# Patient Record
Sex: FEMALE | Race: WHITE | NOT HISPANIC OR LATINO | Employment: OTHER | URBAN - METROPOLITAN AREA
[De-identification: names, ages, dates, MRNs, and addresses within clinical notes are randomized per-mention and may not be internally consistent; named-entity substitution may affect disease eponyms.]

---

## 2022-03-30 ENCOUNTER — APPOINTMENT (OUTPATIENT)
Dept: CT IMAGING | Facility: HOSPITAL | Age: 57
End: 2022-03-30

## 2022-03-30 ENCOUNTER — APPOINTMENT (OUTPATIENT)
Dept: GENERAL RADIOLOGY | Facility: HOSPITAL | Age: 57
End: 2022-03-30

## 2022-03-30 ENCOUNTER — HOSPITAL ENCOUNTER (OUTPATIENT)
Facility: HOSPITAL | Age: 57
Setting detail: OBSERVATION
Discharge: HOME | End: 2022-04-03
Attending: EMERGENCY MEDICINE | Admitting: INTERNAL MEDICINE

## 2022-03-30 ENCOUNTER — APPOINTMENT (OUTPATIENT)
Dept: MRI IMAGING | Facility: HOSPITAL | Age: 57
End: 2022-03-30

## 2022-03-30 DIAGNOSIS — R20.2 PARESTHESIA: ICD-10-CM

## 2022-03-30 DIAGNOSIS — R53.1 GENERALIZED WEAKNESS: ICD-10-CM

## 2022-03-30 DIAGNOSIS — I63.9 CEREBROVASCULAR ACCIDENT (CVA), UNSPECIFIED MECHANISM: Primary | ICD-10-CM

## 2022-03-30 DIAGNOSIS — G08 ACUTE CEREBRAL VENOUS SINUS THROMBOSIS: ICD-10-CM

## 2022-03-30 PROBLEM — I10 HTN (HYPERTENSION): Status: ACTIVE | Noted: 2022-03-30

## 2022-03-30 PROBLEM — R55 SYNCOPE AND COLLAPSE: Status: ACTIVE | Noted: 2022-03-30

## 2022-03-30 PROBLEM — D72.829 LEUKOCYTOSIS: Status: ACTIVE | Noted: 2022-03-30

## 2022-03-30 PROBLEM — Z86.16 PERSONAL HISTORY OF COVID-19: Status: ACTIVE | Noted: 2022-03-30

## 2022-03-30 PROBLEM — F41.8 ANXIETY ASSOCIATED WITH DEPRESSION: Status: ACTIVE | Noted: 2022-03-30

## 2022-03-30 PROBLEM — R09.89 SUSPECTED CEREBROVASCULAR ACCIDENT (CVA): Status: ACTIVE | Noted: 2022-03-30

## 2022-03-30 LAB
ALBUMIN SERPL-MCNC: 4.3 G/DL (ref 3.5–5.2)
ALBUMIN/GLOB SERPL: 1.4 G/DL
ALP SERPL-CCNC: 99 U/L (ref 39–117)
ALT SERPL W P-5'-P-CCNC: 16 U/L (ref 1–33)
AMPHET+METHAMPHET UR QL: NEGATIVE
AMPHETAMINES UR QL: NEGATIVE
ANION GAP SERPL CALCULATED.3IONS-SCNC: 17 MMOL/L (ref 5–15)
AST SERPL-CCNC: 18 U/L (ref 1–32)
BARBITURATES UR QL SCN: NEGATIVE
BASOPHILS # BLD AUTO: 0.04 10*3/MM3 (ref 0–0.2)
BASOPHILS NFR BLD AUTO: 0.3 % (ref 0–1.5)
BENZODIAZ UR QL SCN: NEGATIVE
BILIRUB SERPL-MCNC: 0.2 MG/DL (ref 0–1.2)
BILIRUB UR QL STRIP: NEGATIVE
BUN BLDA-MCNC: 15 MG/DL (ref 8–26)
BUN SERPL-MCNC: 14 MG/DL (ref 6–20)
BUN/CREAT SERPL: 14.9 (ref 7–25)
BUPRENORPHINE SERPL-MCNC: NEGATIVE NG/ML
CA-I BLDA-SCNC: 1.29 MMOL/L (ref 1.2–1.32)
CALCIUM SPEC-SCNC: 9.7 MG/DL (ref 8.6–10.5)
CANNABINOIDS SERPL QL: NEGATIVE
CHLORIDE BLDA-SCNC: 103 MMOL/L (ref 98–109)
CHLORIDE SERPL-SCNC: 103 MMOL/L (ref 98–107)
CLARITY UR: CLEAR
CO2 BLDA-SCNC: 26 MMOL/L (ref 24–29)
CO2 SERPL-SCNC: 22 MMOL/L (ref 22–29)
COCAINE UR QL: NEGATIVE
COLOR UR: YELLOW
CREAT BLDA-MCNC: 1 MG/DL (ref 0.6–1.3)
CREAT SERPL-MCNC: 0.94 MG/DL (ref 0.57–1)
D-LACTATE SERPL-SCNC: 1.1 MMOL/L (ref 0.5–2)
D-LACTATE SERPL-SCNC: 3.4 MMOL/L (ref 0.5–2)
DEPRECATED RDW RBC AUTO: 42.1 FL (ref 37–54)
EGFRCR SERPLBLD CKD-EPI 2021: 65.8 ML/MIN/1.73
EGFRCR SERPLBLD CKD-EPI 2021: 70.9 ML/MIN/1.73
EOSINOPHIL # BLD AUTO: 0.19 10*3/MM3 (ref 0–0.4)
EOSINOPHIL NFR BLD AUTO: 1.7 % (ref 0.3–6.2)
ERYTHROCYTE [DISTWIDTH] IN BLOOD BY AUTOMATED COUNT: 13.5 % (ref 12.3–15.4)
FLUAV RNA RESP QL NAA+PROBE: NOT DETECTED
FLUBV RNA RESP QL NAA+PROBE: NOT DETECTED
GLOBULIN UR ELPH-MCNC: 3 GM/DL
GLUCOSE BLDC GLUCOMTR-MCNC: 123 MG/DL (ref 70–130)
GLUCOSE BLDC GLUCOMTR-MCNC: 129 MG/DL (ref 70–130)
GLUCOSE BLDC GLUCOMTR-MCNC: 161 MG/DL (ref 70–130)
GLUCOSE SERPL-MCNC: 163 MG/DL (ref 65–99)
GLUCOSE UR STRIP-MCNC: NEGATIVE MG/DL
HCT VFR BLD AUTO: 40.5 % (ref 34–46.6)
HCT VFR BLDA CALC: 42 % (ref 38–51)
HGB BLD-MCNC: 13 G/DL (ref 12–15.9)
HGB BLDA-MCNC: 14.3 G/DL (ref 12–17)
HGB UR QL STRIP.AUTO: NEGATIVE
HOLD SPECIMEN: NORMAL
IMM GRANULOCYTES # BLD AUTO: 0.09 10*3/MM3 (ref 0–0.05)
IMM GRANULOCYTES NFR BLD AUTO: 0.8 % (ref 0–0.5)
KETONES UR QL STRIP: NEGATIVE
LEUKOCYTE ESTERASE UR QL STRIP.AUTO: NEGATIVE
LYMPHOCYTES # BLD AUTO: 1.67 10*3/MM3 (ref 0.7–3.1)
LYMPHOCYTES NFR BLD AUTO: 14.6 % (ref 19.6–45.3)
MCH RBC QN AUTO: 27.4 PG (ref 26.6–33)
MCHC RBC AUTO-ENTMCNC: 32.1 G/DL (ref 31.5–35.7)
MCV RBC AUTO: 85.4 FL (ref 79–97)
METHADONE UR QL SCN: NEGATIVE
MONOCYTES # BLD AUTO: 1.01 10*3/MM3 (ref 0.1–0.9)
MONOCYTES NFR BLD AUTO: 8.8 % (ref 5–12)
NEUTROPHILS NFR BLD AUTO: 73.8 % (ref 42.7–76)
NEUTROPHILS NFR BLD AUTO: 8.44 10*3/MM3 (ref 1.7–7)
NITRITE UR QL STRIP: NEGATIVE
NRBC BLD AUTO-RTO: 0 /100 WBC (ref 0–0.2)
OPIATES UR QL: NEGATIVE
OXYCODONE UR QL SCN: NEGATIVE
PCP UR QL SCN: NEGATIVE
PH UR STRIP.AUTO: 7.5 [PH] (ref 5–8)
PLATELET # BLD AUTO: 372 10*3/MM3 (ref 140–450)
PMV BLD AUTO: 9.3 FL (ref 6–12)
POTASSIUM BLDA-SCNC: 3.6 MMOL/L (ref 3.5–4.9)
POTASSIUM SERPL-SCNC: 3.9 MMOL/L (ref 3.5–5.2)
PROCALCITONIN SERPL-MCNC: <0.2 NG/ML (ref 0–0.25)
PROPOXYPH UR QL: NEGATIVE
PROT SERPL-MCNC: 7.3 G/DL (ref 6–8.5)
PROT UR QL STRIP: NEGATIVE
QT INTERVAL: 342 MS
QTC INTERVAL: 445 MS
RBC # BLD AUTO: 4.74 10*6/MM3 (ref 3.77–5.28)
SARS-COV-2 RNA RESP QL NAA+PROBE: NOT DETECTED
SODIUM BLD-SCNC: 141 MMOL/L (ref 138–146)
SODIUM SERPL-SCNC: 142 MMOL/L (ref 136–145)
SP GR UR STRIP: 1.06 (ref 1–1.03)
T4 FREE SERPL-MCNC: 1.22 NG/DL (ref 0.93–1.7)
TRICYCLICS UR QL SCN: NEGATIVE
TROPONIN T SERPL-MCNC: <0.01 NG/ML (ref 0–0.03)
UROBILINOGEN UR QL STRIP: ABNORMAL
WBC NRBC COR # BLD: 11.44 10*3/MM3 (ref 3.4–10.8)
WHOLE BLOOD HOLD SPECIMEN: NORMAL
WHOLE BLOOD HOLD SPECIMEN: NORMAL

## 2022-03-30 PROCEDURE — 80306 DRUG TEST PRSMV INSTRMNT: CPT

## 2022-03-30 PROCEDURE — 85025 COMPLETE CBC W/AUTO DIFF WBC: CPT | Performed by: EMERGENCY MEDICINE

## 2022-03-30 PROCEDURE — 82962 GLUCOSE BLOOD TEST: CPT

## 2022-03-30 PROCEDURE — 85014 HEMATOCRIT: CPT

## 2022-03-30 PROCEDURE — 84439 ASSAY OF FREE THYROXINE: CPT

## 2022-03-30 PROCEDURE — 99223 1ST HOSP IP/OBS HIGH 75: CPT | Performed by: INTERNAL MEDICINE

## 2022-03-30 PROCEDURE — 87636 SARSCOV2 & INF A&B AMP PRB: CPT | Performed by: INTERNAL MEDICINE

## 2022-03-30 PROCEDURE — 70498 CT ANGIOGRAPHY NECK: CPT

## 2022-03-30 PROCEDURE — 70496 CT ANGIOGRAPHY HEAD: CPT

## 2022-03-30 PROCEDURE — 93005 ELECTROCARDIOGRAM TRACING: CPT | Performed by: EMERGENCY MEDICINE

## 2022-03-30 PROCEDURE — 99285 EMERGENCY DEPT VISIT HI MDM: CPT

## 2022-03-30 PROCEDURE — 71045 X-RAY EXAM CHEST 1 VIEW: CPT

## 2022-03-30 PROCEDURE — 81003 URINALYSIS AUTO W/O SCOPE: CPT

## 2022-03-30 PROCEDURE — C9803 HOPD COVID-19 SPEC COLLECT: HCPCS

## 2022-03-30 PROCEDURE — 99214 OFFICE O/P EST MOD 30 MIN: CPT

## 2022-03-30 PROCEDURE — 0 IOPAMIDOL PER 1 ML: Performed by: EMERGENCY MEDICINE

## 2022-03-30 PROCEDURE — 0042T HC CT CEREBRAL PERFUSION W/WO CONTRAST: CPT

## 2022-03-30 PROCEDURE — 70450 CT HEAD/BRAIN W/O DYE: CPT

## 2022-03-30 PROCEDURE — G0378 HOSPITAL OBSERVATION PER HR: HCPCS

## 2022-03-30 PROCEDURE — 84481 FREE ASSAY (FT-3): CPT

## 2022-03-30 PROCEDURE — 70551 MRI BRAIN STEM W/O DYE: CPT

## 2022-03-30 PROCEDURE — 84145 PROCALCITONIN (PCT): CPT | Performed by: NURSE PRACTITIONER

## 2022-03-30 PROCEDURE — 83605 ASSAY OF LACTIC ACID: CPT | Performed by: NURSE PRACTITIONER

## 2022-03-30 PROCEDURE — 80047 BASIC METABLC PNL IONIZED CA: CPT

## 2022-03-30 PROCEDURE — 80053 COMPREHEN METABOLIC PANEL: CPT | Performed by: EMERGENCY MEDICINE

## 2022-03-30 PROCEDURE — 84484 ASSAY OF TROPONIN QUANT: CPT | Performed by: NURSE PRACTITIONER

## 2022-03-30 RX ORDER — ASPIRIN 300 MG/1
300 SUPPOSITORY RECTAL DAILY
Status: DISCONTINUED | OUTPATIENT
Start: 2022-03-31 | End: 2022-03-31

## 2022-03-30 RX ORDER — DULOXETIN HYDROCHLORIDE 30 MG/1
30 CAPSULE, DELAYED RELEASE ORAL EVERY MORNING
Status: DISCONTINUED | OUTPATIENT
Start: 2022-03-31 | End: 2022-04-02

## 2022-03-30 RX ORDER — ASPIRIN 81 MG/1
81 TABLET, CHEWABLE ORAL DAILY
Status: DISCONTINUED | OUTPATIENT
Start: 2022-03-31 | End: 2022-03-31

## 2022-03-30 RX ORDER — ACETAMINOPHEN 325 MG/1
650 TABLET ORAL EVERY 4 HOURS PRN
Status: DISCONTINUED | OUTPATIENT
Start: 2022-03-30 | End: 2022-04-03 | Stop reason: HOSPADM

## 2022-03-30 RX ORDER — CHOLECALCIFEROL (VITAMIN D3) 125 MCG
5 CAPSULE ORAL NIGHTLY PRN
Status: DISCONTINUED | OUTPATIENT
Start: 2022-03-30 | End: 2022-04-03 | Stop reason: HOSPADM

## 2022-03-30 RX ORDER — SODIUM CHLORIDE 9 MG/ML
75 INJECTION, SOLUTION INTRAVENOUS CONTINUOUS
Status: ACTIVE | OUTPATIENT
Start: 2022-03-30 | End: 2022-03-31

## 2022-03-30 RX ORDER — SODIUM CHLORIDE 0.9 % (FLUSH) 0.9 %
10 SYRINGE (ML) INJECTION EVERY 12 HOURS SCHEDULED
Status: DISCONTINUED | OUTPATIENT
Start: 2022-03-30 | End: 2022-03-31 | Stop reason: SDUPTHER

## 2022-03-30 RX ORDER — SODIUM CHLORIDE 0.9 % (FLUSH) 0.9 %
10 SYRINGE (ML) INJECTION AS NEEDED
Status: DISCONTINUED | OUTPATIENT
Start: 2022-03-30 | End: 2022-04-03 | Stop reason: HOSPADM

## 2022-03-30 RX ORDER — ONDANSETRON 4 MG/1
4 TABLET, FILM COATED ORAL EVERY 6 HOURS PRN
Status: DISCONTINUED | OUTPATIENT
Start: 2022-03-30 | End: 2022-04-03 | Stop reason: HOSPADM

## 2022-03-30 RX ORDER — SODIUM CHLORIDE 0.9 % (FLUSH) 0.9 %
10 SYRINGE (ML) INJECTION AS NEEDED
Status: DISCONTINUED | OUTPATIENT
Start: 2022-03-30 | End: 2022-03-31 | Stop reason: SDUPTHER

## 2022-03-30 RX ORDER — SODIUM CHLORIDE 0.9 % (FLUSH) 0.9 %
10 SYRINGE (ML) INJECTION EVERY 12 HOURS SCHEDULED
Status: DISCONTINUED | OUTPATIENT
Start: 2022-03-30 | End: 2022-04-03 | Stop reason: HOSPADM

## 2022-03-30 RX ORDER — ONDANSETRON 2 MG/ML
4 INJECTION INTRAMUSCULAR; INTRAVENOUS EVERY 6 HOURS PRN
Status: DISCONTINUED | OUTPATIENT
Start: 2022-03-30 | End: 2022-04-03 | Stop reason: HOSPADM

## 2022-03-30 RX ORDER — ATORVASTATIN CALCIUM 40 MG/1
80 TABLET, FILM COATED ORAL NIGHTLY
Status: DISCONTINUED | OUTPATIENT
Start: 2022-03-30 | End: 2022-03-31

## 2022-03-30 RX ADMIN — IOPAMIDOL 125 ML: 755 INJECTION, SOLUTION INTRAVENOUS at 17:20

## 2022-03-30 RX ADMIN — SODIUM CHLORIDE 500 ML: 9 INJECTION, SOLUTION INTRAVENOUS at 21:24

## 2022-03-30 RX ADMIN — ACETAMINOPHEN 650 MG: 325 TABLET ORAL at 21:23

## 2022-03-30 RX ADMIN — Medication 10 ML: at 21:24

## 2022-03-30 RX ADMIN — ATORVASTATIN CALCIUM 80 MG: 40 TABLET, FILM COATED ORAL at 21:24

## 2022-03-31 ENCOUNTER — APPOINTMENT (OUTPATIENT)
Dept: NEUROLOGY | Facility: HOSPITAL | Age: 57
End: 2022-03-31

## 2022-03-31 ENCOUNTER — APPOINTMENT (OUTPATIENT)
Dept: MRI IMAGING | Facility: HOSPITAL | Age: 57
End: 2022-03-31

## 2022-03-31 ENCOUNTER — APPOINTMENT (OUTPATIENT)
Dept: CARDIOLOGY | Facility: HOSPITAL | Age: 57
End: 2022-03-31

## 2022-03-31 PROBLEM — R29.898 LEFT ARM WEAKNESS: Status: ACTIVE | Noted: 2022-03-31

## 2022-03-31 LAB
ANION GAP SERPL CALCULATED.3IONS-SCNC: 11 MMOL/L (ref 5–15)
APTT PPP: 29.2 SECONDS (ref 50–95)
BASOPHILS # BLD AUTO: 0.02 10*3/MM3 (ref 0–0.2)
BASOPHILS # BLD AUTO: 0.03 10*3/MM3 (ref 0–0.2)
BASOPHILS NFR BLD AUTO: 0.3 % (ref 0–1.5)
BASOPHILS NFR BLD AUTO: 0.4 % (ref 0–1.5)
BH CV ECHO MEAS - AO MAX PG (FULL): 4.6 MMHG
BH CV ECHO MEAS - AO MAX PG: 12 MMHG
BH CV ECHO MEAS - AO MEAN PG (FULL): 2.4 MMHG
BH CV ECHO MEAS - AO MEAN PG: 6 MMHG
BH CV ECHO MEAS - AO ROOT AREA (BSA CORRECTED): 1.6
BH CV ECHO MEAS - AO ROOT AREA: 7.4 CM^2
BH CV ECHO MEAS - AO ROOT DIAM: 3.1 CM
BH CV ECHO MEAS - AO V2 MAX: 175 CM/SEC
BH CV ECHO MEAS - AO V2 MEAN: 105.1 CM/SEC
BH CV ECHO MEAS - AO V2 VTI: 35.5 CM
BH CV ECHO MEAS - AVA(I,A): 2.9 CM^2
BH CV ECHO MEAS - AVA(I,D): 2.9 CM^2
BH CV ECHO MEAS - AVA(V,A): 2.9 CM^2
BH CV ECHO MEAS - AVA(V,D): 2.9 CM^2
BH CV ECHO MEAS - BSA(HAYCOCK): 2.1 M^2
BH CV ECHO MEAS - BSA: 2 M^2
BH CV ECHO MEAS - BZI_BMI: 36.3 KILOGRAMS/M^2
BH CV ECHO MEAS - BZI_METRIC_HEIGHT: 160 CM
BH CV ECHO MEAS - BZI_METRIC_WEIGHT: 93 KG
BH CV ECHO MEAS - EDV(CUBED): 63.8 ML
BH CV ECHO MEAS - EDV(MOD-SP2): 48.7 ML
BH CV ECHO MEAS - EDV(MOD-SP4): 59.6 ML
BH CV ECHO MEAS - EDV(TEICH): 69.8 ML
BH CV ECHO MEAS - EF(CUBED): 69 %
BH CV ECHO MEAS - EF(MOD-BP): 62 %
BH CV ECHO MEAS - EF(MOD-SP2): 66.1 %
BH CV ECHO MEAS - EF(MOD-SP4): 57.7 %
BH CV ECHO MEAS - EF(TEICH): 61.1 %
BH CV ECHO MEAS - ESV(CUBED): 19.8 ML
BH CV ECHO MEAS - ESV(MOD-SP2): 16.5 ML
BH CV ECHO MEAS - ESV(MOD-SP4): 25.2 ML
BH CV ECHO MEAS - ESV(TEICH): 27.1 ML
BH CV ECHO MEAS - FS: 32.3 %
BH CV ECHO MEAS - IVS/LVPW: 1
BH CV ECHO MEAS - IVSD: 1.1 CM
BH CV ECHO MEAS - LA DIMENSION: 2.3 CM
BH CV ECHO MEAS - LA/AO: 0.74
BH CV ECHO MEAS - LAD MAJOR: 4.6 CM
BH CV ECHO MEAS - LAT PEAK E' VEL: 12.4 CM/SEC
BH CV ECHO MEAS - LATERAL E/E' RATIO: 5.7
BH CV ECHO MEAS - LV DIASTOLIC VOL/BSA (35-75): 30.5 ML/M^2
BH CV ECHO MEAS - LV MASS(C)D: 140.2 GRAMS
BH CV ECHO MEAS - LV MASS(C)DI: 71.7 GRAMS/M^2
BH CV ECHO MEAS - LV MAX PG: 5.4 MMHG
BH CV ECHO MEAS - LV MEAN PG: 2.8 MMHG
BH CV ECHO MEAS - LV SYSTOLIC VOL/BSA (12-30): 12.9 ML/M^2
BH CV ECHO MEAS - LV V1 MAX: 115.7 CM/SEC
BH CV ECHO MEAS - LV V1 MEAN: 77.2 CM/SEC
BH CV ECHO MEAS - LV V1 VTI: 26.3 CM
BH CV ECHO MEAS - LVIDD: 4 CM
BH CV ECHO MEAS - LVIDS: 2.7 CM
BH CV ECHO MEAS - LVLD AP2: 7.5 CM
BH CV ECHO MEAS - LVLD AP4: 7.2 CM
BH CV ECHO MEAS - LVLS AP2: 6.1 CM
BH CV ECHO MEAS - LVLS AP4: 5.7 CM
BH CV ECHO MEAS - LVOT AREA (M): 3.8 CM^2
BH CV ECHO MEAS - LVOT AREA: 3.9 CM^2
BH CV ECHO MEAS - LVOT DIAM: 2.2 CM
BH CV ECHO MEAS - LVPWD: 1.1 CM
BH CV ECHO MEAS - MED PEAK E' VEL: 8.1 CM/SEC
BH CV ECHO MEAS - MEDIAL E/E' RATIO: 8.7
BH CV ECHO MEAS - MV A MAX VEL: 80.1 CM/SEC
BH CV ECHO MEAS - MV DEC SLOPE: 335.7 CM/SEC^2
BH CV ECHO MEAS - MV DEC TIME: 0.27 SEC
BH CV ECHO MEAS - MV E MAX VEL: 70.3 CM/SEC
BH CV ECHO MEAS - MV E/A: 0.88
BH CV ECHO MEAS - MV P1/2T MAX VEL: 100.8 CM/SEC
BH CV ECHO MEAS - MV P1/2T: 87.9 MSEC
BH CV ECHO MEAS - MVA P1/2T LCG: 2.2 CM^2
BH CV ECHO MEAS - MVA(P1/2T): 2.5 CM^2
BH CV ECHO MEAS - PA ACC TIME: 0.16 SEC
BH CV ECHO MEAS - PA MAX PG: 3.5 MMHG
BH CV ECHO MEAS - PA PR(ACCEL): 7.7 MMHG
BH CV ECHO MEAS - PA V2 MAX: 93.8 CM/SEC
BH CV ECHO MEAS - RAP SYSTOLE: 3 MMHG
BH CV ECHO MEAS - RVSP: 26 MMHG
BH CV ECHO MEAS - SI(AO): 134.5 ML/M^2
BH CV ECHO MEAS - SI(CUBED): 22.5 ML/M^2
BH CV ECHO MEAS - SI(LVOT): 53 ML/M^2
BH CV ECHO MEAS - SI(MOD-SP2): 16.5 ML/M^2
BH CV ECHO MEAS - SI(MOD-SP4): 17.6 ML/M^2
BH CV ECHO MEAS - SI(TEICH): 21.8 ML/M^2
BH CV ECHO MEAS - SV(AO): 262.8 ML
BH CV ECHO MEAS - SV(CUBED): 44 ML
BH CV ECHO MEAS - SV(LVOT): 103.6 ML
BH CV ECHO MEAS - SV(MOD-SP2): 32.2 ML
BH CV ECHO MEAS - SV(MOD-SP4): 34.4 ML
BH CV ECHO MEAS - SV(TEICH): 42.7 ML
BH CV ECHO MEAS - TAPSE (>1.6): 2 CM
BH CV ECHO MEAS - TR MAX PG: 23 MMHG
BH CV ECHO MEAS - TR MAX VEL: 237.6 CM/SEC
BH CV ECHO MEASUREMENTS AVERAGE E/E' RATIO: 6.86
BH CV XLRA - RV BASE: 3 CM
BH CV XLRA - RV LENGTH: 6.8 CM
BH CV XLRA - RV MID: 2.3 CM
BH CV XLRA - TDI S': 13.9 CM/SEC
BUN SERPL-MCNC: 7 MG/DL (ref 6–20)
BUN/CREAT SERPL: 10.9 (ref 7–25)
CALCIUM SPEC-SCNC: 9.1 MG/DL (ref 8.6–10.5)
CHLORIDE SERPL-SCNC: 101 MMOL/L (ref 98–107)
CHOLEST SERPL-MCNC: 151 MG/DL (ref 0–200)
CO2 SERPL-SCNC: 24 MMOL/L (ref 22–29)
CREAT SERPL-MCNC: 0.64 MG/DL (ref 0.57–1)
DEPRECATED RDW RBC AUTO: 43.4 FL (ref 37–54)
DEPRECATED RDW RBC AUTO: 43.9 FL (ref 37–54)
EGFRCR SERPLBLD CKD-EPI 2021: 103.2 ML/MIN/1.73
EOSINOPHIL # BLD AUTO: 0.19 10*3/MM3 (ref 0–0.4)
EOSINOPHIL # BLD AUTO: 0.19 10*3/MM3 (ref 0–0.4)
EOSINOPHIL NFR BLD AUTO: 2.3 % (ref 0.3–6.2)
EOSINOPHIL NFR BLD AUTO: 2.4 % (ref 0.3–6.2)
ERYTHROCYTE [DISTWIDTH] IN BLOOD BY AUTOMATED COUNT: 13.8 % (ref 12.3–15.4)
ERYTHROCYTE [DISTWIDTH] IN BLOOD BY AUTOMATED COUNT: 13.8 % (ref 12.3–15.4)
GLUCOSE BLDC GLUCOMTR-MCNC: 112 MG/DL (ref 70–130)
GLUCOSE SERPL-MCNC: 149 MG/DL (ref 65–99)
HBA1C MFR BLD: 5.9 % (ref 4.8–5.6)
HCT VFR BLD AUTO: 36.3 % (ref 34–46.6)
HCT VFR BLD AUTO: 37.1 % (ref 34–46.6)
HDLC SERPL-MCNC: 32 MG/DL (ref 40–60)
HGB BLD-MCNC: 11.7 G/DL (ref 12–15.9)
HGB BLD-MCNC: 11.7 G/DL (ref 12–15.9)
IMM GRANULOCYTES # BLD AUTO: 0.03 10*3/MM3 (ref 0–0.05)
IMM GRANULOCYTES # BLD AUTO: 0.04 10*3/MM3 (ref 0–0.05)
IMM GRANULOCYTES NFR BLD AUTO: 0.4 % (ref 0–0.5)
IMM GRANULOCYTES NFR BLD AUTO: 0.5 % (ref 0–0.5)
INR PPP: 1.04 (ref 0.85–1.16)
LDLC SERPL CALC-MCNC: 99 MG/DL (ref 0–100)
LDLC/HDLC SERPL: 3.03 {RATIO}
LEFT ATRIUM VOLUME INDEX: 18.2 ML/M^2
LEFT ATRIUM VOLUME: 35.5 ML
LYMPHOCYTES # BLD AUTO: 1.87 10*3/MM3 (ref 0.7–3.1)
LYMPHOCYTES # BLD AUTO: 1.9 10*3/MM3 (ref 0.7–3.1)
LYMPHOCYTES NFR BLD AUTO: 23.1 % (ref 19.6–45.3)
LYMPHOCYTES NFR BLD AUTO: 23.9 % (ref 19.6–45.3)
MAGNESIUM SERPL-MCNC: 2.4 MG/DL (ref 1.6–2.6)
MAXIMAL PREDICTED HEART RATE: 163 BPM
MCH RBC QN AUTO: 27.6 PG (ref 26.6–33)
MCH RBC QN AUTO: 27.6 PG (ref 26.6–33)
MCHC RBC AUTO-ENTMCNC: 31.5 G/DL (ref 31.5–35.7)
MCHC RBC AUTO-ENTMCNC: 32.2 G/DL (ref 31.5–35.7)
MCV RBC AUTO: 85.6 FL (ref 79–97)
MCV RBC AUTO: 87.5 FL (ref 79–97)
MONOCYTES # BLD AUTO: 0.69 10*3/MM3 (ref 0.1–0.9)
MONOCYTES # BLD AUTO: 0.72 10*3/MM3 (ref 0.1–0.9)
MONOCYTES NFR BLD AUTO: 8.5 % (ref 5–12)
MONOCYTES NFR BLD AUTO: 9.1 % (ref 5–12)
NEUTROPHILS NFR BLD AUTO: 5.09 10*3/MM3 (ref 1.7–7)
NEUTROPHILS NFR BLD AUTO: 5.27 10*3/MM3 (ref 1.7–7)
NEUTROPHILS NFR BLD AUTO: 63.9 % (ref 42.7–76)
NEUTROPHILS NFR BLD AUTO: 65.2 % (ref 42.7–76)
NRBC BLD AUTO-RTO: 0 /100 WBC (ref 0–0.2)
NRBC BLD AUTO-RTO: 0 /100 WBC (ref 0–0.2)
PLATELET # BLD AUTO: 316 10*3/MM3 (ref 140–450)
PLATELET # BLD AUTO: 348 10*3/MM3 (ref 140–450)
PMV BLD AUTO: 9.2 FL (ref 6–12)
PMV BLD AUTO: 9.7 FL (ref 6–12)
POTASSIUM SERPL-SCNC: 3.2 MMOL/L (ref 3.5–5.2)
POTASSIUM SERPL-SCNC: 4.1 MMOL/L (ref 3.5–5.2)
PROTHROMBIN TIME: 13.3 SECONDS (ref 11.4–14.4)
RBC # BLD AUTO: 4.24 10*6/MM3 (ref 3.77–5.28)
RBC # BLD AUTO: 4.24 10*6/MM3 (ref 3.77–5.28)
SODIUM SERPL-SCNC: 136 MMOL/L (ref 136–145)
STRESS TARGET HR: 139 BPM
T3FREE SERPL-MCNC: 3.46 PG/ML (ref 2–4.4)
TRIGL SERPL-MCNC: 111 MG/DL (ref 0–150)
TSH SERPL DL<=0.05 MIU/L-ACNC: 0.88 UIU/ML (ref 0.27–4.2)
UFH PPP CHRO-ACNC: 0.1 IU/ML (ref 0.3–0.7)
UFH PPP CHRO-ACNC: 0.1 IU/ML (ref 0.3–0.7)
VLDLC SERPL-MCNC: 20 MG/DL (ref 5–40)
WBC NRBC COR # BLD: 7.95 10*3/MM3 (ref 3.4–10.8)
WBC NRBC COR # BLD: 8.09 10*3/MM3 (ref 3.4–10.8)

## 2022-03-31 PROCEDURE — 97161 PT EVAL LOW COMPLEX 20 MIN: CPT

## 2022-03-31 PROCEDURE — G0378 HOSPITAL OBSERVATION PER HR: HCPCS

## 2022-03-31 PROCEDURE — 92523 SPEECH SOUND LANG COMPREHEN: CPT

## 2022-03-31 PROCEDURE — 70544 MR ANGIOGRAPHY HEAD W/O DYE: CPT

## 2022-03-31 PROCEDURE — 95816 EEG AWAKE AND DROWSY: CPT

## 2022-03-31 PROCEDURE — 85730 THROMBOPLASTIN TIME PARTIAL: CPT | Performed by: PSYCHIATRY & NEUROLOGY

## 2022-03-31 PROCEDURE — 96366 THER/PROPH/DIAG IV INF ADDON: CPT

## 2022-03-31 PROCEDURE — 93005 ELECTROCARDIOGRAM TRACING: CPT | Performed by: NURSE PRACTITIONER

## 2022-03-31 PROCEDURE — 85610 PROTHROMBIN TIME: CPT | Performed by: PSYCHIATRY & NEUROLOGY

## 2022-03-31 PROCEDURE — 93306 TTE W/DOPPLER COMPLETE: CPT

## 2022-03-31 PROCEDURE — 80061 LIPID PANEL: CPT

## 2022-03-31 PROCEDURE — 80048 BASIC METABOLIC PNL TOTAL CA: CPT | Performed by: NURSE PRACTITIONER

## 2022-03-31 PROCEDURE — 99233 SBSQ HOSP IP/OBS HIGH 50: CPT | Performed by: INTERNAL MEDICINE

## 2022-03-31 PROCEDURE — 84132 ASSAY OF SERUM POTASSIUM: CPT | Performed by: INTERNAL MEDICINE

## 2022-03-31 PROCEDURE — 96365 THER/PROPH/DIAG IV INF INIT: CPT

## 2022-03-31 PROCEDURE — 93306 TTE W/DOPPLER COMPLETE: CPT | Performed by: INTERNAL MEDICINE

## 2022-03-31 PROCEDURE — 85520 HEPARIN ASSAY: CPT | Performed by: PSYCHIATRY & NEUROLOGY

## 2022-03-31 PROCEDURE — 85025 COMPLETE CBC W/AUTO DIFF WBC: CPT | Performed by: NURSE PRACTITIONER

## 2022-03-31 PROCEDURE — 97535 SELF CARE MNGMENT TRAINING: CPT

## 2022-03-31 PROCEDURE — 25010000002 HEPARIN (PORCINE) 25000-0.45 UT/250ML-% SOLUTION: Performed by: PSYCHIATRY & NEUROLOGY

## 2022-03-31 PROCEDURE — 84443 ASSAY THYROID STIM HORMONE: CPT

## 2022-03-31 PROCEDURE — 85025 COMPLETE CBC W/AUTO DIFF WBC: CPT | Performed by: PSYCHIATRY & NEUROLOGY

## 2022-03-31 PROCEDURE — 82962 GLUCOSE BLOOD TEST: CPT

## 2022-03-31 PROCEDURE — 99214 OFFICE O/P EST MOD 30 MIN: CPT | Performed by: PSYCHIATRY & NEUROLOGY

## 2022-03-31 PROCEDURE — 85520 HEPARIN ASSAY: CPT

## 2022-03-31 PROCEDURE — 97530 THERAPEUTIC ACTIVITIES: CPT

## 2022-03-31 PROCEDURE — 97165 OT EVAL LOW COMPLEX 30 MIN: CPT

## 2022-03-31 PROCEDURE — 83735 ASSAY OF MAGNESIUM: CPT | Performed by: NURSE PRACTITIONER

## 2022-03-31 PROCEDURE — 83036 HEMOGLOBIN GLYCOSYLATED A1C: CPT

## 2022-03-31 RX ORDER — POTASSIUM CHLORIDE 750 MG/1
40 CAPSULE, EXTENDED RELEASE ORAL AS NEEDED
Status: DISCONTINUED | OUTPATIENT
Start: 2022-03-31 | End: 2022-04-03 | Stop reason: HOSPADM

## 2022-03-31 RX ORDER — HYDROCODONE BITARTRATE AND ACETAMINOPHEN 5; 325 MG/1; MG/1
1 TABLET ORAL EVERY 6 HOURS PRN
Status: DISCONTINUED | OUTPATIENT
Start: 2022-03-31 | End: 2022-04-03 | Stop reason: HOSPADM

## 2022-03-31 RX ORDER — GABAPENTIN 300 MG/1
300 CAPSULE ORAL EVERY 8 HOURS SCHEDULED
Status: DISCONTINUED | OUTPATIENT
Start: 2022-03-31 | End: 2022-04-03 | Stop reason: HOSPADM

## 2022-03-31 RX ORDER — POTASSIUM CHLORIDE 7.45 MG/ML
10 INJECTION INTRAVENOUS
Status: DISCONTINUED | OUTPATIENT
Start: 2022-03-31 | End: 2022-04-03 | Stop reason: HOSPADM

## 2022-03-31 RX ORDER — HEPARIN SODIUM 10000 [USP'U]/100ML
18 INJECTION, SOLUTION INTRAVENOUS
Status: DISCONTINUED | OUTPATIENT
Start: 2022-03-31 | End: 2022-04-02

## 2022-03-31 RX ORDER — POTASSIUM CHLORIDE 1.5 G/1.77G
40 POWDER, FOR SOLUTION ORAL AS NEEDED
Status: DISCONTINUED | OUTPATIENT
Start: 2022-03-31 | End: 2022-04-03 | Stop reason: HOSPADM

## 2022-03-31 RX ADMIN — ASPIRIN 81 MG 81 MG: 81 TABLET ORAL at 09:27

## 2022-03-31 RX ADMIN — POTASSIUM CHLORIDE 40 MEQ: 750 CAPSULE, EXTENDED RELEASE ORAL at 15:10

## 2022-03-31 RX ADMIN — Medication 10 ML: at 09:27

## 2022-03-31 RX ADMIN — HYDROCODONE BITARTRATE AND ACETAMINOPHEN 1 TABLET: 5; 325 TABLET ORAL at 15:09

## 2022-03-31 RX ADMIN — DULOXETINE HYDROCHLORIDE 30 MG: 30 CAPSULE, DELAYED RELEASE ORAL at 09:27

## 2022-03-31 RX ADMIN — POTASSIUM CHLORIDE 40 MEQ: 750 CAPSULE, EXTENDED RELEASE ORAL at 11:39

## 2022-03-31 RX ADMIN — GABAPENTIN 300 MG: 300 CAPSULE ORAL at 21:44

## 2022-03-31 RX ADMIN — ACETAMINOPHEN 650 MG: 325 TABLET ORAL at 01:21

## 2022-03-31 RX ADMIN — HEPARIN SODIUM 10.8 UNITS/KG/HR: 10000 INJECTION, SOLUTION INTRAVENOUS at 11:35

## 2022-03-31 RX ADMIN — SODIUM CHLORIDE 75 ML/HR: 9 INJECTION, SOLUTION INTRAVENOUS at 00:54

## 2022-03-31 RX ADMIN — ACETAMINOPHEN 650 MG: 325 TABLET ORAL at 05:19

## 2022-03-31 RX ADMIN — GABAPENTIN 300 MG: 300 CAPSULE ORAL at 15:09

## 2022-04-01 LAB
ANION GAP SERPL CALCULATED.3IONS-SCNC: 10 MMOL/L (ref 5–15)
BASOPHILS # BLD AUTO: 0.05 10*3/MM3 (ref 0–0.2)
BASOPHILS NFR BLD AUTO: 0.7 % (ref 0–1.5)
BUN SERPL-MCNC: 7 MG/DL (ref 6–20)
BUN/CREAT SERPL: 13 (ref 7–25)
CALCIUM SPEC-SCNC: 8.9 MG/DL (ref 8.6–10.5)
CHLORIDE SERPL-SCNC: 104 MMOL/L (ref 98–107)
CO2 SERPL-SCNC: 25 MMOL/L (ref 22–29)
CREAT SERPL-MCNC: 0.54 MG/DL (ref 0.57–1)
DEPRECATED RDW RBC AUTO: 45.7 FL (ref 37–54)
EGFRCR SERPLBLD CKD-EPI 2021: 107.5 ML/MIN/1.73
EOSINOPHIL # BLD AUTO: 0.27 10*3/MM3 (ref 0–0.4)
EOSINOPHIL NFR BLD AUTO: 3.7 % (ref 0.3–6.2)
ERYTHROCYTE [DISTWIDTH] IN BLOOD BY AUTOMATED COUNT: 13.7 % (ref 12.3–15.4)
GLUCOSE SERPL-MCNC: 128 MG/DL (ref 65–99)
HCT VFR BLD AUTO: 39.2 % (ref 34–46.6)
HGB BLD-MCNC: 11.9 G/DL (ref 12–15.9)
IMM GRANULOCYTES # BLD AUTO: 0.04 10*3/MM3 (ref 0–0.05)
IMM GRANULOCYTES NFR BLD AUTO: 0.5 % (ref 0–0.5)
LYMPHOCYTES # BLD AUTO: 2.34 10*3/MM3 (ref 0.7–3.1)
LYMPHOCYTES NFR BLD AUTO: 31.8 % (ref 19.6–45.3)
MCH RBC QN AUTO: 27.7 PG (ref 26.6–33)
MCHC RBC AUTO-ENTMCNC: 30.4 G/DL (ref 31.5–35.7)
MCV RBC AUTO: 91.4 FL (ref 79–97)
MONOCYTES # BLD AUTO: 0.81 10*3/MM3 (ref 0.1–0.9)
MONOCYTES NFR BLD AUTO: 11 % (ref 5–12)
NEUTROPHILS NFR BLD AUTO: 3.84 10*3/MM3 (ref 1.7–7)
NEUTROPHILS NFR BLD AUTO: 52.3 % (ref 42.7–76)
NRBC BLD AUTO-RTO: 0 /100 WBC (ref 0–0.2)
PLATELET # BLD AUTO: 297 10*3/MM3 (ref 140–450)
PMV BLD AUTO: 9.4 FL (ref 6–12)
POTASSIUM SERPL-SCNC: 4.1 MMOL/L (ref 3.5–5.2)
RBC # BLD AUTO: 4.29 10*6/MM3 (ref 3.77–5.28)
SODIUM SERPL-SCNC: 139 MMOL/L (ref 136–145)
UFH PPP CHRO-ACNC: 0.1 IU/ML (ref 0.3–0.7)
UFH PPP CHRO-ACNC: 0.1 IU/ML (ref 0.3–0.7)
UFH PPP CHRO-ACNC: >1.1 IU/ML (ref 0.3–0.7)
WBC NRBC COR # BLD: 7.35 10*3/MM3 (ref 3.4–10.8)

## 2022-04-01 PROCEDURE — 85520 HEPARIN ASSAY: CPT

## 2022-04-01 PROCEDURE — 85520 HEPARIN ASSAY: CPT | Performed by: INTERNAL MEDICINE

## 2022-04-01 PROCEDURE — 25010000002 HEPARIN (PORCINE) 25000-0.45 UT/250ML-% SOLUTION

## 2022-04-01 PROCEDURE — 80048 BASIC METABOLIC PNL TOTAL CA: CPT | Performed by: INTERNAL MEDICINE

## 2022-04-01 PROCEDURE — 25010000002 HEPARIN (PORCINE) 25000-0.45 UT/250ML-% SOLUTION: Performed by: INTERNAL MEDICINE

## 2022-04-01 PROCEDURE — 99232 SBSQ HOSP IP/OBS MODERATE 35: CPT | Performed by: INTERNAL MEDICINE

## 2022-04-01 PROCEDURE — 96366 THER/PROPH/DIAG IV INF ADDON: CPT

## 2022-04-01 PROCEDURE — G0378 HOSPITAL OBSERVATION PER HR: HCPCS

## 2022-04-01 PROCEDURE — 85025 COMPLETE CBC W/AUTO DIFF WBC: CPT | Performed by: INTERNAL MEDICINE

## 2022-04-01 PROCEDURE — 99213 OFFICE O/P EST LOW 20 MIN: CPT | Performed by: CLINICAL NURSE SPECIALIST

## 2022-04-01 RX ADMIN — HEPARIN SODIUM 19 UNITS/KG/HR: 10000 INJECTION, SOLUTION INTRAVENOUS at 07:05

## 2022-04-01 RX ADMIN — HEPARIN SODIUM 18 UNITS/KG/HR: 10000 INJECTION, SOLUTION INTRAVENOUS at 22:43

## 2022-04-01 RX ADMIN — ACETAMINOPHEN 650 MG: 325 TABLET ORAL at 22:44

## 2022-04-01 RX ADMIN — GABAPENTIN 300 MG: 300 CAPSULE ORAL at 19:05

## 2022-04-01 RX ADMIN — HYDROCODONE BITARTRATE AND ACETAMINOPHEN 1 TABLET: 5; 325 TABLET ORAL at 06:42

## 2022-04-01 RX ADMIN — GABAPENTIN 300 MG: 300 CAPSULE ORAL at 05:44

## 2022-04-01 RX ADMIN — DULOXETINE HYDROCHLORIDE 30 MG: 30 CAPSULE, DELAYED RELEASE ORAL at 06:02

## 2022-04-01 RX ADMIN — Medication 10 ML: at 08:53

## 2022-04-01 RX ADMIN — HYDROCODONE BITARTRATE AND ACETAMINOPHEN 1 TABLET: 5; 325 TABLET ORAL at 00:17

## 2022-04-01 RX ADMIN — Medication 5 MG: at 00:17

## 2022-04-01 NOTE — PROGRESS NOTES
"Neurology       Patient Care Team:  Betty Figueroa APRN as PCP - General (Family Medicine)    Chief complaint headache, right side heaviness       Subjective .     History:    Sitting in bed watching TV.   at bedside.  Patient continues to have fluctuating headache symptoms.  At this time it is dull rated 1 out of 10.  She is taking Lortab as needed, and is on gabapentin 3 times daily.  Continues on heparin drip at this time, not therapeutic yet.  She denies any weakness, she does say she had some dizziness when up to the restroom.  Denies nausea or vomiting, continues with some blurry vision.    ROS: +headache, blurry vision   All other systems negative    Objective     Vital Signs   Blood pressure 144/76, pulse 67, temperature 98 °F (36.7 °C), temperature source Oral, resp. rate 16, height 160 cm (63\"), weight 93 kg (205 lb), SpO2 93 %.    Physical Exam:  Adult woman, awake and alert, oriented to person/place/time. Speech is clear, no dysarthria or aphasia. EOMI, VF intact to threat, no facial weakness, hearing intact, tongue midline. Motor nml tone and bulk, no drift, upper 5/5, lower 5/5. Sensation intact to light touch.    Results Review:               MRI brain personally reviewed no acute stroke  MRV brain personally reviewed showing superior sagittal and right transverse occlusion/consistent w/ cerebral venous thrombosis    2d Echo  · Left ventricular ejection fraction appears to be 61 - 65%. Left ventricular systolic function is normal.  · Left ventricular wall thickness is consistent with borderline concentric hypertrophy  · Saline test results negative     a1c 5.9, ldl 99  ua negative for infection  uds negative  Heparin anti-Xa level this a.m. less than 0.10  Hemoglobin 11.9, hematocrit 39.2, platelets 297      EEG    Result Date: 3/31/2022  Normal study This report is transcribed using the Dragon dictation system.      CT Angiogram Neck    Result Date: 3/30/2022   1. No evidence of " hemodynamically significant carotid or right vertebral artery stenosis in the neck. 2. Hypoplastic appearing left vertebral artery, which is inconsistent in diameter along its length, possibly as a developmental variant. No evidence of left vertebral occlusion. 3. Incidentally noted enlarged adenoids.  This report was finalized on 3/30/2022 6:03 PM by Dr. Taye Mayfield MD.      MRI Brain Without Contrast    Result Date: 3/31/2022   1. No acute diffusion restriction to suggest ischemia 2. Abnormal appearance of the sagittal sinus with heterogeneous areas of T1 signal. Findings may be artifactual in nature however developing venous sinus thrombosis is a consideration. Further evaluation with CT venogram or MR venogram recommended. 3. Right mastoid effusion, possible mastoiditis  Findings were called to the patient's nurse at the time of interpretation..  This report was finalized on 3/31/2022 12:40 AM by Rudi Bartholomew.      XR Chest 1 View    Result Date: 3/30/2022  IMPRESSION : Negative low lung volume portable chest[  This report was finalized on 3/30/2022 6:15 PM by Rudi Bartholomew.      MRI Angiogram Venogram Head    Result Date: 3/31/2022  Abnormal signal within superior sagittal, right transverse, and right sigmoid sinuses, suggesting venous sinus thrombosis.  This report was finalized on 3/31/2022 10:47 AM by Rashaun Escoto MD.      CT Head Without Contrast Stroke Protocol    Result Date: 3/30/2022  No evidence of acute intracranial disease.    Note: Exam time is shown as 4:58 PM. Study was reviewed on the CT scan monitor and discussed with Dr. Cochran at approximately 5:03 PM.  This report was finalized on 3/30/2022 5:13 PM by Dr. Taye Mayfield MD.      CT Angiogram Head w AI Analysis of LVO    Result Date: 3/30/2022   1. Segmental narrowing of the distal 2 cm of the left vertebral artery, perhaps as a developmental variant. No definite underlying focal stenosis is seen. Although vertebral artery dissection  can cause segmental narrowing, No particular features are seen to suggest this. 2. No evidence of hemodynamically significant intracranial vascular stenosis is seen elsewhere.   This report was finalized on 3/30/2022 6:12 PM by Dr. Taye Mayfield MD.      CT CEREBRAL PERFUSION WITH & WITHOUT CONTRAST    Result Date: 3/30/2022   1. Negative rapid analysis for large vessel occlusion or ischemia. 2. Mild asymmetry of blood flow in the right posterior temporal and occipital lobe, of only questionable significance, possibly relatively slow blood flow in this area. No corresponding abnormality to suggest ischemia or infarction.  This report was finalized on 3/30/2022 5:28 PM by Dr. Taye Mayfield MD.          Assessment/Plan     Cerebral venous thrombosis in setting of recent COVID infection complicated by headache and syncope due to increased ICP. No ischemic changes on MRI brain. EEG nml. Echo reassuring.      -heparin gtt, no bolus ever  -once heparin is therapeutic, transition to eliquis 5mg bid for minimum of three months   -gabapentin 300mg tid  -prn lortab  -avoid OTC meds for headache  -Okay to restart patient's home meds including blood pressure and antihistamine if needed  -Patient will need repeat MRV in about 3 months, and follow-up in clinic    Discussed plan with patient and  at bedside, all questions answered.  Stroke neurology will continue to follow patient.         BERE Melvin  04/01/22  09:12 EDT

## 2022-04-01 NOTE — PLAN OF CARE
Goal Outcome Evaluation:  Plan of Care Reviewed With: patient, significant other        Progress: improving  Outcome Evaluation: VSS. NSR. NIHSS=0. No dizziness/syncope exhibited today. Heparin IV infusing as ordered. Pt's headache relieved w/ norco. Plan home w/ family when medically ready.     K+ repleted, repeat lab result pending.

## 2022-04-01 NOTE — PROGRESS NOTES
HEPARIN INFUSION  Naima Paul is a  57 y.o. female receiving heparin infusion.           Therapy for (VTE/Cardiac): Cardiac (venous sinus thrombosis)  Patient Weight: 92.9 kg   Initial Bolus (Y/N):  N  Any Bolus (Y/N):  N       Signs or Symptoms of Bleeding: None per RN ; monitor for infiltration    Cardiac or Other (Not VTE)   Initial Bolus: 60 units/kg (Max 4,000 units)  Initial rate: 12 units/kg/hr (Max 1,000 units/hr)   Anti-Xa (IU/mL) Bolus Dose Stop Infusion Rate Change Repeat Anti-Xa      ?0.19 60 units/kg 0 hrs Increase rate by 4 units/kg/hr 6 hrs       0.2 - 0.29  30 units/kg 0 hrs Increase rate by 2 units/kg/hr 6 hrs    0.3 - 0.7 0 0 hrs No change 6 hrs      0.71 - 0.99 0  0 hrs Decrease rate by 2 units/kg/hr 6 hrs            ?1 0 Hold 1 hr Decrease rate by 3 units/kg/hr 6 hrs      Results from last 7 days   Lab Units 04/01/22  0827 03/31/22  1155 03/31/22  0801 03/30/22  1711   INR   --  1.04  --   --    HEMOGLOBIN g/dL 11.9*  --  11.7*  11.7* 13.0   HEMATOCRIT % 39.2  --  36.3  37.1 40.5   PLATELETS 10*3/mm3 297  --  348  316 372       Date   Time   Anti-Xa Current Rate (Unit/kg/hr) Bolus   (Units) Rate Change   (Unit/kg/hr) New Rate (Unit/kg/hr) Next   Anti-Xa Comments  Pump Check Daily   3/31 1046 0.10 -- -- +10.8 10.8 1800 D/w OLLIE Hernández; will start when able; follow up Anti-Xa initial when back   3/31 1400 -- -- -- -- -- -- PUMP CHECKED    3.31 1816 0.10 10.8 -- +4.2 15 0000 S/w nurse   4/1 0038 0.10 15 -- +4 19 0800 D/W  Brian   4/1 0827 0.10 19 -- +3 22 1800 D/w OLLIE Langford; pt line infiltrated ~0830 this AM (close to when lab was drawn - will increase by 3 only as AXA may be falsely low)                                                                                                                                                                                     Taylor Riedel, PharmD, [facility]  Pharmacy Resident  4/1/2022  11:18 EDT

## 2022-04-01 NOTE — CASE MANAGEMENT/SOCIAL WORK
Continued Stay Note  [facility]     Patient Name: Naima Paul  MRN: [id number]  Today's Date: 4/1/2022    Admit Date: 3/30/2022     Discharge Plan     Row Name 04/01/22 1344       Plan    Plan Home    Plan Comments Spoke with patient in room.  Her plan is still to return home at discharge.  Discussed patient in MDR.  Plan to initiate Eliquis.  Pharmacy consulted for Meds to Beds.  Patient also given copay card.  No other needs noted at this time.  CM will continue to follow.    Final Discharge Disposition Code 01 - home or self-care               Discharge Codes    No documentation.               Expected Discharge Date and Time     Expected Discharge Date Expected Discharge Time    Apr 2, 2022             Juany York RN

## 2022-04-01 NOTE — PROGRESS NOTES
[facility]  PROGRESS NOTE    Patient Name: Naima Paul  : 1965  MRN: [id number]    Date of Admission: 3/30/2022  Primary Care Physician: Betty Figueroa APRN    Subjective   Subjective     CC:  R arm paresthesias, headaches, syncope    HPI: headache is better.  Gets worse at night, but meds seem to be helping.  No further paresthesias.  Feels generally a bit weak, and still has 'gray area' in center of her vision.     ROS:  Gen- No fevers, chills  CV- No chest pain, palpitations  Resp- No cough, dyspnea  GI- No N/V/D, abd pain        Objective   Objective     Vital Signs:   Temp:  [98.1 °F (36.7 °C)-98.2 °F (36.8 °C)] 98.2 °F (36.8 °C)  Heart Rate:  [64-85] 69  Resp:  [16-18] 18  BP: (112-136)/(68-88) 136/81  Flow (L/min):  [2] 2     Physical Exam:  Constitutional: Awake, alert , walks back from BR with minimal assistance.  NAD   Eyes: PER, sclerae anicteric, no conjunctival injection  HENT: NCAT, mucous membranes moist  Neck: Supple, trachea midline  Respiratory: Clear to auscultation bilaterally, nonlabored respirations   Cardiovascular: RRR, no murmurs, rubs  Gastrointestinal: Positive bowel sounds, soft, nontender, nondistended  Musculoskeletal: No bilateral ankle edema, no clubbing or cyanosis to extremities  Psychiatric: Appropriate affect, cooperative  Neurologic: Oriented x 3, strength symmetric in all extremities, Cranial Nerves grossly intact to confrontation, speech clear, FTN intact bilaterally, HTS intact bilaterally, EOMI,   Skin: No rashes      Results Reviewed:  LAB RESULTS:      Lab 22  0038 22  1816 22  1155 22  0801 22  2058 22  1711 22  1706   WBC  --   --   --  7.95  8.09  --  11.44*  --    HEMOGLOBIN  --   --   --  11.7*  11.7*  --  13.0  --    HEMOGLOBIN, POC  --   --   --   --   --   --  14.3   HEMATOCRIT  --   --   --  36.3  37.1  --  40.5  --    HEMATOCRIT POC  --   --   --   --   --   --   42   PLATELETS  --   --   --  348  316  --  372  --    NEUTROS ABS  --   --   --  5.09  5.27  --  8.44*  --    IMMATURE GRANS (ABS)  --   --   --  0.03  0.04  --  0.09*  --    LYMPHS ABS  --   --   --  1.90  1.87  --  1.67  --    MONOS ABS  --   --   --  0.72  0.69  --  1.01*  --    EOS ABS  --   --   --  0.19  0.19  --  0.19  --    MCV  --   --   --  85.6  87.5  --  85.4  --    PROCALCITONIN  --   --   --   --   --  <0.20  --    LACTATE  --   --   --   --  1.1 3.4*  --    PROTIME  --   --  13.3  --   --   --   --    APTT  --   --  29.2*  --   --   --   --    HEPARIN ANTI-XA 0.10* 0.10* 0.10*  --   --   --   --          Lab 03/31/22 2044 03/31/22  0801 03/30/22  1711 03/30/22  1706   SODIUM  --  136 142  --    POTASSIUM 4.1 3.2* 3.9  --    CHLORIDE  --  101 103  --    CO2  --  24.0 22.0  --    ANION GAP  --  11.0 17.0*  --    BUN  --  7 14  --    CREATININE  --  0.64 0.94 1.00   EGFR  --  103.2 70.9 65.8   GLUCOSE  --  149* 163*  --    CALCIUM  --  9.1 9.7  --    MAGNESIUM  --  2.4  --   --    HEMOGLOBIN A1C  --  5.90*  --   --    TSH  --  0.879  --   --          Lab 03/30/22  1711   TOTAL PROTEIN 7.3   ALBUMIN 4.30   GLOBULIN 3.0   ALT (SGPT) 16   AST (SGOT) 18   BILIRUBIN 0.2   ALK PHOS 99         Lab 03/31/22  1155 03/30/22  2058   TROPONIN T  --  <0.010   PROTIME 13.3  --    INR 1.04  --          Lab 03/31/22  0801   CHOLESTEROL 151   LDL CHOL 99   HDL CHOL 32*   TRIGLYCERIDES 111             Brief Urine Lab Results  (Last result in the past 365 days)      Color   Clarity   Blood   Leuk Est   Nitrite   Protein   CREAT   Urine HCG        03/30/22 2055 Yellow   Clear   Negative   Negative   Negative   Negative                 Microbiology Results Abnormal     Procedure Component Value - Date/Time    COVID-19 and FLU A/B PCR - Swab, Nasopharynx [[id number]]  (Normal) Collected: 03/30/22 1941    Lab Status: Final result Specimen: Swab from Nasopharynx Updated: 03/30/22 2022     COVID19 Not Detected      Influenza A PCR Not Detected     Influenza B PCR Not Detected    Narrative:      Fact sheet for providers: https://www.fda.gov/media/020152/download    Fact sheet for patients: https://www.fda.gov/media/162345/download    Test performed by PCR.          Adult Transthoracic Echo Complete W/ Cont if Necessary Per Protocol (With Agitated Saline)    Result Date: 3/31/2022  · Left ventricular ejection fraction appears to be 61 - 65%. Left ventricular systolic function is normal. · Left ventricular wall thickness is consistent with borderline concentric hypertrophy.      EEG    Result Date: 3/31/2022  Reason for referral: 57 y.o.female with syncope, speech changes, consideration of seizure Technical Summary:  A 19 channel digital EEG was performed using the international 10-20 placement system, including eye leads and EKG leads. Duration: 20 minutes Findings: The awake tracing shows a low amplitude well-regulated 10 Hz posterior rhythm present symmetrically over the occipital and posterior parietal leads.  Low to medium amplitude intermixed theta and alpha activity is seen anteriorly along with intermixed EMG and eye blink artifact.  Photic stimulation elicits a symmetric driving response.  Hyperventilation is not performed.  Light drowsiness is seen with mild slowing of the background but stage II sleep is not seen.  No focal features or epileptiform activity are seen. Video: Off Technical quality: Good EKG: Regular, 70 bpm SUMMARY: Normal EEG in the awake and lightly drowsy states No focal features or epileptiform activity are seen     Impression: Normal study This report is transcribed using the Dragon dictation system.      CT Angiogram Neck    Result Date: 3/30/2022  DATE OF EXAM: 3/30/2022 5:01 PM  PROCEDURE: CT ANGIOGRAM NECK-  INDICATIONS: Stroke, follow up  COMPARISON: No comparisons available.  TECHNIQUE: CTA of the head and CTA of the neck was performed after the intravenous administration of 125 mL of Isovue  370. Reconstructed coronal and sagittal images were also obtained. In addition, a 3-D volume rendered image was obtained after post processing. Automated exposure control and iterative reconstruction methods were used. AI analysis of LVO was utilized for the CTA Head imaging portion of the study.  The radiation dose reduction device was turned on for each scan per the ALARA (As Low as Reasonably Achievable) protocol.  Stenosis measurement was performed by the NASCET or similar method.  FINDINGS:  Images of the proximal carotid and vertebral arteries are degraded as a result of body habitus and x-ray beam scatter. No significant common carotid stenosis is suspected.  On the right, common carotid, internal and external carotid arteries appear unremarkable to the level of the skull base.  On the left, common, internal and external carotid arteries appear within normal limits as well.  Right vertebral artery is a relatively robust vessel, normal in appearance along its length to the level of the skull base.  Left vertebral artery is a small vessel of inconsistent diameter, change is diameter along its course, and appearing quite small within the posterior fossa. No particular features are seen to suggest clot or to favor a dissection.  Elsewhere at the level of this scan, no significant soft tissue neck pathology is seen Other than perhaps mildly enlarged adenoids. Included lung apices appear clear.        Impression:  1. No evidence of hemodynamically significant carotid or right vertebral artery stenosis in the neck. 2. Hypoplastic appearing left vertebral artery, which is inconsistent in diameter along its length, possibly as a developmental variant. No evidence of left vertebral occlusion. 3. Incidentally noted enlarged adenoids.  This report was finalized on 3/30/2022 6:03 PM by Dr. Taye Mayfield MD.      MRI Brain Without Contrast    Result Date: 3/31/2022  MRI BRAIN WO CONTRAST-  Date of Exam: 3/30/2022 9:55 PM   Indication: Stroke, follow up; I63.9-Cerebral infarction, unspecified; R20.2-Paresthesia of skin; R53.1-Weakness.  Technique: Routine multiplanar multisequence MR imaging of the brain was performed without the administration of   gadolinium contrast.  Comparison Exams: CTs performed same day  FINDINGS: There are no areas of restricted diffusion.    There is no evidence of intracranial hemorrhage.  There is no mass, mass effect, or hydrocephalus.   No abnormal extra-axial fluid collections are seen.    Increased heterogeneous T1 signal seen within the sagittal sinus without corresponding findings in the straight sinus or additional major sinuses..   Sella and suprasellar cistern are within normal limits.    Cranio-vertebral junction is normal.  Globes and orbits are within normal limits.  Near complete opacification of the right mastoid air cells is noted. Left mastoid air cells and visualized paranasal sinuses are clear      Impression:  1. No acute diffusion restriction to suggest ischemia 2. Abnormal appearance of the sagittal sinus with heterogeneous areas of T1 signal. Findings may be artifactual in nature however developing venous sinus thrombosis is a consideration. Further evaluation with CT venogram or MR venogram recommended. 3. Right mastoid effusion, possible mastoiditis  Findings were called to the patient's nurse at the time of interpretation..  This report was finalized on 3/31/2022 12:40 AM by Rudi Bartholomew.      XR Chest 1 View    Result Date: 3/30/2022   DATE OF EXAM: 3/30/2022 4:58 PM  PROCEDURE: XR CHEST 1 VW-  INDICATIONS: Stroke Protocol (Onset > 12 Hrs)  COMPARISON: No Comparisons Available  TECHNIQUE: Portable Chest  FINDINGS:  Heart size is within normal limits.  Lung volumes are low with vascular crowding and atelectasis. No focal consolidation noted. Osseous structures are unremarkable      Impression: IMPRESSION : Negative low lung volume portable chest[  This report was finalized on  3/30/2022 6:15 PM by Rudi Bartholomew.      MRI Angiogram Venogram Head    Result Date: 3/31/2022  DATE OF EXAM: 3/31/2022 2:00 AM  PROCEDURE: MRI ANGIOGRAM VENOGRAM HEAD-  INDICATIONS: Stroke, follow up; I63.9-Cerebral infarction, unspecified; R20.2-Paresthesia of skin; R53.1-Weakness  COMPARISON: MRI brain and CT angiography head from March 30, 2022  TECHNIQUE: MRI venogram of the head was performed without IV contrast using time-of-flight technique.  FINDINGS: There is abnormal signal within the superior sagittal sinus and the right transverse and sigmoid sinuses suggesting venous sinus thrombosis. The straight sinus and left transverse and sigmoid sinuses appear widely patent.      Impression: Abnormal signal within superior sagittal, right transverse, and right sigmoid sinuses, suggesting venous sinus thrombosis.  This report was finalized on 3/31/2022 10:47 AM by Rashaun Escoto MD.      CT Head Without Contrast Stroke Protocol    Result Date: 3/30/2022  DATE OF EXAM: 3/30/2022 4:54 PM  PROCEDURE: CT HEAD WO CONTRAST STROKE PROTOCOL-  INDICATIONS: Stroke, follow up  COMPARISON: No comparisons available.  TECHNIQUE: Routine transaxial and coronal reconstruction images were obtained through the head without the administration of contrast. Automated exposure control and iterative reconstruction methods were used.  The radiation dose reduction device was turned on for each scan per the ALARA (As Low as Reasonably Achievable) protocol.  FINDINGS: The calvarium appears intact. Included paranasal sinuses mastoids and middle ear spaces appear clear. Soft tissue window images show mild degree of generalized cerebral atrophy appropriate for age. There is no evidence of acute or old infarction, no evidence of intracranial hemorrhage, mass, mass effect, hydrocephalus, or abnormal extra-axial collection. Gray/white matter interface is well maintained.      Impression: No evidence of acute intracranial disease.    Note:  Exam time is shown as 4:58 PM. Study was reviewed on the CT scan monitor and discussed with Dr. Cochran at approximately 5:03 PM.  This report was finalized on 3/30/2022 5:13 PM by Dr. Taye Mayfield MD.      CT Angiogram Head w AI Analysis of LVO    Result Date: 3/30/2022  EXAMINATION: CT ANGIOGRAM HEAD W AI ANALYSIS OF LVO-  INDICATION: Stroke, follow up  TECHNIQUE: Pre and postcontrast 3 mm and 0.75 mm axial images through the brain with sagittal and coronal 2-D reconstructions and 3-D VRT and MIP angiographic reconstructions.  The radiation dose reduction device was turned on for each scan per the ALARA (As Low as Reasonably Achievable) protocol.  AI analysis of LVO was utilized.  COMPARISON: NONE  FINDINGS: Intracranial portions of the internal carotid arteries appear normal in caliber. Anterior and middle cerebral arteries and proximal branches appear grossly normal with no apparent focal stenosis. Right vertebral artery appears significantly larger than left and normal in appearance. Left vertebral artery shows a markedly narrowed distal 2 cm segment compared to the proximal portion of vertebral artery, whether as a result of underlying occult stenosis, or as a developmental variant. Basilar artery and posterior cerebral arteries appear grossly normal, although the posterior cerebral arteries are not well seen out into the periphery. No evidence of vascular malformation is appreciated.       Impression:  1. Segmental narrowing of the distal 2 cm of the left vertebral artery, perhaps as a developmental variant. No definite underlying focal stenosis is seen. Although vertebral artery dissection can cause segmental narrowing, No particular features are seen to suggest this. 2. No evidence of hemodynamically significant intracranial vascular stenosis is seen elsewhere.   This report was finalized on 3/30/2022 6:12 PM by Dr. Taye Mayfield MD.      CT CEREBRAL PERFUSION WITH & WITHOUT CONTRAST    Result Date:  3/30/2022  DATE OF EXAM: 3/30/2022 5:01 PM  PROCEDURE: CT CEREBRAL PERFUSION W WO CONTRAST-  INDICATIONS: Neuro deficit, acute, stroke suspected  COMPARISON: No comparisons available.  TECHNIQUE: Routine transaxial cuts were obtained through the head without administration of contrast. Routine transaxial cuts were then obtained through the head following the intravenous administration of 125 mL of Isovue 300. Core blood volume, core blood flow, mean transit time, and Tmax images were obtained utilizing the Rapid software protocol. A limited CT angiogram of the head was also performed to measure the blood vessel density.  The radiation dose reduction device was turned on for each scan per the ALARA (As Low as Reasonably Achievable) protocol.  FINDINGS: Rapid analysis shows no areas of the brain demonstrating cerebral blood flow less than 30% or T-Max greater than six seconds. Individual perfusion maps, appear to show mild asymmetry of blood flow with decreased time to drain and mean transit time generally in right posterior cerebral artery distribution. Subjectively, there may be slightly decreased cerebral blood flow here as well but this is a more questionable finding and cerebral blood volume appears normal. No focal asymmetry or perfusion is seen elsewhere to suggest focal ischemia.      Impression:  1. Negative rapid analysis for large vessel occlusion or ischemia. 2. Mild asymmetry of blood flow in the right posterior temporal and occipital lobe, of only questionable significance, possibly relatively slow blood flow in this area. No corresponding abnormality to suggest ischemia or infarction.  This report was finalized on 3/30/2022 5:28 PM by Dr. Taye Mayfield MD.        Results for orders placed during the hospital encounter of 03/30/22    Adult Transthoracic Echo Complete W/ Cont if Necessary Per Protocol (With Agitated Saline)    Interpretation Summary  · Left ventricular ejection fraction appears to be 61 -  65%. Left ventricular systolic function is normal.  · Left ventricular wall thickness is consistent with borderline concentric hypertrophy.      I have reviewed the medications:  Scheduled Meds:DULoxetine, 30 mg, Oral, QAM  gabapentin, 300 mg, Oral, Q8H  sodium chloride, 10 mL, Intravenous, Q12H      Continuous Infusions:heparin, 19 Units/kg/hr, Last Rate: 19 Units/kg/hr (04/01/22 0705)  Pharmacy to Dose Heparin,       PRN Meds:.•  acetaminophen  •  HYDROcodone-acetaminophen  •  melatonin  •  ondansetron **OR** ondansetron  •  Pharmacy to Dose Heparin  •  potassium chloride **OR** potassium chloride **OR** potassium chloride  •  sodium chloride    Assessment/Plan   Assessment & Plan     Active Hospital Problems    Diagnosis  POA   • Left arm weakness [R29.898]  Yes   • Suspected cerebrovascular accident (CVA) [R09.89]  Yes   • HTN (hypertension) [I10]  Yes   • Syncope and collapse [R55]  Yes   • Anxiety associated with depression [F41.8]  Yes   • Leukocytosis [D72.829]  Yes   • Personal history of COVID-19 [Z86.16]  Yes      Resolved Hospital Problems   No resolved problems to display.        Brief Hospital Course to date:  Naima Paul is a 57 y.o. female  w/ a hx of HTN, anxiety, depression who presented to the ED w/ c/o weakness, paresthesias, transient vision loss and syncope.  She was diagnosed with COVID19 a month ago.     Venous sinus thrombosis  Intracranial hypertension, symptomatic (headache, syncope, R arm paresthesias)   - in setting of recent COVID19  - neurologist appreciated   - heparin gtt started,   - observe inpatient, then plan transition to DOAC      Leukocytosis, resolved  - CXR nad UA unremarkable      HTN -hold routine HCTZ and Toprol since BP is nl for now     DVT prophylaxis:  Mechanical  CODE STATUS:  full  Code Status (Patient has no pulse and is not breathing): CPR (Attempt to Resuscitate)  Medical Interventions (Patient has pulse or is breathing): Full Support     DVT prophylaxis:  Medical  and mechanical DVT prophylaxis orders are present.       AM-PAC 6 Clicks Score (PT): 24 (04/01/22 9337)    Disposition: I expect the patient to be discharged when OK with neurology.     CODE STATUS:   Code Status and Medical Interventions:   Ordered at: 03/30/22 1950     Code Status (Patient has no pulse and is not breathing):    CPR (Attempt to Resuscitate)     Medical Interventions (Patient has pulse or is breathing):    Full Support       Aubrie Centeno MD  04/01/22

## 2022-04-02 LAB
QT INTERVAL: 440 MS
QTC INTERVAL: 450 MS
UFH PPP CHRO-ACNC: 0.76 IU/ML (ref 0.3–0.7)
UFH PPP CHRO-ACNC: >1.1 IU/ML (ref 0.3–0.7)

## 2022-04-02 PROCEDURE — 25010000002 HEPARIN (PORCINE) 25000-0.45 UT/250ML-% SOLUTION

## 2022-04-02 PROCEDURE — 85520 HEPARIN ASSAY: CPT | Performed by: INTERNAL MEDICINE

## 2022-04-02 PROCEDURE — G0378 HOSPITAL OBSERVATION PER HR: HCPCS

## 2022-04-02 PROCEDURE — 99232 SBSQ HOSP IP/OBS MODERATE 35: CPT | Performed by: INTERNAL MEDICINE

## 2022-04-02 PROCEDURE — 85520 HEPARIN ASSAY: CPT

## 2022-04-02 PROCEDURE — 99213 OFFICE O/P EST LOW 20 MIN: CPT | Performed by: CLINICAL NURSE SPECIALIST

## 2022-04-02 PROCEDURE — 96366 THER/PROPH/DIAG IV INF ADDON: CPT

## 2022-04-02 RX ORDER — LORATADINE 10 MG/1
10 TABLET ORAL DAILY
COMMUNITY

## 2022-04-02 RX ORDER — MONTELUKAST SODIUM 10 MG/1
10 TABLET ORAL NIGHTLY
COMMUNITY

## 2022-04-02 RX ORDER — GARLIC 180 MG
60 TABLET, DELAYED RELEASE (ENTERIC COATED) ORAL DAILY
COMMUNITY
End: 2022-04-03 | Stop reason: HOSPADM

## 2022-04-02 RX ORDER — VENLAFAXINE HYDROCHLORIDE 75 MG/1
150 CAPSULE, EXTENDED RELEASE ORAL DAILY
Status: DISCONTINUED | OUTPATIENT
Start: 2022-04-03 | End: 2022-04-03 | Stop reason: HOSPADM

## 2022-04-02 RX ORDER — METOPROLOL SUCCINATE 25 MG/1
25 TABLET, EXTENDED RELEASE ORAL
Status: DISCONTINUED | OUTPATIENT
Start: 2022-04-03 | End: 2022-04-03 | Stop reason: HOSPADM

## 2022-04-02 RX ORDER — BUPROPION HYDROCHLORIDE 150 MG/1
150 TABLET, EXTENDED RELEASE ORAL DAILY
COMMUNITY
End: 2022-07-25 | Stop reason: DRUGHIGH

## 2022-04-02 RX ORDER — MONTELUKAST SODIUM 10 MG/1
10 TABLET ORAL NIGHTLY
Status: DISCONTINUED | OUTPATIENT
Start: 2022-04-02 | End: 2022-04-03 | Stop reason: HOSPADM

## 2022-04-02 RX ORDER — CETIRIZINE HYDROCHLORIDE 10 MG/1
10 TABLET ORAL DAILY
Status: DISCONTINUED | OUTPATIENT
Start: 2022-04-02 | End: 2022-04-03 | Stop reason: HOSPADM

## 2022-04-02 RX ORDER — VENLAFAXINE HYDROCHLORIDE 150 MG/1
150 CAPSULE, EXTENDED RELEASE ORAL DAILY
COMMUNITY
End: 2022-08-19 | Stop reason: DRUGHIGH

## 2022-04-02 RX ORDER — BUPROPION HYDROCHLORIDE 150 MG/1
150 TABLET, EXTENDED RELEASE ORAL DAILY
Status: DISCONTINUED | OUTPATIENT
Start: 2022-04-02 | End: 2022-04-03 | Stop reason: HOSPADM

## 2022-04-02 RX ORDER — HEPARIN SODIUM 10000 [USP'U]/100ML
15 INJECTION, SOLUTION INTRAVENOUS
Status: DISCONTINUED | OUTPATIENT
Start: 2022-04-02 | End: 2022-04-02

## 2022-04-02 RX ADMIN — GABAPENTIN 300 MG: 300 CAPSULE ORAL at 15:01

## 2022-04-02 RX ADMIN — GABAPENTIN 300 MG: 300 CAPSULE ORAL at 21:06

## 2022-04-02 RX ADMIN — DULOXETINE HYDROCHLORIDE 30 MG: 30 CAPSULE, DELAYED RELEASE ORAL at 06:12

## 2022-04-02 RX ADMIN — APIXABAN 5 MG: 5 TABLET, FILM COATED ORAL at 08:44

## 2022-04-02 RX ADMIN — ACETAMINOPHEN 650 MG: 325 TABLET ORAL at 12:21

## 2022-04-02 RX ADMIN — BUPROPION HYDROCHLORIDE 150 MG: 150 TABLET, EXTENDED RELEASE ORAL at 16:42

## 2022-04-02 RX ADMIN — ACETAMINOPHEN 650 MG: 325 TABLET ORAL at 07:54

## 2022-04-02 RX ADMIN — Medication 10 ML: at 21:07

## 2022-04-02 RX ADMIN — ACETAMINOPHEN 650 MG: 325 TABLET ORAL at 18:41

## 2022-04-02 RX ADMIN — ACETAMINOPHEN 650 MG: 325 TABLET ORAL at 03:46

## 2022-04-02 RX ADMIN — APIXABAN 5 MG: 5 TABLET, FILM COATED ORAL at 21:06

## 2022-04-02 RX ADMIN — MONTELUKAST 10 MG: 10 TABLET, FILM COATED ORAL at 21:07

## 2022-04-02 RX ADMIN — GABAPENTIN 300 MG: 300 CAPSULE ORAL at 05:19

## 2022-04-02 RX ADMIN — Medication 10 ML: at 08:44

## 2022-04-02 RX ADMIN — CETIRIZINE HYDROCHLORIDE 10 MG: 10 TABLET, FILM COATED ORAL at 16:42

## 2022-04-02 NOTE — PROGRESS NOTES
"    [facility] Medicine Services  PROGRESS NOTE    Patient Name: Naima Paul  : 1965  MRN: [id number]    Date of Admission: 3/30/2022  Primary Care Physician: Betty Figueroa APRN    Subjective   Subjective     CC:  R arm paresthesias, headaches, syncope    HPI: HA continues to improved slowly; at worst it was 3/10 intensity last night.  Still feels \"woozy\".  No focal numbness or weakness    ROS:  Gen- No fevers, chills  CV- No chest pain, palpitations  Resp- No cough, dyspnea  GI- No N/V/D, abd pain        Objective   Objective     Vital Signs:   Temp:  [98 °F (36.7 °C)-99.1 °F (37.3 °C)] 98.1 °F (36.7 °C)  Heart Rate:  [] 73  Resp:  [18-20] 18  BP: (116-153)/(74-90) 138/90     Physical Exam:  Constitutional: Awake, alert , in bed, on phone.  Family present  Eyes: PER, sclerae anicteric, no conjunctival injection  HENT: NCAT, mucous membranes moist  Neck: Supple, trachea midline  Respiratory: Clear to auscultation bilaterally, nonlabored respirations   Cardiovascular: RRR, no murmurs, rubs  Gastrointestinal: Positive bowel sounds, soft, nontender, nondistended  Musculoskeletal: No bilateral ankle edema, no clubbing or cyanosis to extremities  Psychiatric: Appropriate affect, cooperative  Neurologic: Oriented x 3, strength symmetric in all extremities, Cranial Nerves grossly intact to confrontation, speech clear,  Skin: No rashes      Results Reviewed:  LAB RESULTS:      Lab 22  0536 22  0212 22  1758 22  0827 22  0038 22  1816 22  1155 22  0801 22  2058 22  1711 22  1706   WBC  --   --   --  7.35  --   --   --  7.95  8.09  --  11.44*  --    HEMOGLOBIN  --   --   --  11.9*  --   --   --  11.7*  11.7*  --  13.0  --    HEMOGLOBIN, POC  --   --   --   --   --   --   --   --   --   --  14.3   HEMATOCRIT  --   --   --  39.2  --   --   --  36.3  37.1  --  40.5  --    HEMATOCRIT POC  --   --   --   --   --   --   -- "   --   --   --  42   PLATELETS  --   --   --  297  --   --   --  348  316  --  372  --    NEUTROS ABS  --   --   --  3.84  --   --   --  5.09  5.27  --  8.44*  --    IMMATURE GRANS (ABS)  --   --   --  0.04  --   --   --  0.03  0.04  --  0.09*  --    LYMPHS ABS  --   --   --  2.34  --   --   --  1.90  1.87  --  1.67  --    MONOS ABS  --   --   --  0.81  --   --   --  0.72  0.69  --  1.01*  --    EOS ABS  --   --   --  0.27  --   --   --  0.19  0.19  --  0.19  --    MCV  --   --   --  91.4  --   --   --  85.6  87.5  --  85.4  --    PROCALCITONIN  --   --   --   --   --   --   --   --   --  <0.20  --    LACTATE  --   --   --   --   --   --   --   --  1.1 3.4*  --    PROTIME  --   --   --   --   --   --  13.3  --   --   --   --    APTT  --   --   --   --   --   --  29.2*  --   --   --   --    HEPARIN ANTI-XA 0.76* >1.10* >1.10* 0.10* 0.10*   < > 0.10*  --   --   --   --     < > = values in this interval not displayed.         Lab 04/01/22  0827 03/31/22  2044 03/31/22  0801 03/30/22  1711 03/30/22  1706   SODIUM 139  --  136 142  --    POTASSIUM 4.1 4.1 3.2* 3.9  --    CHLORIDE 104  --  101 103  --    CO2 25.0  --  24.0 22.0  --    ANION GAP 10.0  --  11.0 17.0*  --    BUN 7  --  7 14  --    CREATININE 0.54*  --  0.64 0.94 1.00   EGFR 107.5  --  103.2 70.9 65.8   GLUCOSE 128*  --  149* 163*  --    CALCIUM 8.9  --  9.1 9.7  --    MAGNESIUM  --   --  2.4  --   --    HEMOGLOBIN A1C  --   --  5.90*  --   --    TSH  --   --  0.879  --   --          Lab 03/30/22  1711   TOTAL PROTEIN 7.3   ALBUMIN 4.30   GLOBULIN 3.0   ALT (SGPT) 16   AST (SGOT) 18   BILIRUBIN 0.2   ALK PHOS 99         Lab 03/31/22  1155 03/30/22  2058   TROPONIN T  --  <0.010   PROTIME 13.3  --    INR 1.04  --          Lab 03/31/22  0801   CHOLESTEROL 151   LDL CHOL 99   HDL CHOL 32*   TRIGLYCERIDES 111             Brief Urine Lab Results  (Last result in the past 365 days)      Color   Clarity   Blood   Leuk Est   Nitrite   Protein   CREAT   Urine  HCG        03/30/22 2055 Yellow   Clear   Negative   Negative   Negative   Negative                 Microbiology Results Abnormal     Procedure Component Value - Date/Time    COVID-19 and FLU A/B PCR - Swab, Nasopharynx [[id number]]  (Normal) Collected: 03/30/22 1941    Lab Status: Final result Specimen: Swab from Nasopharynx Updated: 03/30/22 2022     COVID19 Not Detected     Influenza A PCR Not Detected     Influenza B PCR Not Detected    Narrative:      Fact sheet for providers: https://www.fda.gov/media/715378/download    Fact sheet for patients: https://www.fda.gov/media/023271/download    Test performed by PCR.          Adult Transthoracic Echo Complete W/ Cont if Necessary Per Protocol (With Agitated Saline)    Result Date: 3/31/2022  · Left ventricular ejection fraction appears to be 61 - 65%. Left ventricular systolic function is normal. · Left ventricular wall thickness is consistent with borderline concentric hypertrophy.        Results for orders placed during the hospital encounter of 03/30/22    Adult Transthoracic Echo Complete W/ Cont if Necessary Per Protocol (With Agitated Saline)    Interpretation Summary  · Left ventricular ejection fraction appears to be 61 - 65%. Left ventricular systolic function is normal.  · Left ventricular wall thickness is consistent with borderline concentric hypertrophy.      I have reviewed the medications:  Scheduled Meds:apixaban, 5 mg, Oral, Q12H  DULoxetine, 30 mg, Oral, QAM  gabapentin, 300 mg, Oral, Q8H  Pharmacy Meds to Bed Consult, , Does not apply, Daily  sodium chloride, 10 mL, Intravenous, Q12H      Continuous Infusions:   PRN Meds:.•  acetaminophen  •  HYDROcodone-acetaminophen  •  melatonin  •  ondansetron **OR** ondansetron  •  potassium chloride **OR** potassium chloride **OR** potassium chloride  •  sodium chloride    Assessment/Plan   Assessment & Plan     Active Hospital Problems    Diagnosis  POA   • Left arm weakness [R29.898]  Yes   • Suspected  cerebrovascular accident (CVA) [R09.89]  Yes   • HTN (hypertension) [I10]  Yes   • Syncope and collapse [R55]  Yes   • Anxiety associated with depression [F41.8]  Yes   • Leukocytosis [D72.829]  Yes   • Personal history of COVID-19 [Z86.16]  Yes      Resolved Hospital Problems   No resolved problems to display.        Brief Hospital Course to date:  Naima Paul is a 57 y.o. female  w/ a hx of HTN, anxiety, depression who presented to the ED w/ c/o weakness, paresthesias, transient vision loss and syncope.  She was diagnosed with COVID19 a month ago.     Venous sinus thrombosis  Intracranial hypertension, symptomatic (headache, syncope, R arm paresthesias)   - in setting of recent COVID19  - neurologist appreciated   - convert hep gtt to Eliquis today.   Watch another day       Leukocytosis, resolved  - CXR nad UA unremarkable      HTN -hold routine HCTZ and Toprol since BP still controlled      DVT prophylaxis:  Mechanical  CODE STATUS:  full  Code Status (Patient has no pulse and is not breathing): CPR (Attempt to Resuscitate)  Medical Interventions (Patient has pulse or is breathing): Full Support     DVT prophylaxis:  Medical and mechanical DVT prophylaxis orders are present.       AM-PAC 6 Clicks Score (PT): 24 (04/02/22 0800)    Disposition: I expect the patient to be discharged when OK with neurology.     CODE STATUS:   Code Status and Medical Interventions:   Ordered at: 03/30/22 1950     Code Status (Patient has no pulse and is not breathing):    CPR (Attempt to Resuscitate)     Medical Interventions (Patient has pulse or is breathing):    Full Support       Aubrie Centeno MD  04/02/22

## 2022-04-02 NOTE — PROGRESS NOTES
Patient is on Apixaban.  Education provided on 4/2 verbally and in writing.  Information provided includes effects of medication, drug-drug and drug-food interactions, and signs/symptoms of bleeding and clotting.  Patient verbalized understanding through teach back.  All pertinent questions were answered.      Statement Selected

## 2022-04-02 NOTE — PLAN OF CARE
Goal Outcome Evaluation:  Plan of Care Reviewed With: patient, spouse        Progress: improving  Outcome Evaluation: A & O x 4, NSR, RA, c/o frontal & temporal HA with photophobia - see MAR, intermittent numbness & tingling B hands, Heparin gtt transitioned to Eliquis, sz precautions in place, plan is home when BP and HAs have improved, Jin 19, Falls score 16/bed alarm active, will continue to monitor

## 2022-04-02 NOTE — PROGRESS NOTES
"Neurology       Patient Care Team:  Betty Figueroa APRN as PCP - General (Family Medicine)    Chief complaint headache, right side heaviness       Subjective .     History:    Sitting in bed, no new neurologic complaints.   continues to be at bedside.  Patient continues to have fluctuating headache symptoms, although she does attest to worst headache being 5 out of 10.  At this time just a dull headache.  Heparin therapeutic, patient has been changed over to Eliquis 5 mg twice daily.  Continues to have some episodes of dizziness upon standing, denies nausea or vomiting.    ROS: +headache, blurry vision   All other systems negative    Objective     Vital Signs   Blood pressure 138/90, pulse 104, temperature 98.1 °F (36.7 °C), temperature source Oral, resp. rate 18, height 160 cm (63\"), weight 93 kg (205 lb), SpO2 92 %.    Physical Exam:  Adult woman, awake and alert, oriented to person/place/time. Speech is clear, no dysarthria or aphasia. EOMI, VF intact to threat, no facial weakness, hearing intact, tongue midline. Motor nml tone and bulk, no drift, upper 5/5, lower 5/5. Sensation intact to light touch.    Results Review:               MRI brain personally reviewed no acute stroke  MRV brain personally reviewed showing superior sagittal and right transverse occlusion/consistent w/ cerebral venous thrombosis    2d Echo  · Left ventricular ejection fraction appears to be 61 - 65%. Left ventricular systolic function is normal.  · Left ventricular wall thickness is consistent with borderline concentric hypertrophy  · Saline test results negative     a1c 5.9, ldl 99  ua negative for infection  uds negative  Heparin anti-Xa level this a.m. less than 0.10  Hemoglobin 11.9, hematocrit 39.2, platelets 297      MRI Angiogram Venogram Head    Result Date: 3/31/2022  Abnormal signal within superior sagittal, right transverse, and right sigmoid sinuses, suggesting venous sinus thrombosis.  This report was finalized " on 3/31/2022 10:47 AM by Rashaun Escoto MD.          Assessment/Plan     Cerebral venous thrombosis in setting of recent COVID infection complicated by headache and syncope due to increased ICP. No ischemic changes on MRI brain. EEG nml. Echo reassuring.      -heparin gtt now discontinued, Eliquis 5 mg twice daily for at least 3 months.  -gabapentin 300mg tid, will discharge home with gabapentin  -prn lortab, patient should not be discharged home with as needed Lortab.  Can use Tylenol as needed for breakthrough headaches  -avoid nonsteroidal OTC meds for headache  -Okay to restart patient's home meds including blood pressure and antihistamine if needed  -Patient will need repeat MRV in about 3 months, and follow-up in clinic    Discussed plan with patient and  at bedside.  Educated patient that headache we will likely continue for the next couple weeks, however should continue to get better with time.  All questions answered.  No further neurologic work-up needed, patient okay to discharge home when blood pressure and headache is improved.  Will sign off, please call with questions or concerns.         Noelle Rock, BERE  04/02/22  09:45 EDT

## 2022-04-03 VITALS
OXYGEN SATURATION: 91 % | TEMPERATURE: 98.6 F | SYSTOLIC BLOOD PRESSURE: 141 MMHG | BODY MASS INDEX: 36.32 KG/M2 | RESPIRATION RATE: 18 BRPM | WEIGHT: 205 LBS | DIASTOLIC BLOOD PRESSURE: 88 MMHG | HEIGHT: 63 IN | HEART RATE: 89 BPM

## 2022-04-03 PROBLEM — G08 ACUTE CEREBRAL VENOUS SINUS THROMBOSIS: Status: ACTIVE | Noted: 2022-03-30

## 2022-04-03 PROCEDURE — G0378 HOSPITAL OBSERVATION PER HR: HCPCS

## 2022-04-03 PROCEDURE — 99239 HOSP IP/OBS DSCHRG MGMT >30: CPT | Performed by: INTERNAL MEDICINE

## 2022-04-03 RX ORDER — GABAPENTIN 300 MG/1
300 CAPSULE ORAL EVERY 8 HOURS SCHEDULED
Qty: 42 CAPSULE | Refills: 0 | Status: SHIPPED | OUTPATIENT
Start: 2022-04-03 | End: 2022-05-20 | Stop reason: SDUPTHER

## 2022-04-03 RX ADMIN — ACETAMINOPHEN 650 MG: 325 TABLET ORAL at 12:17

## 2022-04-03 RX ADMIN — METOPROLOL SUCCINATE 25 MG: 25 TABLET, EXTENDED RELEASE ORAL at 08:11

## 2022-04-03 RX ADMIN — BUPROPION HYDROCHLORIDE 150 MG: 150 TABLET, EXTENDED RELEASE ORAL at 08:11

## 2022-04-03 RX ADMIN — VENLAFAXINE HYDROCHLORIDE 150 MG: 75 CAPSULE, EXTENDED RELEASE ORAL at 08:11

## 2022-04-03 RX ADMIN — GABAPENTIN 300 MG: 300 CAPSULE ORAL at 05:41

## 2022-04-03 RX ADMIN — APIXABAN 5 MG: 5 TABLET, FILM COATED ORAL at 08:11

## 2022-04-03 RX ADMIN — Medication 10 ML: at 08:11

## 2022-04-03 RX ADMIN — ACETAMINOPHEN 650 MG: 325 TABLET ORAL at 01:35

## 2022-04-03 RX ADMIN — ACETAMINOPHEN 650 MG: 325 TABLET ORAL at 07:34

## 2022-04-03 RX ADMIN — CETIRIZINE HYDROCHLORIDE 10 MG: 10 TABLET, FILM COATED ORAL at 08:11

## 2022-04-03 NOTE — DISCHARGE SUMMARY
[facility]  DISCHARGE SUMMARY    Patient Name: Naima Paul  : 1965  MRN: [id number]    Date of Admission: 3/30/2022  4:56 PM  Date of Discharge:  4/3/2022  Primary Care Physician: Betty Figueroa APRN    Consults     No orders found from 3/1/2022 to 3/31/2022.          Hospital Course     Presenting Problem:   Cerebrovascular accident (CVA), unspecified mechanism (HCC) [I63.9]  Left arm weakness [R29.898]    Active Hospital Problems    Diagnosis  POA   • **Acute cerebral venous sinus thrombosis [G08]  Yes     Priority: High   • Left arm weakness [R29.898]  Yes   • HTN (hypertension) [I10]  Yes   • Syncope and collapse [R55]  Yes   • Anxiety associated with depression [F41.8]  Yes   • Leukocytosis [D72.829]  Yes   • Personal history of COVID-19 [Z86.16]  Yes      Resolved Hospital Problems   No resolved problems to display.          Hospital Course:  Naima Paul is a 57 y.o. female  with a hx of HTN, anxiety, depression who presented to the ED w/ c/o weakness, paresthesias and syncope.  She was diagnosed w/ COVID-19 on 2/15/2022. Her COVID symptoms have largely improved.  She came to ED due to an acute episode of R side numbness that waxed and waned over a day, also transient vision loss and syncope.  Her husbnad found her unresponsive on the floor.     Brain imaging with MRV revealed venous sinus thrombosis.  She was started on heparin.  For her headaches, gabapentin was started.  She has improved somewhat but still feels unsteady on her feet and still has chronic headache, intensity 3-5 out of 10.      She is going home on Eliquis with a plan for followup in neurology clinic in 3 months.  Her  is at home all day.  She is advised not to drive until her mentation returns to normal and she is not sedated on neurontin.      I am prescribing Neurontin 300mg #42 with instructions to taper as possible, or call the neurology clinic if she needs a  refill.      Discharge Follow Up Recommendations for outpatient labs/diagnostics:   *PCP follow up in one week    * [facility] Neuro followup in 3 months with repeat MRV    Day of Discharge     HPI:  Headache was approx 5/10 intensity last night.  She still feels unsteady.  No further focal numbness.  She has continued to note a 'gray spot' in her central visual field.    Review of Systems  Gen- No fevers, chills  CV- No chest pain, palpitations  Resp- No cough, dyspnea  GI- No N/V/D, abd pain        Vital Signs:   Temp:  [98.1 °F (36.7 °C)-98.7 °F (37.1 °C)] 98.6 °F (37 °C)  Heart Rate:  [] 73  Resp:  [18] 18  BP: (126-162)/(74-95) 141/88      Physical Exam:  Constitutional: Awake, alert, NAD in bed   Eyes: PER sclerae anicteric, no conjunctival injection  HENT: NCAT, mucous membranes moist  Neck: Supple,  trachea midline  Respiratory: Clear to auscultation bilaterally, nonlabored respirations   Cardiovascular: RRR, no murmurs  Gastrointestinal: Positive bowel sounds, soft, nontender, nondistended  Musculoskeletal: No bilateral ankle edema, no clubbing or cyanosis to extremities  Psychiatric: Appropriate affect, cooperative  Neurologic: Oriented x 3, strength symmetric in all extremities, Cranial Nerves grossly intact to confrontation, speech clear  Skin: No rashes      Pertinent  and/or Most Recent Results     LAB RESULTS:      Lab 04/02/22  0536 04/02/22  0212 04/01/22  1758 04/01/22  0827 04/01/22  0038 03/31/22  1816 03/31/22  1155 03/31/22  0801 03/30/22  2058 03/30/22  1711 03/30/22  1706   WBC  --   --   --  7.35  --   --   --  7.95  8.09  --  11.44*  --    HEMOGLOBIN  --   --   --  11.9*  --   --   --  11.7*  11.7*  --  13.0  --    HEMOGLOBIN, POC  --   --   --   --   --   --   --   --   --   --  14.3   HEMATOCRIT  --   --   --  39.2  --   --   --  36.3  37.1  --  40.5  --    HEMATOCRIT POC  --   --   --   --   --   --   --   --   --   --  42   PLATELETS  --   --   --  297  --   --   --  348  316  --   372  --    NEUTROS ABS  --   --   --  3.84  --   --   --  5.09  5.27  --  8.44*  --    IMMATURE GRANS (ABS)  --   --   --  0.04  --   --   --  0.03  0.04  --  0.09*  --    LYMPHS ABS  --   --   --  2.34  --   --   --  1.90  1.87  --  1.67  --    MONOS ABS  --   --   --  0.81  --   --   --  0.72  0.69  --  1.01*  --    EOS ABS  --   --   --  0.27  --   --   --  0.19  0.19  --  0.19  --    MCV  --   --   --  91.4  --   --   --  85.6  87.5  --  85.4  --    PROCALCITONIN  --   --   --   --   --   --   --   --   --  <0.20  --    LACTATE  --   --   --   --   --   --   --   --  1.1 3.4*  --    PROTIME  --   --   --   --   --   --  13.3  --   --   --   --    APTT  --   --   --   --   --   --  29.2*  --   --   --   --    HEPARIN ANTI-XA 0.76* >1.10* >1.10* 0.10* 0.10*   < > 0.10*  --   --   --   --     < > = values in this interval not displayed.         Lab 04/01/22  0827 03/31/22  2044 03/31/22  0801 03/30/22  1711 03/30/22  1706   SODIUM 139  --  136 142  --    POTASSIUM 4.1 4.1 3.2* 3.9  --    CHLORIDE 104  --  101 103  --    CO2 25.0  --  24.0 22.0  --    ANION GAP 10.0  --  11.0 17.0*  --    BUN 7  --  7 14  --    CREATININE 0.54*  --  0.64 0.94 1.00   EGFR 107.5  --  103.2 70.9 65.8   GLUCOSE 128*  --  149* 163*  --    CALCIUM 8.9  --  9.1 9.7  --    MAGNESIUM  --   --  2.4  --   --    HEMOGLOBIN A1C  --   --  5.90*  --   --    TSH  --   --  0.879  --   --          Lab 03/30/22  1711   TOTAL PROTEIN 7.3   ALBUMIN 4.30   GLOBULIN 3.0   ALT (SGPT) 16   AST (SGOT) 18   BILIRUBIN 0.2   ALK PHOS 99         Lab 03/31/22  1155 03/30/22 2058   TROPONIN T  --  <0.010   PROTIME 13.3  --    INR 1.04  --          Lab 03/31/22  0801   CHOLESTEROL 151   LDL CHOL 99   HDL CHOL 32*   TRIGLYCERIDES 111             Brief Urine Lab Results  (Last result in the past 365 days)      Color   Clarity   Blood   Leuk Est   Nitrite   Protein   CREAT   Urine HCG        03/30/22 2055 Yellow   Clear   Negative   Negative   Negative    Negative               Microbiology Results (last 10 days)     Procedure Component Value - Date/Time    COVID-19 and FLU A/B PCR - Swab, Nasopharynx [[id number]]  (Normal) Collected: 03/30/22 1941    Lab Status: Final result Specimen: Swab from Nasopharynx Updated: 03/30/22 2022     COVID19 Not Detected     Influenza A PCR Not Detected     Influenza B PCR Not Detected    Narrative:      Fact sheet for providers: https://www.fda.gov/media/015137/download    Fact sheet for patients: https://www.fda.gov/media/042411/download    Test performed by PCR.          Adult Transthoracic Echo Complete W/ Cont if Necessary Per Protocol (With Agitated Saline)    Result Date: 3/31/2022  · Left ventricular ejection fraction appears to be 61 - 65%. Left ventricular systolic function is normal. · Left ventricular wall thickness is consistent with borderline concentric hypertrophy.      EEG    Result Date: 3/31/2022  Reason for referral: 57 y.o.female with syncope, speech changes, consideration of seizure Technical Summary:  A 19 channel digital EEG was performed using the international 10-20 placement system, including eye leads and EKG leads. Duration: 20 minutes Findings: The awake tracing shows a low amplitude well-regulated 10 Hz posterior rhythm present symmetrically over the occipital and posterior parietal leads.  Low to medium amplitude intermixed theta and alpha activity is seen anteriorly along with intermixed EMG and eye blink artifact.  Photic stimulation elicits a symmetric driving response.  Hyperventilation is not performed.  Light drowsiness is seen with mild slowing of the background but stage II sleep is not seen.  No focal features or epileptiform activity are seen. Video: Off Technical quality: Good EKG: Regular, 70 bpm SUMMARY: Normal EEG in the awake and lightly drowsy states No focal features or epileptiform activity are seen     Normal study This report is transcribed using the Dragon dictation system.      CT  Angiogram Neck    Result Date: 3/30/2022  DATE OF EXAM: 3/30/2022 5:01 PM  PROCEDURE: CT ANGIOGRAM NECK-  INDICATIONS: Stroke, follow up  COMPARISON: No comparisons available.  TECHNIQUE: CTA of the head and CTA of the neck was performed after the intravenous administration of 125 mL of Isovue 370. Reconstructed coronal and sagittal images were also obtained. In addition, a 3-D volume rendered image was obtained after post processing. Automated exposure control and iterative reconstruction methods were used. AI analysis of LVO was utilized for the CTA Head imaging portion of the study.  The radiation dose reduction device was turned on for each scan per the ALARA (As Low as Reasonably Achievable) protocol.  Stenosis measurement was performed by the NASCET or similar method.  FINDINGS:  Images of the proximal carotid and vertebral arteries are degraded as a result of body habitus and x-ray beam scatter. No significant common carotid stenosis is suspected.  On the right, common carotid, internal and external carotid arteries appear unremarkable to the level of the skull base.  On the left, common, internal and external carotid arteries appear within normal limits as well.  Right vertebral artery is a relatively robust vessel, normal in appearance along its length to the level of the skull base.  Left vertebral artery is a small vessel of inconsistent diameter, change is diameter along its course, and appearing quite small within the posterior fossa. No particular features are seen to suggest clot or to favor a dissection.  Elsewhere at the level of this scan, no significant soft tissue neck pathology is seen Other than perhaps mildly enlarged adenoids. Included lung apices appear clear.         1. No evidence of hemodynamically significant carotid or right vertebral artery stenosis in the neck. 2. Hypoplastic appearing left vertebral artery, which is inconsistent in diameter along its length, possibly as a  developmental variant. No evidence of left vertebral occlusion. 3. Incidentally noted enlarged adenoids.  This report was finalized on 3/30/2022 6:03 PM by Dr. Taye Mayfield MD.      MRI Brain Without Contrast    Result Date: 3/31/2022  MRI BRAIN WO CONTRAST-  Date of Exam: 3/30/2022 9:55 PM  Indication: Stroke, follow up; I63.9-Cerebral infarction, unspecified; R20.2-Paresthesia of skin; R53.1-Weakness.  Technique: Routine multiplanar multisequence MR imaging of the brain was performed without the administration of   gadolinium contrast.  Comparison Exams: CTs performed same day  FINDINGS: There are no areas of restricted diffusion.    There is no evidence of intracranial hemorrhage.  There is no mass, mass effect, or hydrocephalus.   No abnormal extra-axial fluid collections are seen.    Increased heterogeneous T1 signal seen within the sagittal sinus without corresponding findings in the straight sinus or additional major sinuses..   Sella and suprasellar cistern are within normal limits.    Cranio-vertebral junction is normal.  Globes and orbits are within normal limits.  Near complete opacification of the right mastoid air cells is noted. Left mastoid air cells and visualized paranasal sinuses are clear       1. No acute diffusion restriction to suggest ischemia 2. Abnormal appearance of the sagittal sinus with heterogeneous areas of T1 signal. Findings may be artifactual in nature however developing venous sinus thrombosis is a consideration. Further evaluation with CT venogram or MR venogram recommended. 3. Right mastoid effusion, possible mastoiditis  Findings were called to the patient's nurse at the time of interpretation..  This report was finalized on 3/31/2022 12:40 AM by Rudi Bartholomew.      XR Chest 1 View    Result Date: 3/30/2022   DATE OF EXAM: 3/30/2022 4:58 PM  PROCEDURE: XR CHEST 1 VW-  INDICATIONS: Stroke Protocol (Onset > 12 Hrs)  COMPARISON: No Comparisons Available  TECHNIQUE: Portable Chest   FINDINGS:  Heart size is within normal limits.  Lung volumes are low with vascular crowding and atelectasis. No focal consolidation noted. Osseous structures are unremarkable      IMPRESSION : Negative low lung volume portable chest[  This report was finalized on 3/30/2022 6:15 PM by Rudi Bartholomew.      MRI Angiogram Venogram Head    Result Date: 3/31/2022  DATE OF EXAM: 3/31/2022 2:00 AM  PROCEDURE: MRI ANGIOGRAM VENOGRAM HEAD-  INDICATIONS: Stroke, follow up; I63.9-Cerebral infarction, unspecified; R20.2-Paresthesia of skin; R53.1-Weakness  COMPARISON: MRI brain and CT angiography head from March 30, 2022  TECHNIQUE: MRI venogram of the head was performed without IV contrast using time-of-flight technique.  FINDINGS: There is abnormal signal within the superior sagittal sinus and the right transverse and sigmoid sinuses suggesting venous sinus thrombosis. The straight sinus and left transverse and sigmoid sinuses appear widely patent.      Abnormal signal within superior sagittal, right transverse, and right sigmoid sinuses, suggesting venous sinus thrombosis.  This report was finalized on 3/31/2022 10:47 AM by Rashaun Escoto MD.      CT Head Without Contrast Stroke Protocol    Result Date: 3/30/2022  DATE OF EXAM: 3/30/2022 4:54 PM  PROCEDURE: CT HEAD WO CONTRAST STROKE PROTOCOL-  INDICATIONS: Stroke, follow up  COMPARISON: No comparisons available.  TECHNIQUE: Routine transaxial and coronal reconstruction images were obtained through the head without the administration of contrast. Automated exposure control and iterative reconstruction methods were used.  The radiation dose reduction device was turned on for each scan per the ALARA (As Low as Reasonably Achievable) protocol.  FINDINGS: The calvarium appears intact. Included paranasal sinuses mastoids and middle ear spaces appear clear. Soft tissue window images show mild degree of generalized cerebral atrophy appropriate for age. There is no evidence of  acute or old infarction, no evidence of intracranial hemorrhage, mass, mass effect, hydrocephalus, or abnormal extra-axial collection. Gray/white matter interface is well maintained.      No evidence of acute intracranial disease.    Note: Exam time is shown as 4:58 PM. Study was reviewed on the CT scan monitor and discussed with Dr. Cochran at approximately 5:03 PM.  This report was finalized on 3/30/2022 5:13 PM by Dr. Taye Mayfield MD.      CT Angiogram Head w AI Analysis of LVO    Result Date: 3/30/2022  EXAMINATION: CT ANGIOGRAM HEAD W AI ANALYSIS OF LVO-  INDICATION: Stroke, follow up  TECHNIQUE: Pre and postcontrast 3 mm and 0.75 mm axial images through the brain with sagittal and coronal 2-D reconstructions and 3-D VRT and MIP angiographic reconstructions.  The radiation dose reduction device was turned on for each scan per the ALARA (As Low as Reasonably Achievable) protocol.  AI analysis of LVO was utilized.  COMPARISON: NONE  FINDINGS: Intracranial portions of the internal carotid arteries appear normal in caliber. Anterior and middle cerebral arteries and proximal branches appear grossly normal with no apparent focal stenosis. Right vertebral artery appears significantly larger than left and normal in appearance. Left vertebral artery shows a markedly narrowed distal 2 cm segment compared to the proximal portion of vertebral artery, whether as a result of underlying occult stenosis, or as a developmental variant. Basilar artery and posterior cerebral arteries appear grossly normal, although the posterior cerebral arteries are not well seen out into the periphery. No evidence of vascular malformation is appreciated.        1. Segmental narrowing of the distal 2 cm of the left vertebral artery, perhaps as a developmental variant. No definite underlying focal stenosis is seen. Although vertebral artery dissection can cause segmental narrowing, No particular features are seen to suggest this. 2. No evidence of  hemodynamically significant intracranial vascular stenosis is seen elsewhere.   This report was finalized on 3/30/2022 6:12 PM by Dr. Taye Mayfield MD.      CT CEREBRAL PERFUSION WITH & WITHOUT CONTRAST    Result Date: 3/30/2022  DATE OF EXAM: 3/30/2022 5:01 PM  PROCEDURE: CT CEREBRAL PERFUSION W WO CONTRAST-  INDICATIONS: Neuro deficit, acute, stroke suspected  COMPARISON: No comparisons available.  TECHNIQUE: Routine transaxial cuts were obtained through the head without administration of contrast. Routine transaxial cuts were then obtained through the head following the intravenous administration of 125 mL of Isovue 300. Core blood volume, core blood flow, mean transit time, and Tmax images were obtained utilizing the Rapid software protocol. A limited CT angiogram of the head was also performed to measure the blood vessel density.  The radiation dose reduction device was turned on for each scan per the ALARA (As Low as Reasonably Achievable) protocol.  FINDINGS: Rapid analysis shows no areas of the brain demonstrating cerebral blood flow less than 30% or T-Max greater than six seconds. Individual perfusion maps, appear to show mild asymmetry of blood flow with decreased time to drain and mean transit time generally in right posterior cerebral artery distribution. Subjectively, there may be slightly decreased cerebral blood flow here as well but this is a more questionable finding and cerebral blood volume appears normal. No focal asymmetry or perfusion is seen elsewhere to suggest focal ischemia.       1. Negative rapid analysis for large vessel occlusion or ischemia. 2. Mild asymmetry of blood flow in the right posterior temporal and occipital lobe, of only questionable significance, possibly relatively slow blood flow in this area. No corresponding abnormality to suggest ischemia or infarction.  This report was finalized on 3/30/2022 5:28 PM by Dr. Taye Mayfield MD.                Results for orders placed during  "the hospital encounter of 03/30/22    Adult Transthoracic Echo Complete W/ Cont if Necessary Per Protocol (With Agitated Saline)    Interpretation Summary  · Left ventricular ejection fraction appears to be 61 - 65%. Left ventricular systolic function is normal.  · Left ventricular wall thickness is consistent with borderline concentric hypertrophy.      Discharge Details        Discharge Medications      New Medications      Instructions Start Date   apixaban 5 MG tablet tablet  Commonly known as: ELIQUIS   5 mg, Oral, Every 12 Hours Scheduled      gabapentin 300 MG capsule  Commonly known as: NEURONTIN   300 mg, Oral, Every 8 Hours Scheduled         Continue These Medications      Instructions Start Date   buPROPion  MG 12 hr tablet  Commonly known as: WELLBUTRIN SR   150 mg, Oral, Daily      loratadine 10 MG tablet  Commonly known as: CLARITIN   10 mg, Oral, Daily      metoprolol succinate XL 50 MG 24 hr tablet  Commonly known as: TOPROL-XL   metoprolol succinate ER 50 mg tablet,extended release 24 hr   TAKE 1 TABLET BY MOUTH ONCE DAILY \"PT MUST BE SEEN FOR ADDITIONAL REFILS\"      montelukast 10 MG tablet  Commonly known as: SINGULAIR   10 mg, Oral, Nightly      venlafaxine  MG 24 hr capsule  Commonly known as: EFFEXOR-XR   150 mg, Oral, Daily      Vitamin D3 50 MCG (2000 UT) capsule   2,000 Units, Oral, Daily         Stop These Medications    Ginkgo Biloba 60 MG capsule            Allergies   Allergen Reactions   • Ibuprofen Swelling and Rash         Discharge Disposition:  Home or Self Care    Diet:  Hospital:  Diet Order   Procedures   • Diet Regular; Cardiac       Activity: As tolerated       Restrictions or Other Recommendations: Advised her to stop taking ginkgo.  Discussed bleeding risks of Eliquis. For uncontrolled headache, tylenol and gabapentin.          CODE STATUS:    Code Status and Medical Interventions:   Ordered at: 03/30/22 1950     Code Status (Patient has no pulse and is not " breathing):    CPR (Attempt to Resuscitate)     Medical Interventions (Patient has pulse or is breathing):    Full Support       No future appointments.    Additional Instructions for the Follow-ups that You Need to Schedule     Discharge Follow-up with PCP   As directed       Currently Documented PCP:    Betty Figueroa APRN    PCP Phone Number:    [phone]     Follow Up Details: one week         Discharge Follow-up with Specialty: neurology clinic, needs MRV; 3 Months   As directed      Specialty: neurology clinic, needs MRV    Follow Up: 3 Months                     Aubrie Centeno MD  04/03/22      Time Spent on Discharge:  I spent  40  minutes on this discharge activity which included: face-to-face encounter with the patient, reviewing the data in the system, coordination of the care with the nursing staff as well as consultants, documentation, and entering orders.

## 2022-05-03 NOTE — TELEPHONE ENCOUNTER
Rx Refill Note  Requested Prescriptions     Pending Prescriptions Disp Refills   • apixaban (ELIQUIS) 5 MG tablet tablet 60 tablet 5     Sig: Take 1 tablet by mouth Every 12 (Twelve) Hours. Indications: Other - full anticoagulation      Last office visit with prescribing clinician: Visit date not found      Next office visit with prescribing clinician: 6/27/2022            Vee Lucas MA  05/03/22, 13:29 EDT

## 2022-05-03 NOTE — TELEPHONE ENCOUNTER
Caller: Naima Paul    Relationship: Self    Best call back number: [phone]    Requested Prescriptions:   Requested Prescriptions     Pending Prescriptions Disp Refills   • apixaban (ELIQUIS) 5 MG tablet tablet 60 tablet 5     Sig: Take 1 tablet by mouth Every 12 (Twelve) Hours. Indications: Other - full anticoagulation        Pharmacy where request should be sent: [id number]    Additional details provided by patient: PATIENT WAS PRESCRIBED THIS MEDICATION WHILE IN THE HOSPITAL; WILL RUN OUT TODAY.    Does the patient have less than a 3 day supply:  [x] Yes  [] No    Terry Foss Rep   05/03/22 13:25 EDT

## 2022-05-16 ENCOUNTER — TELEPHONE (OUTPATIENT)
Dept: NEUROLOGY | Facility: CLINIC | Age: 57
End: 2022-05-16

## 2022-05-16 NOTE — TELEPHONE ENCOUNTER
Patient was on the bump list for RUBA Reddy.  Due to the next available on 7/8 we rescheduled her on DARIA Swartz's schedule earlier on 5/20.  I e mailed the patient her appt with the address.

## 2022-05-20 ENCOUNTER — TELEPHONE (OUTPATIENT)
Dept: NEUROLOGY | Facility: CLINIC | Age: 57
End: 2022-05-20

## 2022-05-20 ENCOUNTER — OFFICE VISIT (OUTPATIENT)
Dept: NEUROLOGY | Facility: CLINIC | Age: 57
End: 2022-05-20

## 2022-05-20 VITALS
HEIGHT: 63 IN | HEART RATE: 75 BPM | WEIGHT: 210 LBS | DIASTOLIC BLOOD PRESSURE: 74 MMHG | SYSTOLIC BLOOD PRESSURE: 128 MMHG | BODY MASS INDEX: 37.21 KG/M2 | OXYGEN SATURATION: 98 %

## 2022-05-20 DIAGNOSIS — R51.9 DAILY HEADACHE: ICD-10-CM

## 2022-05-20 DIAGNOSIS — G08 ACUTE CEREBRAL VENOUS SINUS THROMBOSIS: Primary | ICD-10-CM

## 2022-05-20 PROBLEM — U07.1 COVID: Status: ACTIVE | Noted: 2022-02-16

## 2022-05-20 PROCEDURE — 99215 OFFICE O/P EST HI 40 MIN: CPT | Performed by: NURSE PRACTITIONER

## 2022-05-20 RX ORDER — GABAPENTIN 300 MG/1
CAPSULE ORAL
Qty: 60 CAPSULE | Refills: 5 | Status: SHIPPED | OUTPATIENT
Start: 2022-05-20 | End: 2022-08-19 | Stop reason: SDUPTHER

## 2022-05-20 NOTE — PROGRESS NOTES
Subjective:     Patient ID: Naima Paul is a 57 y.o. female.    CC:   Chief Complaint   Patient presents with   • venous sinus thrombosis     [facility] follow up       HPI:   History of Present Illness   This is a 57-year-old female who presents for [facility] follow-up.    She was admitted to [facility] from 03/30/2022 until 04/03/2022 for symptoms of right (laterality clarified by patient) arm weakness, paresthesias, and syncope. She had been diagnosed with COVID-19 on 02/15/2022, and her symptoms had improved significantly. She developed acute episodes of numbness that waxed, and waned, and had some transient vision loss, and syncope. At home, her  found her unresponsive on the floor. She was admitted, and underwent neuroimaging with MRI of the brain without contrast showing no acute stroke or acute intracranial abnormalities. MRA venogram completed on 03/31/2022 showed right transverse, and right sigmoid sinus, venous sinus thrombosis. She also underwent a CT cerebral perfusion with and without contrast, which showed no ischemia. She underwent a CTA of the neck which showed no significant abnormalities, and CTA of the head showed no significant abnormalities. She was started on heparin, and was treated with gabapentin for some headaches she was experiencing. She was discharged home on Eliquis, and the plan is for her today to follow up in clinic and repeat MRA venogram to ensure venous sinus thrombosis resolution.    During her hospitalization, she also underwent echocardiogram with agitated saline, and this showed a normal ejection fraction of 61 to 65 percent with negative bubble study. Inpatient neurology consultation was completed by Dr. Carbajal, neurology, and her recommendations included the Eliquis 5 mg twice daily for a minimum of 3 months, continuing gabapentin 300 mg 3 times daily for her headaches, and avoiding over-the-counter  "medications for her headaches to avoid overuse. She also underwent an EEG at the time, and this was normal.    The patient states that 1 month before being admitted to the hospital she had contracted COVID-19.     She states she has not experienced headaches for the last 2 weeks. She has completed the prescription of gabapentin 300 mg. Her headaches occurred more at night,and when she would \"rotate\" her body at night, she would feel pressure and headaches.     She went to an ophthalmologist on Wednesday, 05/18/2022, Dr. Ayden Wallace in [place]. He informed her that he \"could still see the blood clot behind her right eye\". She adds that there is something going on with the left eye that \"he thinks is tied to the blood clot also.\" She has to follow up with him in 1 month. She adds he does not see any permanent damage, and thinks once the blood clots are gone, it will all resolve. She still experiences vision problems, and some days has pressure around her right orbital area; gabapentin was helpful for this and she was only taking it at night since it made her drowsy. She adds that some days she does not experience guadalupe dizziness, but \"feels off.\" She takes it easy on these days.     She denies any unusual bruising or bleeding. She denies melena, dark stools, or coughing up blood.    The patient denies previous thrombus, DVT or PE. She denies family history of bleeding or clotting disorders.     She denies any numbness or tingling, but some days \"I'm a little off, brain-wise.\" She reports that on the days she feels this way she experiences \"lightheadedness or not thinking right, so some days I stay in bed or take it easy.\" She adds that she feels this way when she is \"run down\" or has done too much the day before.    She has anaphylactic reaction to Motrin. She states she has never had any trouble taking aspirin.    The following portions of the patient's history were reviewed and updated as appropriate: " "allergies, current medications, past family history, past medical history, past social history, past surgical history and problem list.    Past Medical History:   Diagnosis Date   • Anxiety    • Depression    • Hypertension        Past Surgical History:   Procedure Laterality Date   • ADENOIDECTOMY     • CHOLECYSTECTOMY     • DIAGNOSTIC LAPAROSCOPY     • ENDOMETRIAL ABLATION     • TONSILLECTOMY         Social History     Socioeconomic History   • Marital status:    Tobacco Use   • Smoking status: Never Smoker   • Smokeless tobacco: Never Used   Vaping Use   • Vaping Use: Never used   Substance and Sexual Activity   • Alcohol use: Never   • Drug use: Never   • Sexual activity: Defer       Family History   Problem Relation Age of Onset   • Cancer Mother    • Seizures Mother    • Cancer Father           Current Outpatient Medications:   •  apixaban (ELIQUIS) 5 MG tablet tablet, Take 1 tablet by mouth Every 12 (Twelve) Hours. Indications: Other - full anticoagulation, Disp: 60 tablet, Rfl: 1  •  buPROPion SR (WELLBUTRIN SR) 150 MG 12 hr tablet, Take 150 mg by mouth Daily., Disp: , Rfl:   •  Cholecalciferol (VITAMIN D3) 50 MCG (2000 UT) capsule, Take 2,000 Units by mouth Daily., Disp: , Rfl:   •  gabapentin (NEURONTIN) 300 MG capsule, Take 1 capsules every night and then 1 additional capsule during the day if needed, Disp: 60 capsule, Rfl: 5  •  loratadine (CLARITIN) 10 MG tablet, Take 10 mg by mouth Daily., Disp: , Rfl:   •  metoprolol succinate XL (TOPROL-XL) 50 MG 24 hr tablet, metoprolol succinate ER 50 mg tablet,extended release 24 hr  TAKE 1 TABLET BY MOUTH ONCE DAILY \"PT MUST BE SEEN FOR ADDITIONAL REFILS\", Disp: , Rfl:   •  montelukast (SINGULAIR) 10 MG tablet, Take 10 mg by mouth Every Night., Disp: , Rfl:   •  venlafaxine XR (EFFEXOR-XR) 150 MG 24 hr capsule, Take 150 mg by mouth Daily., Disp: , Rfl:      Review of Systems   Constitutional: Negative for chills, fatigue, fever and unexpected weight " "change.   HENT: Negative for ear pain, hearing loss, nosebleeds, rhinorrhea and sore throat.    Eyes: Positive for photophobia and visual disturbance. Negative for pain, discharge and itching.   Respiratory: Negative for cough, chest tightness, shortness of breath and wheezing.    Cardiovascular: Negative for chest pain, palpitations and leg swelling.   Gastrointestinal: Negative for abdominal pain, blood in stool, constipation, diarrhea, nausea and vomiting.   Genitourinary: Negative for dysuria, frequency, hematuria and urgency.   Musculoskeletal: Negative for arthralgias, back pain, gait problem, joint swelling, myalgias, neck pain and neck stiffness.   Skin: Negative for rash and wound.   Allergic/Immunologic: Negative for environmental allergies and food allergies.   Neurological: Positive for light-headedness (slight), numbness (and pressure over right eye/orbit) and headaches. Negative for dizziness, tremors, seizures, syncope, speech difficulty and weakness.   Hematological: Negative for adenopathy. Does not bruise/bleed easily.   Psychiatric/Behavioral: Positive for decreased concentration. Negative for agitation, confusion, hallucinations, sleep disturbance and suicidal ideas. The patient is not nervous/anxious.    All other systems reviewed and are negative.       Objective:  /74   Pulse 75   Ht 160 cm (63\")   Wt 95.3 kg (210 lb)   SpO2 98%   BMI 37.20 kg/m²     Neurologic Exam     Mental Status   Oriented to person, place, and time.   Attention: normal. Concentration: normal.   Speech: speech is normal   Level of consciousness: alert  Knowledge: good.     Cranial Nerves     CN II   Visual acuity: decreased    CN III, IV, VI   Pupils are equal, round, and reactive to light.  Extraocular motions are normal.     CN V   Facial sensation intact.     CN VII   Facial expression full, symmetric.     CN VIII   CN VIII normal.     CN IX, X   CN IX normal.   CN X normal.     CN XI   CN XI normal.     CN " XII   CN XII normal.     Motor Exam   Muscle bulk: normal  Overall muscle tone: normal    Strength   Strength 5/5 throughout.     Sensory Exam   Light touch normal.   Vibration normal.   Proprioception normal.   Pinprick normal.     Gait, Coordination, and Reflexes     Gait  Gait: normal    Coordination   Romberg: negative  Finger to nose coordination: normal  Heel to shin coordination: normal  Tandem walking coordination: normal    Tremor   Resting tremor: absent  Intention tremor: absent  Action tremor: absent    Reflexes   Reflexes 2+ except as noted.   Right : 2+  Left : 2+  Right plantar: normal  Left plantar: normal  Right ankle clonus: absent  Left ankle clonus: absent      Physical Exam  Constitutional:       Appearance: Normal appearance.   Eyes:      Extraocular Movements: EOM normal.      Pupils: Pupils are equal, round, and reactive to light.      Comments:   Some decreased peripheral vision very subtle, following with opthalmology   Cardiovascular:      Rate and Rhythm: Normal rate and regular rhythm.      Heart sounds: Normal heart sounds, S1 normal and S2 normal.   Pulmonary:      Effort: Pulmonary effort is normal.      Breath sounds: Normal breath sounds.   Neurological:      Mental Status: She is alert and oriented to person, place, and time.      Coordination: Finger-Nose-Finger Test, Heel to Shin Test and Romberg Test normal.      Gait: Gait is intact. Tandem walk normal.      Deep Tendon Reflexes: Strength normal.   Psychiatric:         Mood and Affect: Mood and affect normal.         Speech: Speech normal.         Behavior: Behavior normal.         Thought Content: Thought content normal.         Cognition and Memory: Cognition and memory normal.         Judgment: Judgment normal.     RESULTS:  MRI of the brain without contrast showing no acute stroke or acute intracranial abnormalities. 3/30/2022    MRA venogram completed on 03/31/2022 showed right transverse, and right sigmoid sinus,  venous sinus thrombosis. Reviewed imaging today in clinic with patient and family.    CT cerebral perfusion with, and without contrast, which showed no ischemia. 3/30/2022    CTA of the neck showed no significant abnormalities. 3/30/2022    CTA of the head showed no significant abnormalities.  3/30/2022    Echocardiogram with agitated saline showed a normal ejection fraction of 61 to 65 percent with negative bubble study. 3/31/22    She also underwent an EEG at the time, and this was normal. 3/31/22    During hospital stay, HbA1c was 5.9 percent. Lipid panel was normal with total cholesterol 151, triglycerides 111, HDL 32, and LDL 99. TSH was also normal. CBC with differential was essentially normal with the exception of hemoglobin slightly low at 11.7.      Assessment/Plan:       Diagnoses and all orders for this visit:    1. Acute cerebral venous sinus thrombosis (Primary)  -     gabapentin (NEURONTIN) 300 MG capsule; Take 1 capsules every night and then 1 additional capsule during the day if needed  Dispense: 60 capsule; Refill: 5  -     apixaban (ELIQUIS) 5 MG tablet tablet; Take 1 tablet by mouth Every 12 (Twelve) Hours. Indications: Other - full anticoagulation  Dispense: 60 tablet; Refill: 1  -     MRI Angiogram Venogram Head; Future    2. Daily headache  Comments:  gabapentin    - I reviewed all of her records from her hospitalization. We discussed the results of the imaging. She was reassured that there was no stroke on her MRI or other imaging, but a thrombus was present. She was recommended to remain on Eliquis 5 mg twice a day for 3 full months, and obtain repeat imaging to make sure that the thrombus is no longer present. Refill for Eliquis sent.  - We will repeat the MR venogram at the 3-month romeo to make sure it is no longer present. We will order it now, and have it scheduled around the last week of 06/2022. Once we receive the MR venogram results, based on the results, we will decide if we need to  keep her on the Eliquis longer or if we need to discontinue the Eliquis. We will discuss with collaboriting neurologist whether she will need aspirin long-term once we discontinue the Eliquis or no anti-platelet management & discuss following MRV results.  - Restart gabapentin 300 mg, 1 capsule every night for headaches, and eye pressure, and then 1 additional capsule during the day if needed.  - The patient contracted COVID-19 approximately 1 month prior to developing a thrombus. Neurology felt the thrombus could have been attributed to sammy COVID-19.  - Copies of MR venogram results will be sent to her ophthalmologist, Dr. Wallace, along with my office note from today. We have requested the patient ask Dr. Wallace to send the results of her repeat ophthalmology exam to our office.   - She was advised to stay well hydrated.  - All questions were answered.  -She will follow up in our clinic in 3 months. She will continue the Eliquis until we call her with additional recommendations based on repeat MR venogram. She verbalizes understanding, and agrees with plan.  -Restart gabapentin 300mg at bedtime for daily headache/orbital pain and may take a 2nd dose during the day if she has a flare up in pain.       Reviewed medications, potential side effects and signs and symptoms to report. Discussed risk versus benefits of treatment plan with patient and/or family-including medications, labs and radiology that may be ordered. Addressed questions and concerns during visit. Patient and/or family verbalized understanding and agree with plan.    AS THE PROVIDER, I PERSONALLY WORE PPE DURING ENTIRE FACE TO FACE ENCOUNTER IN CLINIC WITH THE PATIENT. PATIENT ALSO WORE PPE DURING ENTIRE FACE TO FACE ENCOUNTER EXCEPT FOR A MAX OF 30 SECONDS DURING NEUROLOGICAL EVALUATION OF CRANIAL NERVES AND THEN MASK WAS PLACED BACK OVER PATIENT FACE FOR REMAINDER OF VISIT. I WASHED MY HANDS BEFORE AND AFTER VISIT.     As part of this  patient's treatment plan I am prescribing controlled substances. The patient has been made aware of appropriate use of such medications, including potential risk of somnolence, limited ability to drive and/or work safely, and potential for dependence or overdose. It has also been made clear that these medications are for use by the patient only, without concomitant use of alcohol or other substances unless prescribed. Keep out of reach of children.  Jonah report has been reviewed. If this is going to be prescribed long term, [facility] Controlled Substance Agreement Contract has also been read and signed by patient and myself.    Total time of visit today was 45 minutes including reviewing hospital records, imaging, obtaining additional history from patient, completing exam, discussing plan of care and recommendations moving forward.    During this visit the following were done:  Labs Reviewed [x]    Labs Ordered []    Radiology Reports Reviewed [x]    Radiology Ordered [x]    PCP Records Reviewed []    Referring Provider Records Reviewed []    ER Records Reviewed [x]    Hospital Records Reviewed [x]    History Obtained From Family []    Radiology Images Reviewed [x]    Other Reviewed []    Records Requested []      Transcribed from ambient dictation for BERE Elmore by Arabella Obregon.  05/20/22   10:03 EDT    Patient verbalized consent to the visit recording.  I have personally performed the services described in this document as transcribed by the above individual, and it is both accurate and complete.  BERE Elmore  5/20/2022  12:22 EDT

## 2022-05-20 NOTE — TELEPHONE ENCOUNTER
----- Message from BERE Elmore sent at 5/20/2022 12:23 PM EDT -----  Please fax today's visit note to dr. Ayden monge ophthalmology in [facility]

## 2022-07-06 ENCOUNTER — HOSPITAL ENCOUNTER (OUTPATIENT)
Dept: MRI IMAGING | Facility: HOSPITAL | Age: 57
Discharge: HOME | End: 2022-07-06
Admitting: NURSE PRACTITIONER

## 2022-07-06 DIAGNOSIS — G08 ACUTE CEREBRAL VENOUS SINUS THROMBOSIS: ICD-10-CM

## 2022-07-06 PROCEDURE — 70544 MR ANGIOGRAPHY HEAD W/O DYE: CPT

## 2022-07-07 DIAGNOSIS — G08 ACUTE CEREBRAL VENOUS SINUS THROMBOSIS: Primary | ICD-10-CM

## 2022-07-07 NOTE — PROGRESS NOTES
These notify the patient that she continues to have the venous sinus thrombosis on MRA venogram of the head.  She should continue her current medications.  I would also like to consult with neurosurgery for additional options.  I am going to place a referral to them.  I will refill her Eliquis for now.  We will follow-up as scheduled in clinic.  Thanks, BERE Beckman.    These fax a copy of report to PCP.

## 2022-07-08 ENCOUNTER — TELEPHONE (OUTPATIENT)
Dept: NEUROLOGY | Facility: CLINIC | Age: 57
End: 2022-07-08

## 2022-07-08 NOTE — TELEPHONE ENCOUNTER
Caller: Naima Paul    Relationship: Self    Best call back number: [phone]    What was the call regarding: PT RETURNING PHONE CALL TO ALFREDO    Call warm-transferred to: ALFREDO    DOCUMENTING PER [facility] PROTOCOL.

## 2022-07-08 NOTE — TELEPHONE ENCOUNTER
Pt is aware of the results and recommendation to see Neurosurgery and would like the referral please.  Faxed to pcp as well.

## 2022-07-08 NOTE — TELEPHONE ENCOUNTER
----- Message from BERE Elmore sent at 7/7/2022 12:27 PM EDT -----  These notify the patient that she continues to have the venous sinus thrombosis on MRA venogram of the head.  She should continue her current medications.  I would also like to consult with neurosurgery for additional options.  I am going to place a referral to them.  I will refill her Eliquis for now.  We will follow-up as scheduled in clinic.  Thanks, BERE Beckman.    These fax a copy of report to PCP.

## 2022-07-25 ENCOUNTER — OFFICE VISIT (OUTPATIENT)
Dept: NEUROSURGERY | Facility: CLINIC | Age: 57
End: 2022-07-25

## 2022-07-25 VITALS
SYSTOLIC BLOOD PRESSURE: 120 MMHG | TEMPERATURE: 97.3 F | HEART RATE: 82 BPM | BODY MASS INDEX: 36.86 KG/M2 | DIASTOLIC BLOOD PRESSURE: 80 MMHG | HEIGHT: 63 IN | WEIGHT: 208 LBS

## 2022-07-25 DIAGNOSIS — G08 DURAL VENOUS SINUS THROMBOSIS: Primary | ICD-10-CM

## 2022-07-25 PROCEDURE — 99204 OFFICE O/P NEW MOD 45 MIN: CPT | Performed by: RADIOLOGY

## 2022-07-25 RX ORDER — BUPROPION HYDROCHLORIDE 300 MG/1
TABLET ORAL
COMMUNITY
Start: 2022-07-18

## 2022-07-25 NOTE — PROGRESS NOTES
NAME: VICTOR MANUEL ARRINGTON   DOS: 2022  : 1965  PCP: Rubi Jackson PA    Chief Complaint:    Chief Complaint   Patient presents with   • f/u dural venous sinus thrombosis       History of Present Illness:  57 y.o. female who was hospitalized at [facility] in late 2022 with headache and superior sagittal sinus thrombosis, deemed likely related to her recent COVID infection.  She was started on anticoagulation therapy (Eliquis), and has been followed up by neurology and ophthalmology since that time.  Her headaches have improved and are currently manageable, and similarly her visual acuity has improved.  She has had recent MR venogram demonstrating persistent occlusion of the superior sagittal sinus, and presents today for consultation regarding possible neurosurgery/neuro intervention.    Past Medical History:  Past Medical History:   Diagnosis Date   • Anemia    • Anxiety    • Depression    • Diabetes (HCC)    • Headache, migraine    • Hypertension        Past Surgical History:  Past Surgical History:   Procedure Laterality Date   • ADENOIDECTOMY     • CHOLECYSTECTOMY     • DIAGNOSTIC LAPAROSCOPY     • ENDOMETRIAL ABLATION     • TONSILLECTOMY         Review of Systems:        Review of Systems   Constitutional: Positive for fever. Negative for activity change, appetite change, chills, diaphoresis, fatigue and unexpected weight change.   HENT: Negative for congestion, dental problem, drooling, ear discharge, ear pain, facial swelling, hearing loss, mouth sores, nosebleeds, postnasal drip, rhinorrhea, sinus pressure, sinus pain, sneezing, sore throat, tinnitus, trouble swallowing and voice change.    Eyes: Positive for itching and visual disturbance. Negative for photophobia, pain, discharge and redness.   Respiratory: Negative for apnea, cough, choking, chest tightness, shortness of breath, wheezing and stridor.    Cardiovascular: Negative for chest pain, palpitations and leg swelling.    Gastrointestinal: Negative for abdominal distention, abdominal pain, anal bleeding, blood in stool, constipation, diarrhea, nausea, rectal pain and vomiting.   Endocrine: Negative for cold intolerance, heat intolerance, polydipsia, polyphagia and polyuria.   Genitourinary: Negative for decreased urine volume, difficulty urinating, dysuria, enuresis, flank pain, frequency, genital sores, hematuria and urgency.   Musculoskeletal: Negative for arthralgias, back pain, gait problem, joint swelling, myalgias, neck pain and neck stiffness.   Skin: Negative for color change, pallor, rash and wound.   Allergic/Immunologic: Negative for environmental allergies, food allergies and immunocompromised state.   Neurological: Positive for light-headedness and headaches. Negative for dizziness, tremors, seizures, syncope, facial asymmetry, speech difficulty, weakness and numbness.   Hematological: Negative for adenopathy. Bruises/bleeds easily.   Psychiatric/Behavioral: Positive for sleep disturbance. Negative for agitation, behavioral problems, confusion, decreased concentration, dysphoric mood, hallucinations, self-injury and suicidal ideas. The patient is nervous/anxious. The patient is not hyperactive.         Medications    Current Outpatient Medications:   •  apixaban (ELIQUIS) 5 MG tablet tablet, Take 1 tablet by mouth Every 12 (Twelve) Hours. Indications: Other - full anticoagulation, Disp: 60 tablet, Rfl: 3  •  buPROPion XL (WELLBUTRIN XL) 300 MG 24 hr tablet, , Disp: , Rfl:   •  Cholecalciferol (VITAMIN D3) 50 MCG (2000 UT) capsule, Take 2,000 Units by mouth Daily., Disp: , Rfl:   •  gabapentin (NEURONTIN) 300 MG capsule, Take 1 capsules every night and then 1 additional capsule during the day if needed, Disp: 60 capsule, Rfl: 5  •  loratadine (CLARITIN) 10 MG tablet, Take 10 mg by mouth Daily., Disp: , Rfl:   •  metoprolol succinate XL (TOPROL-XL) 50 MG 24 hr tablet, metoprolol succinate ER 50 mg tablet,extended release  "24 hr  TAKE 1 TABLET BY MOUTH ONCE DAILY \"PT MUST BE SEEN FOR ADDITIONAL REFILS\", Disp: , Rfl:   •  montelukast (SINGULAIR) 10 MG tablet, Take 10 mg by mouth Every Night., Disp: , Rfl:   •  venlafaxine XR (EFFEXOR-XR) 150 MG 24 hr capsule, Take 150 mg by mouth Daily., Disp: , Rfl:     Allergies:  Allergies   Allergen Reactions   • Ibuprofen Swelling and Rash   • Penicillins Itching       Social Hx:  Social History     Tobacco Use   • Smoking status: Never Smoker   • Smokeless tobacco: Never Used   Vaping Use   • Vaping Use: Never used   Substance Use Topics   • Alcohol use: Yes     Comment: occ 2 per week   • Drug use: Never       Family Hx:  Family History   Problem Relation Age of Onset   • Hypertension Mother    • Cancer Mother    • Seizures Mother    • Drug abuse Father    • Hypertension Father    • Cancer Father        Review of Imaging:  MR venogram of the head dated 7/6/2022 was reviewed along with its corresponding radiologic report.  Comparison is made to prior study from 3/31/2022.  There has been partial recanalization of the distal one third of the superior sagittal sinus which drains to the left transverse sinus.  The anterior half of the superior sagittal sinus, as well as the right transverse/sigmoid sinus remain occluded.  There is collateral venous drainage via retrograde cortical veins a drain into the middle sylvian veins, bilaterally.      Physical Examination:  Vitals:    07/25/22 1408   BP: 120/80   Pulse: 82   Temp: 97.3 °F (36.3 °C)        General Appearance:   Well developed, well nourished, well groomed, alert, and cooperative.  Cardiovascular: Regular rate and rhythm. No carotid bruits      Neurological examination:  Neurologic Exam     Mental Status   Oriented to person, place, and time.   Speech: speech is normal   Level of consciousness: alert    Cranial Nerves   Cranial nerves II through XII intact.     Motor Exam     Strength   Strength 5/5 throughout.     Sensory Exam   Light touch " "normal.     Gait, Coordination, and Reflexes     Gait  Gait: normal      Diagnoses/Plan:    Ms. Paul is a 57 y.o. female with a thrombosis of the superior sagittal sinus and right transverse/sigmoid sinus in late March 2022, likely attributable to hypercoagulability and recent COVID infection.  She has been on anticoagulation therapy since that time, and most recent MR venogram does demonstrate some improvement with recanalization of the distal one third of the superior sagittal sinus.  There is persistent occlusion of the least anterior half of the superior sagittal sinus and the right transverse/sigmoid sinus.  There is collateral flow via a retrograde cortical venous drainage to the bilateral middle sylvian veins.      Ms. Paul's headaches have improved since her hospitalization, and her most recent ophthalmologic evaluation (Dr. Ayden Wallace) describes no papilledema and improved visual acuity.  I discussed these findings in detail with Ms. Paul and family, in particular that given her clinical improvement with anticoagulation, there is no role for neuro intervention or mechanical thrombectomy (clot is likely very mature).  Additionally, it is unlikely that her superior sagittal sinus will ever completely recanalize (again clot is likely very mature), and her most recent MR venogram is likely her new \"baseline\" with collateral venous drainage.  Ms. Paul and family were relieved to hear that she did not need any sort of \"neurosurgery\", and they will continue to follow-up with neurology for management of her headaches and for medical management of her dural venous sinus thrombosis.                  "

## 2022-07-26 ENCOUNTER — PATIENT ROUNDING (BHMG ONLY) (OUTPATIENT)
Dept: NEUROSURGERY | Facility: CLINIC | Age: 57
End: 2022-07-26

## 2022-07-26 NOTE — PROGRESS NOTES
A MY-CHART MESSAGE HAS BEEN SENT TO THE PATIENT FOR PATIENT ROUNDING WITH [facility].

## 2022-08-19 ENCOUNTER — LAB (OUTPATIENT)
Dept: LAB | Facility: HOSPITAL | Age: 57
End: 2022-08-19

## 2022-08-19 ENCOUNTER — OFFICE VISIT (OUTPATIENT)
Dept: NEUROLOGY | Facility: CLINIC | Age: 57
End: 2022-08-19

## 2022-08-19 VITALS
DIASTOLIC BLOOD PRESSURE: 72 MMHG | HEART RATE: 66 BPM | OXYGEN SATURATION: 96 % | WEIGHT: 207 LBS | BODY MASS INDEX: 36.68 KG/M2 | SYSTOLIC BLOOD PRESSURE: 132 MMHG | HEIGHT: 63 IN

## 2022-08-19 DIAGNOSIS — G08 CEREBRAL VENOUS SINUS THROMBOSIS, CHRONIC: Primary | ICD-10-CM

## 2022-08-19 DIAGNOSIS — R41.841 COGNITIVE COMMUNICATION DISORDER: ICD-10-CM

## 2022-08-19 DIAGNOSIS — G44.89 OTHER HEADACHE SYNDROME: ICD-10-CM

## 2022-08-19 PROBLEM — R73.9 HYPERGLYCEMIA: Status: ACTIVE | Noted: 2022-08-07

## 2022-08-19 PROBLEM — F41.8 MIXED ANXIETY AND DEPRESSIVE DISORDER: Status: ACTIVE | Noted: 2022-08-07

## 2022-08-19 PROBLEM — G47.33 OBSTRUCTIVE SLEEP APNEA SYNDROME: Status: ACTIVE | Noted: 2022-08-07

## 2022-08-19 PROBLEM — I82.90 VENOUS THROMBOSIS: Status: ACTIVE | Noted: 2022-08-08

## 2022-08-19 PROBLEM — E78.5 HYPERLIPIDEMIA: Status: ACTIVE | Noted: 2022-08-07

## 2022-08-19 PROCEDURE — 82746 ASSAY OF FOLIC ACID SERUM: CPT

## 2022-08-19 PROCEDURE — 82607 VITAMIN B-12: CPT

## 2022-08-19 PROCEDURE — 99214 OFFICE O/P EST MOD 30 MIN: CPT | Performed by: NURSE PRACTITIONER

## 2022-08-19 PROCEDURE — 83921 ORGANIC ACID SINGLE QUANT: CPT

## 2022-08-19 PROCEDURE — 36415 COLL VENOUS BLD VENIPUNCTURE: CPT

## 2022-08-19 RX ORDER — ATORVASTATIN CALCIUM 20 MG/1
TABLET, FILM COATED ORAL
COMMUNITY
Start: 2022-08-09 | End: 2023-02-01

## 2022-08-19 RX ORDER — VENLAFAXINE HYDROCHLORIDE 75 MG/1
CAPSULE, EXTENDED RELEASE ORAL
COMMUNITY
Start: 2022-08-08 | End: 2023-02-01

## 2022-08-19 RX ORDER — BUSPIRONE HYDROCHLORIDE 7.5 MG/1
TABLET ORAL
COMMUNITY
Start: 2022-08-09

## 2022-08-19 RX ORDER — GABAPENTIN 300 MG/1
CAPSULE ORAL
Qty: 60 CAPSULE | Refills: 5 | Status: SHIPPED | OUTPATIENT
Start: 2022-08-19 | End: 2023-03-06

## 2022-08-19 NOTE — PROGRESS NOTES
"Subjective:     Patient ID: Naima Paul is a 57 y.o. female.    CC:   Chief Complaint   Patient presents with   • Headache       HPI:   History of Present Illness   Today 8/19/2022-This is a 57-year-old female who presents for 3-month neurology follow-up on cerebral venous sinus thrombosis along with daily headaches. Since last visit, we did refer her to neurosurgery for further evaluation and recommendations on treatments. She was evaluated by Dr. Davion De Los Santos with [facility] on 07/25/2022. He reviewed her MRA venogram completed on 07/06/2022, and this did show persistent loss of signal intensity within the right transverse sinus and right sigmoid sinus consistent with persistent venous sinus thrombosis. This was completed on 07/06/2022 and compared to 03/31/2022. He felt that the thrombosis of the superior sagittal sinus and right transverse sigmoid sinus were likely attributable to hypercoagulability and recent COVID-19 infection. He recommended that she continue Eliquis. He also noted that this is likely her new baseline. She does not require any surgical intervention. Her headaches have improved, and ophthalmology evaluation has not revealed any papilledema. He recommended continuing with medical management. We have been treating her with gabapentin 300 mg daily for headache prevention, and she is on Eliquis 5 mg twice per day.    Today, the patient reports that her headaches have improved since her last visit. She continues to take Eliquis 5 mg twice daily. She continues to have intermittent pressure on the left side of the head, as well as tenderness to palpation to the left eye over the left eyelid over the past couple of days. She continues to have blurry vision, but this is not as severe as it was previously. She no longer sees \"the gray splotchiness.\"     The patient continues to take gabapentin 3 to 4 times per week. She has been waking up with a headache a couple of times since her last visit. " If she wakes up with a headache, she will take gabapentin during the day which has only been about 5 times since her last visit. She denies any nausea, vomiting, or sound sensitivity with the headache. She does have constant photophobia, which has been ongoing since the blood clots. Photophobia is aggravated by flashing lights. She will get a headache if she sees flashing lights. The headache feels like pressure. She denies any throbbing or stabbing pain. She denies any unusual bruising or bleeding. The patient denies any dizziness, but she does have occasional balance issues.    The patient reports that she had an episode of syncope on 03/30/2022 just prior to her hospital admission. She collapsed in her kitchen for 45 minutes, and she was turning blue, her eyes rolled back in her head, and she was gurgling when her family found her. She did not require CPR. She had an EEG, which was normal. She continues to have difficulty finding words. She wonders if this is expected with her hospital stay as well as prior COVID.    She has a history of sammy COVID-19 prior to her hospitalization in March 2022.    The patient's highest level of education is some college. She is retired. 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    She does not think that she has had her vitamin B12 checked.    Prior extensive neurological workup & history:  She was admitted to [facility] from 03/30/2022 until 04/03/2022 for symptoms of right (laterality clarified by patient) arm weakness, paresthesias, and syncope. She had been diagnosed with COVID-19 on 02/15/2022, and her symptoms had improved significantly. She developed acute episodes of numbness that waxed, and waned, and had some transient vision loss, and syncope. At home, her  found her unresponsive on the floor. She was admitted, and underwent neuroimaging with MRI of the brain without contrast showing no acute stroke or acute intracranial  abnormalities. MRA venogram completed on 03/31/2022 showed right transverse, and right sigmoid sinus, venous sinus thrombosis. She also underwent a CT cerebral perfusion with and without contrast, which showed no ischemia. She underwent a CTA of the neck which showed no significant abnormalities, and CTA of the head showed no significant abnormalities. She was started on heparin, and was treated with gabapentin for some headaches she was experiencing. She was discharged home on Eliquis.     During her hospitalization, she also underwent echocardiogram with agitated saline, and this showed a normal ejection fraction of 61 to 65 percent with negative bubble study. Inpatient neurology consultation was completed by Dr. Carbajal, neurology, and her recommendations included the Eliquis 5 mg twice daily for a minimum of 3 months, continuing gabapentin 300 mg 3 times daily for her headaches, and avoiding over-the-counter medications for her headaches to avoid overuse. She also underwent an EEG at the time, and this was normal.     The patient states that 1 month before being admitted to the hospital she had contracted COVID-19.      The patient denies previous thrombus, DVT or PE. She denies family history of bleeding or clotting disorders.     She has anaphylactic reaction to Motrin. She states she has never had any trouble taking aspirin.    The following portions of the patient's history were reviewed and updated as appropriate: allergies, current medications, past family history, past medical history, past social history, past surgical history and problem list.    Past Medical History:   Diagnosis Date   • Anemia    • Anxiety    • Depression    • Diabetes (HCC)    • Headache, migraine    • Hypertension        Past Surgical History:   Procedure Laterality Date   • ADENOIDECTOMY     • CHOLECYSTECTOMY     • DIAGNOSTIC LAPAROSCOPY     • ENDOMETRIAL ABLATION     • TONSILLECTOMY         Social History     Socioeconomic  "History   • Marital status:    Tobacco Use   • Smoking status: Never Smoker   • Smokeless tobacco: Never Used   Vaping Use   • Vaping Use: Never used   Substance and Sexual Activity   • Alcohol use: Yes     Comment: occ 2 per week   • Drug use: Never   • Sexual activity: Defer       Family History   Problem Relation Age of Onset   • Hypertension Mother    • Cancer Mother    • Seizures Mother    • Drug abuse Father    • Hypertension Father    • Cancer Father           Current Outpatient Medications:   •  apixaban (ELIQUIS) 5 MG tablet tablet, Take 1 tablet by mouth Every 12 (Twelve) Hours. Indications: Other - full anticoagulation, Disp: 180 tablet, Rfl: 3  •  atorvastatin (LIPITOR) 20 MG tablet, , Disp: , Rfl:   •  buPROPion XL (WELLBUTRIN XL) 300 MG 24 hr tablet, , Disp: , Rfl:   •  busPIRone (BUSPAR) 7.5 MG tablet, , Disp: , Rfl:   •  Cholecalciferol (VITAMIN D3) 50 MCG (2000 UT) capsule, Take 2,000 Units by mouth Daily., Disp: , Rfl:   •  gabapentin (NEURONTIN) 300 MG capsule, Take 1 capsules every night and then 1 additional capsule during the day if needed, Disp: 60 capsule, Rfl: 5  •  loratadine (CLARITIN) 10 MG tablet, Take 10 mg by mouth Daily., Disp: , Rfl:   •  metoprolol succinate XL (TOPROL-XL) 50 MG 24 hr tablet, metoprolol succinate ER 50 mg tablet,extended release 24 hr  TAKE 1 TABLET BY MOUTH ONCE DAILY \"PT MUST BE SEEN FOR ADDITIONAL REFILS\", Disp: , Rfl:   •  montelukast (SINGULAIR) 10 MG tablet, Take 10 mg by mouth Every Night., Disp: , Rfl:   •  venlafaxine XR (EFFEXOR-XR) 75 MG 24 hr capsule, , Disp: , Rfl:      Review of Systems   Eyes: Positive for photophobia and visual disturbance.   Neurological: Positive for headaches.   All other systems reviewed and are negative.       Objective:  /72   Pulse 66   Ht 160 cm (63\")   Wt 93.9 kg (207 lb)   SpO2 96%   BMI 36.67 kg/m²     Neurologic Exam     Mental Status   Oriented to person, place, and time.   Speech: speech is normal " (Reports intermittent word finding difficulties)  Level of consciousness: alert    Cranial Nerves   Cranial nerves II through XII intact.     Motor Exam   Muscle bulk: normal  Overall muscle tone: normal    Strength   Strength 5/5 throughout.     Gait, Coordination, and Reflexes     Gait  Gait: normal    Coordination   Finger to nose coordination: normal    Tremor   Resting tremor: absent  Intention tremor: absent  Action tremor: absent    Reflexes   Reflexes 2+ except as noted.   Right : 2+  Left : 2+      Physical Exam  Constitutional:       Appearance: Normal appearance.   Eyes:     Neurological:      Mental Status: She is alert and oriented to person, place, and time.      Coordination: Finger-Nose-Finger Test normal.      Gait: Gait is intact.      Deep Tendon Reflexes: Strength normal.   Psychiatric:         Mood and Affect: Mood and affect normal.         Speech: Speech normal.         Behavior: Behavior normal.         Thought Content: Thought content normal.         Cognition and Memory: She exhibits impaired recent memory (reported).         Judgment: Judgment normal.         Assessment/Plan:       Diagnoses and all orders for this visit:    1. Cerebral venous sinus thrombosis, chronic (Primary)  -     apixaban (ELIQUIS) 5 MG tablet tablet; Take 1 tablet by mouth Every 12 (Twelve) Hours. Indications: Other - full anticoagulation  Dispense: 180 tablet; Refill: 3    2. Cognitive communication disorder  -     Vitamin B12 & Folate; Future  -     Methylmalonic Acid, Serum; Future    3. Other headache syndrome  -     gabapentin (NEURONTIN) 300 MG capsule; Take 1 capsules every night and then 1 additional capsule during the day if needed  Dispense: 60 capsule; Refill: 5  -     Vitamin B12 & Folate; Future  -     Methylmalonic Acid, Serum; Future         She is going to get her vitamin B12, folic acid, and methylmalonic acid checked today. Her thyroid level was already normal in 03/2022. Her PCP just  performed routine labs.    I handed her the [facility] guide to improving memory, focus and concentration for her to try exercises at home and then if she is not improving, we will complete referral to speech language pathology for cognitive rehab. We will complete MOCA baseline at next visit.    She will continue the gabapentin as needed with headaches significantly improved, and vision overall stable per ophthalmology. Refills sent.    She will continue the Eliquis lifelong. Refills sent.    We will follow up in 6 months or sooner if needed. She has been scheduled for Thursday, 02/16/2023 at 11:30 AM.    Reviewed medications, potential side effects and signs and symptoms to report. Discussed risk versus benefits of treatment plan with patient and/or medications, labs and radiology that may be ordered. Addressed questions and concerns during visit. Patient and/or family verbalized understanding and agree with plan.      Reviewed medications, potential side effects and signs and symptoms to report. Discussed risk versus benefits of treatment plan with patient and/or family-including medications, labs and radiology that may be ordered. Addressed questions and concerns during visit. Patient and/or family verbalized understanding and agree with plan.    AS THE PROVIDER, I PERSONALLY WORE PPE DURING ENTIRE FACE TO FACE ENCOUNTER IN CLINIC WITH THE PATIENT. PATIENT ALSO WORE PPE DURING ENTIRE FACE TO FACE ENCOUNTER EXCEPT FOR A MAX OF 30 SECONDS DURING NEUROLOGICAL EVALUATION OF CRANIAL NERVES AND THEN MASK WAS PLACED BACK OVER PATIENT FACE FOR REMAINDER OF VISIT. I WASHED MY HANDS BEFORE AND AFTER VISIT.    As part of this patient's treatment plan I am prescribing controlled substances. The patient has been made aware of appropriate use of such medications, including potential risk of somnolence, limited ability to drive and/or work safely, and potential for dependence or overdose. It has also been made clear that  these medications are for use by the patient only, without concomitant use of alcohol or other substances unless prescribed. Keep out of reach of children.  Jonah report has been reviewed. If this is going to be prescribed long term, [facility] [place] Contract has also been read and signed by patient and myself.    During this visit the following were done:  Labs Reviewed [x]    Labs Ordered [x]    Radiology Reports Reviewed [x]    Radiology Ordered []    PCP Records Reviewed []    Referring Provider Records Reviewed []    ER Records Reviewed []    Hospital Records Reviewed []    History Obtained From Family []    Radiology Images Reviewed [x]    Other Reviewed [x]  [facility]  Records Requested []      Transcribed from ambient dictation for BERE Elmore by Arabella Obregon.  08/19/22   12:26 EDT    Patient verbalized consent to the visit recording.  I have personally performed the services described in this document as transcribed by the above individual, and it is both accurate and complete.  BERE Elmore  8/19/2022  13:42 EDT

## 2022-08-20 LAB
FOLATE SERPL-MCNC: 10.5 NG/ML (ref 4.78–24.2)
VIT B12 BLD-MCNC: 539 PG/ML (ref 211–946)

## 2022-08-22 NOTE — PROGRESS NOTES
Please notify Naima her Vitamin B12 and folic acid levels look normal. Fax copy to PCP. Thanks, BERE Beckman

## 2022-08-24 LAB — METHYLMALONATE SERPL-SCNC: 176 NMOL/L (ref 0–378)

## 2023-02-01 ENCOUNTER — OFFICE VISIT (OUTPATIENT)
Dept: BEHAVIORAL HEALTH | Facility: CLINIC | Age: 58
End: 2023-02-01
Payer: COMMERCIAL

## 2023-02-01 VITALS — BODY MASS INDEX: 37.32 KG/M2 | WEIGHT: 210.6 LBS | HEIGHT: 63 IN

## 2023-02-01 DIAGNOSIS — F41.1 GENERALIZED ANXIETY DISORDER: ICD-10-CM

## 2023-02-01 DIAGNOSIS — Z51.81 ENCOUNTER FOR THERAPEUTIC DRUG MONITORING: ICD-10-CM

## 2023-02-01 DIAGNOSIS — F33.1 MAJOR DEPRESSIVE DISORDER, RECURRENT EPISODE, MODERATE: ICD-10-CM

## 2023-02-01 DIAGNOSIS — F90.2 ATTENTION DEFICIT HYPERACTIVITY DISORDER (ADHD), COMBINED TYPE, MILD: Primary | ICD-10-CM

## 2023-02-01 PROCEDURE — 90792 PSYCH DIAG EVAL W/MED SRVCS: CPT

## 2023-02-01 RX ORDER — DEXTROAMPHETAMINE SACCHARATE, AMPHETAMINE ASPARTATE, DEXTROAMPHETAMINE SULFATE AND AMPHETAMINE SULFATE 2.5; 2.5; 2.5; 2.5 MG/1; MG/1; MG/1; MG/1
10 TABLET ORAL 2 TIMES DAILY
Qty: 60 TABLET | Refills: 0 | Status: SHIPPED | OUTPATIENT
Start: 2023-02-01 | End: 2023-03-06 | Stop reason: DRUGHIGH

## 2023-02-01 NOTE — PROGRESS NOTES
New Patient Office Visit    Patient Name: Naima Paul  : 1965   MRN: [id number]   Care Team: Patient Care Team:  Rubi Jackson PA as PCP - General (Physician Assistant)  Carol Rao APRN as Nurse Practitioner (Behavioral Health)        Chief Complaint:    Chief Complaint   Patient presents with   • ADHD   • Anxiety   • Depression   • Med Management       History of Present Illness: Naima Paul is a 57 y.o. female who is here today to establish care.  Patient presents with her  to today's visit. Patient endorses a history of anxiety and depression. She states that she has dealt with depression most of her adult life and the anxiety presented in 2016 after her mother got sick. Her PCP started her on medication and she feels that is has been helpful but she feel that she could benefit from something more. She is in therapy once every other week and patient states that her therapist also feels that she can benefit from some medication adjustments. She states she has been retired for almost a year and her mental health continues to decline. Patient states she finds herself so overwhelmed at times that she just doesn't do anything. She knows there are things that need to get done and tasks that need to be completed but getting started on those is next to impossible. She states that when she does get started on a tasks, such as cooking or cleaning, she jumps around from task to task and never fully completes any of them She endorses a diagnosed reading comprehension problem that impacted her ability to commplete college.     Subjective   Review of Systems:    Review of Systems   Psychiatric/Behavioral: Positive for decreased concentration, sleep disturbance, depressed mood and stress. The patient is nervous/anxious.    All other systems reviewed and are negative.    PSYCHIATRIC HISTORY   Current Psychiatric Medications:  Wellbutrin  Lexapro  Buspar  Prior Psychiatric  "Medications:  Effexor  Currently in Counseling or Therapy:  Yes, every other week   Prior Psychiatric Outpatient Care:  PCP  Prior Psychiatric Hospitalizations:  No   Previous Suicide Attempts:  No   Previous Self-Harming Behavior:  No  History of Seizures or TBI:   No   Abuse History:  Verbal: yes  Emotional: yes  Mental: yes  Physical: yes  Sexual: no  Rape: no        Family Psychiatric History:  Alcohol use, depression,   Any family history of suicide attempts:  Cousin, Aunt        Substance Abuse History:  Alcohol: occasionally   Smoking/Cigarettes: no  Vaping: no  Smokeless Tobacco: no  Illicit Substances: no  Prescription Medication Misuse: no    Social History:  Born: [place], [place]   Raised: [place]   Currently resides in [place], [place]   Marriage status:   Children: 3  Lives with: The patient's currently household consists of the patient,  and 2 youngest childre  Highest Level of Education: Some college  Employment: Retired    History: No  Legal History, Arrests, or Incarcerations: No current legal charges pending.  Patient's Support Network Includes:   and friends      Current Medications:   Current Outpatient Medications   Medication Sig Dispense Refill   • apixaban (ELIQUIS) 5 MG tablet tablet Take 1 tablet by mouth Every 12 (Twelve) Hours. Indications: Other - full anticoagulation 180 tablet 3   • buPROPion XL (WELLBUTRIN XL) 300 MG 24 hr tablet      • busPIRone (BUSPAR) 7.5 MG tablet      • Cholecalciferol (VITAMIN D3) 50 MCG (2000 UT) capsule Take 2,000 Units by mouth Daily.     • gabapentin (NEURONTIN) 300 MG capsule Take 1 capsules every night and then 1 additional capsule during the day if needed 60 capsule 5   • loratadine (CLARITIN) 10 MG tablet Take 10 mg by mouth Daily.     • metoprolol succinate XL (TOPROL-XL) 50 MG 24 hr tablet metoprolol succinate ER 50 mg tablet,extended release 24 hr   TAKE 1 TABLET BY MOUTH ONCE DAILY \"PT MUST BE SEEN FOR ADDITIONAL REFILS\"   " "  • montelukast (SINGULAIR) 10 MG tablet Take 10 mg by mouth Every Night.     • amphetamine-dextroamphetamine (Adderall) 10 MG tablet Take 1 tablet by mouth 2 (Two) Times a Day. 60 tablet 0     No current facility-administered medications for this visit.       Mental Status Exam:   Hygiene:   good  Cooperation:  Cooperative  Eye Contact:  Good  Psychomotor Behavior:  Appropriate  Affect:  Appropriate  Mood: normal and anxious  Speech:  Normal  Thought Process:  Goal directed and Linear  Thought Content:  Normal and Mood congruent  Suicidal:  None  Homicidal:  None  Hallucinations:  None  Delusion:  None  Memory:  Intact  Orientation:  Person, Place, Time and Situation  Reliability:  good  Insight:  Good  Judgement:  Good  Impulse Control:  Good  Physical/Medical Issues:  Yes see chart     Objective   Vital Signs:   Ht 160 cm (63\")   Wt 95.5 kg (210 lb 9.6 oz)   BMI 37.31 kg/m²       Assessment / Plan    Diagnoses and all orders for this visit:    1. Attention deficit hyperactivity disorder (ADHD), combined type, mild (Primary)  -     amphetamine-dextroamphetamine (Adderall) 10 MG tablet; Take 1 tablet by mouth 2 (Two) Times a Day.  Dispense: 60 tablet; Refill: 0  -     Compliance Drug Analysis, Ur - Urine, Clean Catch    2. Generalized anxiety disorder   - Continue Wellbutrin   - Continue Lexapro   - Continue Buspar  3. Major depressive disorder, recurrent episode, moderate (HCC)   - Continue Wellbutrin   - Continue Lexapro   - Continue Buspar  4. Encounter for therapeutic drug monitoring  -     Compliance Drug Analysis, Ur - Urine, Clean Catch       Will start low dose Adderall BID. Continue all other medication as prescribed by PCP. Obtained yearly urine drug screen and controlled substance agreement signed.     A psychological evaluation was conducted in order to assess past and current level of functioning. Areas assessed included, but were not limited to: perception of social support, perception of ability to " face and deal with challenges in life (positive functioning), anxiety symptoms, depressive symptoms, perspective on beliefs/belief system, coping skills for stress, intelligence level,  Therapeutic rapport was established. Interventions conducted today were geared towards incorporating medication management along with support for continued therapy. Education was also provided as to the med management with this provider and what to expect in subsequent sessions.      We discussed risks, benefits, goals and side effects of the above medication and the patient was agreeable with the plan. Patient was educated on the importance of compliance with treatment and follow-up appointments. Patient is aware to contact the [facility] with any worsening of symptoms. To call for questions or concerns and return early if necessary. Patent is agreeable to go to the Emergency Department or call 911 should they begin SI/HI.      PHQ-2/PHQ-9: Depression Screening  Little Interest or Pleasure in Doing Things: 3-->nearly every day  Feeling Down, Depressed or Hopeless: 3-->nearly every day  PHQ-2 Total Score: 6  Trouble Falling or Staying Asleep, or Sleeping Too Much: 3-->nearly every day  Feeling Tired or Having Little Energy: 3-->nearly every day  Poor Appetite or Overeatin-->not at all  Feeling Bad about Yourself - or that You are a Failure or Have Let Yourself or Your Family Down: 3-->nearly every day  Trouble Concentrating on Things, Such as Reading the Newspaper or Watching Television: 3-->nearly every day  Moving or Speaking So Slowly that Other People Could Have Noticed? Or the Opposite - Being So Fidgety: 3-->nearly every day  Thoughts that You Would be Better Off Dead or of Hurting Yourself in Some Way: 0-->not at all  PHQ-9: Brief Depression Severity Measure Score: 21  If You Checked Off Any Problems, How Difficult Have These Problems Made It For You to Do Your Work, Take Care of Things at Home, or Get Along with Other  People?: very difficult    PHQ-9 Score:   PHQ-9 Total Score: 21    Depression Screening:  Patient screened positive for depression based on a PHQ-9 score of 21 on 2/1/2023. Follow-up recommendations include: Suicide Risk Assessment performed.      SAMIA-7 Score:  Feeling nervous, anxious or on edge: More than half the days  Not being able to stop or control worrying: Nearly every day  Worrying too much about different things: Nearly every day  Trouble Relaxing: More than half the days  Being so restless that it is hard to sit still: Several days  Feeling afraid as if something awful might happen: Not at all  Becoming easily annoyed or irritable: More than half the days  SAMIA 7 Total Score: 13  If you checked any problems, how difficult have these problems made it for you to do your work, take care of things at home, or get along with other people: Very difficult     Screening for Adults With ADHD - (1-6)  1. How often do you have trouble wrapping up the final details of a project, once the challenging parts have been done?: Sometimes  2. How often do you have difficulty getting things in order when you have to do a task that requires organization?: Often  3. How often do you have problems remembering appointments or obligations : Very Often  4. When you have a task that requires a lot of thought, how often do you avoid or delay getting started ?: Very Often  5. How often do you fidget or squirm with your hands or feet when you have to sit down for a long time?: Very Often  6. How often do you feel overly active and compelled to do things, like you were driven by a motor?: Never  7. How often do you make careless mistakes when you have to work on a boring or difficult project?: Never  8. How often do have difficulty keeping your attention when you are doing boring or repetitive work?: Often  9. How often do you have difficulty concentrating on what people say to you, even when they are speaking to you: Very  Often  10.How often do you misplace or have difficulty finding things at home or at work?: Very Often  11.How often are you distracted by activity or noise around you?: Rarely  12.How often do you leave your seat in meetings or other situations in which you are expected to remain seated?: Never  13.How often do you feel restless or fidgety?: Often  14.How often do you have difficulty unwinding and relaxing when you have time to yourself?: Often  15.How often do you find yourself talking too much when you are in social situations?: Sometimes  16.When you’re in a conversation, how often do you find yourself finishing the sentences of the people you are talking to, before they can finish them themselves?: Rarely  17.How often do you have difficulty waiting your turn in situations when turn taking is required?: Never  18.How often do you interrupt others when they are busy?: Rarely      MEDS ORDERED DURING VISIT:  New Medications Ordered This Visit   Medications   • amphetamine-dextroamphetamine (Adderall) 10 MG tablet     Sig: Take 1 tablet by mouth 2 (Two) Times a Day.     Dispense:  60 tablet     Refill:  0         Follow Up   Return in about 4 weeks (around 3/1/2023).  Patient was given instructions and counseling regarding her condition or for health maintenance advice. Please see specific information pulled into the AVS if appropriate.     TREATMENT PLAN/GOALS: Continue supportive psychotherapy efforts and medications as indicated. Treatment and medication options discussed during today's visit. Patient acknowledged and verbally consented to continue with current treatment plan and was educated on the importance of compliance with treatment and follow-up appointments.    MEDICATION ISSUES:  Discussed medication options and treatment plan of prescribed medication as well as the risks, benefits, and side effects including potential falls, possible impaired driving and metabolic adversities among others. Patient is  agreeable to call the office with any worsening of symptoms or onset of side effects. Patient is agreeable to call 911 or go to the nearest ER should he/she begin having SI/HI.      [facility] [address] [place] [address]  [phone]     February 1, 2023 12:45 EST

## 2023-02-08 LAB — DRUGS UR: NORMAL

## 2023-02-16 ENCOUNTER — OFFICE VISIT (OUTPATIENT)
Dept: NEUROLOGY | Facility: CLINIC | Age: 58
End: 2023-02-16
Payer: COMMERCIAL

## 2023-02-16 VITALS
HEART RATE: 79 BPM | OXYGEN SATURATION: 97 % | SYSTOLIC BLOOD PRESSURE: 140 MMHG | DIASTOLIC BLOOD PRESSURE: 90 MMHG | BODY MASS INDEX: 37.56 KG/M2 | WEIGHT: 212 LBS | HEIGHT: 63 IN

## 2023-02-16 DIAGNOSIS — G08 CHRONIC CEREBRAL VENOUS SINUS THROMBOSIS: ICD-10-CM

## 2023-02-16 DIAGNOSIS — R41.841 COGNITIVE COMMUNICATION DISORDER: Primary | ICD-10-CM

## 2023-02-16 DIAGNOSIS — G44.89 OTHER HEADACHE SYNDROME: ICD-10-CM

## 2023-02-16 PROCEDURE — 99214 OFFICE O/P EST MOD 30 MIN: CPT | Performed by: NURSE PRACTITIONER

## 2023-02-16 NOTE — PROGRESS NOTES
"Subjective:     Patient ID: Naima Paul is a 58 y.o. female.    CC:   Chief Complaint   Patient presents with   • Headache     Venous sinus thrombosis chronic       HPI:   History of Present Illness   Today 2/16/2023-     This is a 58-year-old female who presents for 6-month neurology follow-up on chronic cerebral venous sinus thrombosis along with headaches present since March 2022. She is on anticoagulation. She has reported some issues with short-term memory. She has been taking gabapentin 300 mg as needed for her headaches. Vitamin B12, folate, and methylmalonic acid levels were checked the last visit and were within normal limits. She is here for follow-up and refills on medications. She is also being treated for attention deficit disorder. She is currently on Adderall, also takes Wellbutrin.    Today, she reports she is doing well. She denies having any headaches. She confirms she is still taking gabapentin as needed, approximately 1 to 2 times per month. She states she will occasionally have mild pressure on the right side of her head in the front. She describes it as \"something is pushing against it.\" She denies numbness or tingling. She states the pressure does not last very long, and it is mild. She confirms she has light sensitivity, and states she cannot handle anything that flashes, even when she is driving. She states she cannot handle fluorescent light when it blinks before the light bulb burns out, and it brings on a headache or dizziness. She reports she is still having problems when she bends over to pick something up, such as taking the trash bag out of the trash can, as she has to bend over to do the task. She states she can hardly stand to bend over, because it gives her a lightheaded and nauseating feeling. She adds that if she squats down, she is okay, no dizziness or headaches occur.     She confirms she is still taking Eliquis. She denies any unusual bruising or bleeding. She denies any blood " "in her stool, urine, or nosebleeds.    She states she use to have migraines, but she has not had a migraine for a long time. She reports before she found out about the blood clots. These have resolved. She states she takes Tylenol occasionally and cannot take any ibuprofen or naproxen due to taking Eliquis. She is allergic to IBUPROFEN.     She confirms she is taking Adderall and Wellbutrin.    She states she did not do speech therapy. She would like to try cognitive therapy. She reports she is still having problems with finding words, but not as bad anymore. She states she cannot tell where certain items or places are, and she has to look it up a lot of times now. She notes that recalling the words is better than it was the previous time she was here, but she still struggles with it.    She states her eye doctor told her she is all cleared up with her vision changes. She states she has a follow-up appointment in 05/2023.    She inquires if she can return to riding horses. She confirms she always wears a helmet when she rides horses. She mentions she has been thrown off from a horse before, but that it has been approximately 20 years since then. [place] states [place], and has horses, which she would like to go riding with the warmer weather on the trail.    She states her balance \"was never good\". She denies any falls.    Prior extensive neurological workup & history:  She was admitted to [facility] from 03/30/2022 until 04/03/2022 for symptoms of right (laterality clarified by patient) arm weakness, paresthesias, and syncope. She had been diagnosed with COVID-19 on 02/15/2022, and her symptoms had improved significantly. She developed acute episodes of numbness that waxed, and waned, and had some transient vision loss, and syncope. At home, her  found her unresponsive on the floor. She was admitted, and underwent neuroimaging with MRI of the brain without contrast showing no acute " stroke or acute intracranial abnormalities. MRA venogram completed on 03/31/2022 showed right transverse, and right sigmoid sinus, venous sinus thrombosis. She also underwent a CT cerebral perfusion with and without contrast, which showed no ischemia. She underwent a CTA of the neck which showed no significant abnormalities, and CTA of the head showed no significant abnormalities. She was started on heparin, and was treated with gabapentin for some headaches she was experiencing. She was discharged home on Eliquis.     During her hospitalization, she also underwent echocardiogram with agitated saline, and this showed a normal ejection fraction of 61 to 65 percent with negative bubble study. Inpatient neurology consultation was completed by Dr. Carbajal, neurology, and her recommendations included the Eliquis 5 mg twice daily for a minimum of 3 months, continuing gabapentin 300 mg 3 times daily for her headaches, and avoiding over-the-counter medications for her headaches to avoid overuse. She also underwent an EEG at the time, and this was normal.     The patient states that 1 month before being admitted to the hospital she had contracted COVID-19.      The patient denies previous thrombus, DVT or PE. She denies family history of bleeding or clotting disorders.      She has anaphylactic reaction to Motrin. She states she has never had any trouble taking aspirin.    She was evaluated by Dr. Davion De Los Santos with [facility] on 07/25/2022. He reviewed her MRA venogram completed on 07/06/2022, and this did show persistent loss of signal intensity within the right transverse sinus and right sigmoid sinus consistent with persistent venous sinus thrombosis. This was completed on 07/06/2022 and compared to 03/31/2022. He felt that the thrombosis of the superior sagittal sinus and right transverse sigmoid sinus were likely attributable to hypercoagulability and recent COVID-19 infection. He recommended that she  continue Eliquis. He also noted that this is likely her new baseline. She does not require any surgical intervention.     She has a history of sammy COVID-19 prior to her hospitalization in March 2022.     The patient's highest level of education is some college. She is retired. 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    The following portions of the patient's history were reviewed and updated as appropriate: allergies, current medications, past family history, past medical history, past social history, past surgical history and problem list.    Past Medical History:   Diagnosis Date   • Anemia    • Anxiety    • CTS (carpal tunnel syndrome) March 1994   • Depression    • Diabetes (HCC)    • Headache, migraine    • Hypertension        Past Surgical History:   Procedure Laterality Date   • ADENOIDECTOMY     • CHOLECYSTECTOMY     • COLONOSCOPY  2016   • DIAGNOSTIC LAPAROSCOPY     • ENDOMETRIAL ABLATION     • TONSILLECTOMY         Social History     Socioeconomic History   • Marital status:    Tobacco Use   • Smoking status: Never   • Smokeless tobacco: Never   Vaping Use   • Vaping Use: Never used   Substance and Sexual Activity   • Alcohol use: Yes     Alcohol/week: 2.0 standard drinks     Types: 2 Drinks containing 0.5 oz of alcohol per week     Comment: occ 2 per week   • Drug use: Never   • Sexual activity: Yes     Partners: Male     Birth control/protection: Post-menopausal       Family History   Problem Relation Age of Onset   • Hypertension Mother    • Cancer Mother    • Seizures Mother    • Drug abuse Father    • Hypertension Father    • Cancer Father    • Alcohol abuse Father    • Depression Father           Current Outpatient Medications:   •  amphetamine-dextroamphetamine (Adderall) 10 MG tablet, Take 1 tablet by mouth 2 (Two) Times a Day., Disp: 60 tablet, Rfl: 0  •  apixaban (ELIQUIS) 5 MG tablet tablet, Take 1 tablet by mouth Every 12 (Twelve) Hours. Indications: Other - full  "anticoagulation, Disp: 180 tablet, Rfl: 3  •  buPROPion XL (WELLBUTRIN XL) 300 MG 24 hr tablet, , Disp: , Rfl:   •  busPIRone (BUSPAR) 7.5 MG tablet, , Disp: , Rfl:   •  Cholecalciferol (VITAMIN D3) 50 MCG (2000 UT) capsule, Take 2,000 Units by mouth Daily., Disp: , Rfl:   •  gabapentin (NEURONTIN) 300 MG capsule, Take 1 capsules every night and then 1 additional capsule during the day if needed, Disp: 60 capsule, Rfl: 5  •  loratadine (CLARITIN) 10 MG tablet, Take 10 mg by mouth Daily., Disp: , Rfl:   •  metoprolol succinate XL (TOPROL-XL) 50 MG 24 hr tablet, metoprolol succinate ER 50 mg tablet,extended release 24 hr  TAKE 1 TABLET BY MOUTH ONCE DAILY \"PT MUST BE SEEN FOR ADDITIONAL REFILS\", Disp: , Rfl:   •  montelukast (SINGULAIR) 10 MG tablet, Take 10 mg by mouth Every Night., Disp: , Rfl:      Review of Systems   Constitutional: Negative for chills, fatigue, fever and unexpected weight change.   HENT: Negative for ear pain, hearing loss, nosebleeds, rhinorrhea and sore throat.    Eyes: Negative for photophobia, pain, discharge, itching and visual disturbance.   Respiratory: Negative for cough, chest tightness, shortness of breath and wheezing.    Cardiovascular: Negative for chest pain, palpitations and leg swelling.   Gastrointestinal: Negative for abdominal pain, blood in stool, constipation, diarrhea, nausea and vomiting.   Genitourinary: Negative for dysuria, frequency, hematuria and urgency.   Musculoskeletal: Negative for arthralgias, back pain, gait problem, joint swelling, myalgias, neck pain and neck stiffness.   Skin: Negative for rash and wound.   Allergic/Immunologic: Negative for environmental allergies and food allergies.   Neurological: Negative for dizziness, tremors, seizures, syncope, speech difficulty, weakness, light-headedness, numbness and headaches.   Hematological: Negative for adenopathy. Does not bruise/bleed easily.   Psychiatric/Behavioral: Negative for agitation, confusion, " "decreased concentration, hallucinations, sleep disturbance and suicidal ideas. The patient is not nervous/anxious.    All other systems reviewed and are negative.       Objective:  /90   Pulse 79   Ht 160 cm (63\")   Wt 96.2 kg (212 lb)   SpO2 97%   BMI 37.55 kg/m²     Neurologic Exam     Mental Status   Oriented to person, place, and time.   Attention: decreased. Concentration: decreased.   Speech: speech is normal   Level of consciousness: alert  Knowledge: good.   Normal comprehension.     Decreased word finding issues     Cranial Nerves   Cranial nerves II through XII intact.     Motor Exam   Muscle bulk: normal  Overall muscle tone: normal    Strength   Strength 5/5 throughout.     Gait, Coordination, and Reflexes     Gait  Gait: normal    Coordination   Finger to nose coordination: normal    Tremor   Resting tremor: absent  Intention tremor: absent  Action tremor: absent    Reflexes   Reflexes 2+ except as noted.   Right : 2+  Left : 2+      Physical Exam  Constitutional:       Appearance: Normal appearance. She is obese.      Comments: BMI 37.6   Neurological:      Mental Status: She is alert and oriented to person, place, and time.      Cranial Nerves: Cranial nerves 2-12 are intact.      Motor: Motor strength is normal.      Coordination: Finger-Nose-Finger Test normal.      Gait: Gait is intact.   Psychiatric:         Mood and Affect: Mood is anxious.         Speech: Speech normal.         Behavior: Behavior normal.         Thought Content: Thought content normal.         Cognition and Memory: Memory normal. Cognition is impaired (trouble with word finding).         Judgment: Judgment normal.     Yogi cognitive assessment score today is a 29 out of 30 with 5 out of 5 for word recall. She only missed points for fluency.    Assessment/Plan:       Diagnoses and all orders for this visit:    1. Cognitive communication disorder (Primary)  -     Ambulatory Referral to Speech Therapy    2. " Chronic cerebral venous sinus thrombosis  Comments:  continue eliquis lifelong    3. Other headache syndrome  Comments:  episodic, mild, uses tylenol and gabapentin prn, declines on medications today    She is doing well overall. Her vision has returned to normal. She is still having some difficulty with thinking of words and would like to complete cognitive therapy. We will place that referral. She is not having constant headaches anymore that has resolved. She has episodic headaches around the right temporal region in the right forehead. She uses gabapentin and Tylenol if needed. This is only a couple times a month. She does not feel like these are like migraines like she had in the past. She reports no recent migraines. She declines any additional medication today. Does not need refills on gabapentin and only takes it if needed now. We discussed we would not recommend any additional or any serial imaging of the venous sinus thrombosis, but we did discuss at length signs and symptoms of stroke and when she should report to the ER immediately for further evaluation.    We discussed safety around horses and wearing a helmet and preventing head injuries or falls if she is horseback riding or gong anywhere around the animals.    She will follow up in 6 months or sooner if needed.     Reviewed medications, potential side effects and signs and symptoms to report. Discussed risk versus benefits of treatment plan with patient and/or family-including medications, labs and radiology that may be ordered. Addressed questions and concerns during visit. Patient and/or family verbalized understanding and agree with plan.    AS THE PROVIDER, I PERSONALLY WORE PPE DURING ENTIRE FACE TO FACE ENCOUNTER IN CLINIC WITH THE PATIENT. PATIENT ALSO WORE PPE DURING ENTIRE FACE TO FACE ENCOUNTER EXCEPT FOR A MAX OF 30 SECONDS DURING NEUROLOGICAL EVALUATION OF CRANIAL NERVES AND THEN MASK WAS PLACED BACK OVER PATIENT FACE FOR REMAINDER OF  VISIT. I WASHED MY HANDS BEFORE AND AFTER VISIT.    During this visit the following were done:  Labs Reviewed [x]    Labs Ordered []    Radiology Reports Reviewed [x]    Radiology Ordered []    PCP Records Reviewed []    Referring Provider Records Reviewed []    ER Records Reviewed []    Hospital Records Reviewed []    History Obtained From Family []    Radiology Images Reviewed []    Other Reviewed [x]    Records Requested []      Transcribed from ambient dictation for BERE Elmore by Huong Chow.  02/16/23   14:36 EST    Patient or patient representative verbalized consent to the visit recording.  I have personally performed the services described in this document as transcribed by the above individual, and it is both accurate and complete.  BERE Elmore  2/16/2023  16:25 EST

## 2023-02-23 ENCOUNTER — OFFICE VISIT (OUTPATIENT)
Dept: PHYSICAL THERAPY | Facility: CLINIC | Age: 58
End: 2023-02-23
Payer: COMMERCIAL

## 2023-02-23 DIAGNOSIS — R41.841 COGNITIVE COMMUNICATION DISORDER: ICD-10-CM

## 2023-02-23 PROCEDURE — 96125 COGNITIVE TEST BY HC PRO: CPT | Performed by: SPEECH-LANGUAGE PATHOLOGIST

## 2023-02-24 NOTE — PROGRESS NOTES
Outpatient Speech Language Pathology   Adult Speech Language Cognitive Initial Evaluation   [address]        Patient Name: Naima Paul  : 1965  MRN: [id number]  Today's Date: 2023        Visit Date: 2023   Patient Active Problem List   Diagnosis   • Acute cerebral venous sinus thrombosis   • HTN (hypertension)   • Syncope and collapse   • Anxiety associated with depression   • Leukocytosis   • Personal history of COVID-19   • Left arm weakness   • Daily headache   • COVID   • Myopia   • Presbyopia   • Mixed anxiety and depressive disorder   • Venous thrombosis   • Obstructive sleep apnea syndrome   • Hyperlipidemia   • Hyperglycemia   • Cognitive communication disorder   • Other headache syndrome        Past Medical History:   Diagnosis Date   • Anemia    • Anxiety    • CTS (carpal tunnel syndrome) 1994   • Depression    • Diabetes (HCC)    • Headache, migraine    • Hypertension         Past Surgical History:   Procedure Laterality Date   • ADENOIDECTOMY     • CHOLECYSTECTOMY     • COLONOSCOPY     • DIAGNOSTIC LAPAROSCOPY     • ENDOMETRIAL ABLATION     • TONSILLECTOMY           Visit Dx:    ICD-10-CM ICD-9-CM   1. Cognitive communication disorder  R41.841 315.32            OP SLP Assessment/Plan - 23 0900        SLP Assessment    Functional Problems Speech Language- Adult/Cognition  -RB    Impact on Function: Adult Speech Language/Cognition Restrictions in personal and social life;Difficulty sequencing thoughts to express complex messages  -RB    Clinical Impression: Speech Language-Adult/Congnition Mild:;Cognitive Communication Impairment  -RB    Functional Problems Comment Pt experiences difficulty participating in social situations due to difficulty with word finding and memory  -RB    Clinical Impression Comments Would benefit from skilled ST  -RB    Please refer to paper survey for additional self-reported information Yes  -RB    Please refer to items scanned into chart  for additional diagnostic informaiton and handouts as provided by clinician Yes  -RB    SLP Diagnosis Cognitive Communication Impairment  -RB    Prognosis Good (comment)  -RB    Patient/caregiver participated in establishment of treatment plan and goals Yes  -RB    Patient would benefit from skilled therapy intervention Yes  -RB       SLP Plan    Frequency 1x weekly  -RB    Duration 8 weeks  -RB    Planned CPT's? SLP INDIVIDUAL SPEECH THERAPY: 86394  -RB    Expected Duration of Therapy Session (SLP Eval) 45  -RB    Plan Comments Initiate POC  -RB          User Key  (r) = Recorded By, (t) = Taken By, (c) = Cosigned By    Initials Name Provider Type    RB Alee Wong, SLP Speech and Language Pathologist                 SLP SLC Evaluation - 02/24/23 0800        Communication Assessment/Intervention    Document Type evaluation  -RB    Total Evaluation Minutes, SLP 45  -RB    Subjective Information no complaints  -RB    Patient Observations alert;cooperative;agree to therapy  -RB    Patient Effort good  -RB    Symptoms Noted During/After Treatment none  -RB       General Information    Patient Profile Reviewed yes  -RB    Pertinent History Of Current Problem Pt lives with  and 2 kids, and she is retired from [employer]. In March of 2022 pt experienced a cerebral venous sinus thrombosis (CVST) on the right hemisphere and had ongoing headaches, dizziness, memory, attention, and language concerns following the incident. She reports that her symptoms have improved, but she is still concerned about her memory, attention, and word finding. She states that she experiences lapses in memory related to events prior to her CVST, and that she does not remember events directly following the incident. Pt reports frustration with her language due to word finding and others finishing her sentences for her. At baseline pt has ADD.  -RB    Precautions/Limitations, Vision WFL with corrective lenses;for purposes of eval  -RB     Precautions/Limitations, Hearing WFL;for purposes of eval  -RB    Prior Level of Function-Communication WFL  -RB    Plans/Goals Discussed with patient  -RB    Barriers to Rehab none identified  -RB    Patient's Goals for Discharge return to all previous roles/activities  -RB    Standardized Assessment Used RBANS  -RB       Pain    Additional Documentation Pain Scale: Numbers Pre/Post-Treatment (Group)  -RB       Pain Scale: Numbers Pre/Post-Treatment    Pretreatment Pain Rating 0/10 - no pain  -RB    Posttreatment Pain Rating 0/10 - no pain  -RB       Oral Motor Structure and Function    Oral Motor Structure and Function WFL  -RB       Oral Musculature and Cranial Nerve Assessment    Oral Motor General Assessment WFL  -RB       Cognitive Assessment Intervention- SLP    Cognitive Function (Cognition) mild impairment  -RB    Orientation Status (Cognition) awareness of basic personal information;person;place;time;situation;WFL  -RB    Memory (Cognitive) mild impairment;short-term  -RB    Attention (Cognitive) WFL;sustained   pt has baseline ADD -RB    Thought Organization (Cognitive) mild impairment;concrete divergent  -RB    Reasoning (Cognitive) WFL;simple  -RB    Problem Solving (Cognitive) WFL;simple  -RB    Executive Function (Cognition) WFL;home management activities;deficit awareness  -RB    Pragmatics (Communication) WFL  -RB    Right Hemisphere Function WFL;visuo-perceptual;visuo-spatial;pragmatics  -RB    Cognition, Comment Pt's cognition negatively impacts her QoL due to interefering with her social participation, ability to recall events, and overall communicative satisfaction.  -RB       Standardized Tests    Cognitive/Memory Tests RBANS: Repeatable Battery for the Assessment of Neuropsychological Status  -RB       RBANS- Repeatable Battery for the Assessment of Neuropsychological Status    Immediate Memory Index Score 109  -RB    Immediate Memory Qualitative Description average;high average  -RB     Visuospatial Index Score 105  -RB    Visuospatial Qualitative Description average  -RB    Language Index Score 109  -RB    Language Qualitative Description average;high average  -RB    Attention Index Score 118  -RB    Attention Qualitative Description superior  -RB    Delayed Memory Index Score 124  -RB    Delayed Memory Qualitative Description superior  -RB    Total Index Score 565  -RB    RBANS Comments Pt reports that she is not performing at her baseline prior to her injury  -RB       SLP Evaluation Clinical Impressions    SLP Diagnosis mild;communication disorder  -RB    SLP Diagnosis Comments Cognitive Communication Deficit  -RB    Rehab Potential/Prognosis good  -RB    SLC Criteria for Skilled Therapy Interventions Met yes  -RB    Functional Impact functional impact in social situations;difficulty in expressing complex messages;restrictions in personal and social life  -RB          User Key  (r) = Recorded By, (t) = Taken By, (c) = Cosigned By    Initials Name Provider Type    Alee Sheridan SLP Speech and Language Pathologist                       ACTIVITY  ACCURACY ASSISTANCE NEEDED   LTGs:   Pt and family will implement compensatory strategies to maximize patient’s memory function so patient can continue to participate in daily activities at 90% no cues     Pt will be able to remember information needed to participate in avocational activities at 90% accuracy no cues        STG:  Pt’s memory skills will be enhanced as reported by patient by utilizing internal memory strategies to recall up to 3-5 pieces of information after a 5 minute delay        STG:   Pt will utilize word finding strategies at 90% accuracy and no cues         STG:  Pt will demonstrate improved ability to recall information by listening/reading functional information and answering  questions/ summarizing information at 90% accuracy and no cues      STG:   Pt will utilize external memory aids for recall and thought organization with  no cues at 90%          Certification: 2/23/23-5/22/23             OP SLP Education     Row Name 02/24/23 1000       Education    Barriers to Learning No barriers identified  -RB    Education Provided Described results of evaluation;Patient expressed understanding of evaluation;Patient participated in establishing goals and treatment plan  -RB    Assessed Learning needs;Learning motivation;Learning preferences;Learning readiness  -RB    Learning Motivation Strong  -RB    Learning Method Explanation;Demonstration;Teach back;Written materials  -RB    Teaching Response Verbalized understanding;Demonstrated understanding  -RB    Education Comments HEP: cognitive communication strategies  -RB          User Key  (r) = Recorded By, (t) = Taken By, (c) = Cosigned By    Initials Name Effective Dates    Alee Sheridan SLP 06/16/21 -                     PHYSICIAN: Carolin Swartz APRN    NPI: [id number]         I certify that the therapy services are furnished while this patient is under my care.  The services outlined above are required by this patient, and will be reviewed every 90 days.                PHYSICIAN:                                         DATE:      Please sign and return via fax to [phone].   Thank you,   [facility].    [facility] Language Pathology   [address]  [place] [address]  Alee Wong MA CCC-SLP [facility] license: [id number]      2/24/2023

## 2023-03-06 ENCOUNTER — OFFICE VISIT (OUTPATIENT)
Dept: BEHAVIORAL HEALTH | Facility: CLINIC | Age: 58
End: 2023-03-06
Payer: COMMERCIAL

## 2023-03-06 VITALS
HEIGHT: 63 IN | SYSTOLIC BLOOD PRESSURE: 126 MMHG | BODY MASS INDEX: 37 KG/M2 | HEART RATE: 69 BPM | DIASTOLIC BLOOD PRESSURE: 62 MMHG | WEIGHT: 208.8 LBS

## 2023-03-06 DIAGNOSIS — F90.2 ATTENTION DEFICIT HYPERACTIVITY DISORDER (ADHD), COMBINED TYPE, MILD: Primary | ICD-10-CM

## 2023-03-06 PROCEDURE — 99214 OFFICE O/P EST MOD 30 MIN: CPT

## 2023-03-06 RX ORDER — DEXTROAMPHETAMINE SACCHARATE, AMPHETAMINE ASPARTATE, DEXTROAMPHETAMINE SULFATE AND AMPHETAMINE SULFATE 5; 5; 5; 5 MG/1; MG/1; MG/1; MG/1
20 TABLET ORAL 2 TIMES DAILY
Qty: 60 TABLET | Refills: 0 | Status: SHIPPED | OUTPATIENT
Start: 2023-03-06

## 2023-03-06 RX ORDER — ESCITALOPRAM OXALATE 10 MG/1
TABLET ORAL
COMMUNITY
Start: 2023-02-16

## 2023-03-06 NOTE — PROGRESS NOTES
"  Follow Up Office Visit    Patient Name: Naima Paul  : 1965   MRN: [id number]   Care Team: Patient Care Team:  Rubi Jackson PA as PCP - General (Physician Assistant)  Carol Rao APRN as Nurse Practitioner (Behavioral Health)        Chief Complaint:    Chief Complaint   Patient presents with   • ADHD   • Med Management       History of Present Illness: Naima Paul is a 58 y.o. female who is here today for a medication management follow up. Patient reports that she noticed a slight improvement since starting the Adderall but does feel that she still straggles with focus and task completion. She denies any adverse reactions to the medication and states that she has felt more motivated to get things done, but she still takes a while to accomplish her tasks.    The following portion of the patient's history were reviewed and updated appropriately: allergies, current and past medications, family history, medical history and social history.  Subjective   Review of Systems:    Review of Systems   Psychiatric/Behavioral: Positive for decreased concentration and stress.   All other systems reviewed and are negative.      Current Medications:   Current Outpatient Medications   Medication Sig Dispense Refill   • apixaban (ELIQUIS) 5 MG tablet tablet Take 1 tablet by mouth Every 12 (Twelve) Hours. Indications: Other - full anticoagulation 180 tablet 3   • buPROPion XL (WELLBUTRIN XL) 300 MG 24 hr tablet      • busPIRone (BUSPAR) 7.5 MG tablet      • Cholecalciferol (VITAMIN D3) 50 MCG ( [place]) capsule Take 1 capsule by mouth Daily.     • escitalopram (LEXAPRO) 10 MG tablet      • loratadine (CLARITIN) 10 MG tablet Take 1 tablet by mouth Daily.     • metoprolol succinate XL (TOPROL-XL) 50 MG 24 hr tablet metoprolol succinate ER 50 mg tablet,extended release 24 hr   TAKE 1 TABLET BY MOUTH ONCE DAILY \"PT MUST BE SEEN FOR ADDITIONAL REFILS\"     • montelukast (SINGULAIR) 10 MG tablet Take 1 tablet by " "mouth Every Night.     • amphetamine-dextroamphetamine (Adderall) 20 MG tablet Take 1 tablet by mouth 2 (Two) Times a Day. 60 tablet 0   • gabapentin (NEURONTIN) 300 MG capsule Take 1 capsules every night and then 1 additional capsule during the day if needed 60 capsule 5     No current facility-administered medications for this visit.       Mental Status Exam:   Hygiene:   good  Cooperation:  Cooperative  Eye Contact:  Good  Psychomotor Behavior:  Appropriate  Affect:  Appropriate  Mood: normal  Speech:  Normal  Thought Process:  Goal directed and Linear  Thought Content:  Normal and Mood congruent  Suicidal:  None  Homicidal:  None  Hallucinations:  None  Delusion:  None  Memory:  Intact  Orientation:  Person, Place, Time and Situation  Reliability:  good  Insight:  Good  Judgement:  Good  Impulse Control:  Good  Physical/Medical Issues:  Yes see chart     Objective   Vital Signs:   /62   Pulse 69   Ht 160 cm (63\")   Wt 94.7 kg (208 lb 12.8 oz)   BMI 36.99 kg/m²       Assessment / Plan    Diagnoses and all orders for this visit:    1. Attention deficit hyperactivity disorder (ADHD), combined type, mild (Primary)  -     amphetamine-dextroamphetamine (Adderall) 20 MG tablet; Take 1 tablet by mouth 2 (Two) Times a Day.  Dispense: 60 tablet; Refill: 0     Will increase Adderall to 20mg BID.     A psychological evaluation was conducted in order to assess past and current level of functioning. Areas assessed included, but were not limited to: perception of social support, perception of ability to face and deal with challenges in life (positive functioning), anxiety symptoms, depressive symptoms, perspective on beliefs/belief system, coping skills for stress, intelligence level,  Therapeutic rapport was established. Interventions conducted today were geared towards incorporating medication management along with support for continued therapy. Education was also provided as to the med management with this " provider and what to expect in subsequent sessions.      We discussed risks, benefits, goals and side effects of the above medication and the patient was agreeable with the plan. Patient was educated on the importance of compliance with treatment and follow-up appointments. Patient is aware to contact the [facility] with any worsening of symptoms. To call for questions or concerns and return early if necessary. Patent is agreeable to go to the Emergency Department or call 911 should they begin SI/HI.      PHQ-2/PHQ-9: Depression Screening  Little Interest or Pleasure in Doing Things: 2-->more than half the days  Feeling Down, Depressed or Hopeless: 1-->several days  PHQ-2 Total Score: 3  Trouble Falling or Staying Asleep, or Sleeping Too Much: 2-->more than half the days  Feeling Tired or Having Little Energy: 2-->more than half the days  Poor Appetite or Overeatin-->several days  Feeling Bad about Yourself - or that You are a Failure or Have Let Yourself or Your Family Down: 1-->several days  Trouble Concentrating on Things, Such as Reading the Newspaper or Watching Television: 2-->more than half the days  Moving or Speaking So Slowly that Other People Could Have Noticed? Or the Opposite - Being So Fidgety: 1-->several days  Thoughts that You Would be Better Off Dead or of Hurting Yourself in Some Way: 0-->not at all  PHQ-9: Brief Depression Severity Measure Score: 12  If You Checked Off Any Problems, How Difficult Have These Problems Made It For You to Do Your Work, Take Care of Things at Home, or Get Along with Other People?: not difficult at all      PHQ-9 Score:   PHQ-9 Total Score: 12    Depression Screening:  Patient screened positive for depression based on a PHQ-9 score of 12 on 3/6/2023. Follow-up recommendations include: Suicide Risk Assessment performed.      Over the last two weeks, how often have you been bothered by the following problems?  Feeling nervous, anxious or on edge: Several  days  Not being able to stop or control worrying: More than half the days  Worrying too much about different things: More than half the days  Trouble Relaxing: Several days  Being so restless that it is hard to sit still: Not at all  Becoming easily annoyed or irritable: Several days  Feeling afraid as if something awful might happen: Not at all  SAMIA 7 Total Score: 7        Screening for Adults With ADHD - (1-6)  1. How often do you have trouble wrapping up the final details of a project, once the challenging parts have been done?: Sometimes  2. How often do you have difficulty getting things in order when you have to do a task that requires organization?: Often  3. How often do you have problems remembering appointments or obligations : Very Often  4. When you have a task that requires a lot of thought, how often do you avoid or delay getting started ?: Very Often  5. How often do you fidget or squirm with your hands or feet when you have to sit down for a long time?: Often  6. How often do you feel overly active and compelled to do things, like you were driven by a motor?: Rarely  7. How often do you make careless mistakes when you have to work on a boring or difficult project?: Sometimes  8. How often do have difficulty keeping your attention when you are doing boring or repetitive work?: Very Often  9. How often do you have difficulty concentrating on what people say to you, even when they are speaking to you: Very Often  10.How often do you misplace or have difficulty finding things at home or at work?: Rarely  11.How often are you distracted by activity or noise around you?: Often  12.How often do you leave your seat in meetings or other situations in which you are expected to remain seated?: Never  13.How often do you feel restless or fidgety?: Never  14.How often do you have difficulty unwinding and relaxing when you have time to yourself?: Sometimes  15.How often do you find yourself talking too much when  you are in social situations?: Sometimes  16.When you’re in a conversation, how often do you find yourself finishing the sentences of the people you are talking to, before they can finish them themselves?: Never  17.How often do you have difficulty waiting your turn in situations when turn taking is required?: Rarely  18.How often do you interrupt others when they are busy?: Sometimes        MEDS ORDERED DURING VISIT:  New Medications Ordered This Visit   Medications   • amphetamine-dextroamphetamine (Adderall) 20 MG tablet     Sig: Take 1 tablet by mouth 2 (Two) Times a Day.     Dispense:  60 tablet     Refill:  0         Follow Up   Return in about 6 weeks (around 4/17/2023).  Patient was given instructions and counseling regarding her condition or for health maintenance advice. Please see specific information pulled into the AVS if appropriate.     TREATMENT PLAN/GOALS: Continue supportive psychotherapy efforts and medications as indicated. Treatment and medication options discussed during today's visit. Patient acknowledged and verbally consented to continue with current treatment plan and was educated on the importance of compliance with treatment and follow-up appointments.    MEDICATION ISSUES:  Discussed medication options and treatment plan of prescribed medication as well as the risks, benefits, and side effects including potential falls, possible impaired driving and metabolic adversities among others. Patient is agreeable to call the office with any worsening of symptoms or onset of side effects. Patient is agreeable to call 911 or go to the nearest ER should he/she begin having SI/HI.        [facility] [place] [address]  [phone]    March 6, 2023 15:35 EST

## 2023-03-16 ENCOUNTER — TREATMENT (OUTPATIENT)
Dept: PHYSICAL THERAPY | Facility: CLINIC | Age: 58
End: 2023-03-16
Payer: COMMERCIAL

## 2023-03-16 DIAGNOSIS — R41.841 COGNITIVE COMMUNICATION DEFICIT: Primary | ICD-10-CM

## 2023-03-16 PROCEDURE — 92507 TX SP LANG VOICE COMM INDIV: CPT | Performed by: SPEECH-LANGUAGE PATHOLOGIST

## 2023-03-16 NOTE — PROGRESS NOTES
Outpatient Speech Language Pathology   Adult Speech Language Cognitive Treatment Note  [address]        Patient Name: Naima Paul  : 1965  MRN: [id number]  Today's Date: 3/16/2023        Visit Date: 2023   Patient Active Problem List   Diagnosis   • Acute cerebral venous sinus thrombosis   • HTN (hypertension)   • Syncope and collapse   • Anxiety associated with depression   • Leukocytosis   • Personal history of COVID-19   • Left arm weakness   • Daily headache   • COVID   • Myopia   • Presbyopia   • Mixed anxiety and depressive disorder   • Venous thrombosis   • Obstructive sleep apnea syndrome   • Hyperlipidemia   • Hyperglycemia   • Cognitive communication disorder   • Other headache syndrome        Past Medical History:   Diagnosis Date   • Anemia    • Anxiety    • CTS (carpal tunnel syndrome) 1994   • Depression    • Diabetes (HCC)    • Headache, migraine    • Hypertension         Past Surgical History:   Procedure Laterality Date   • ADENOIDECTOMY     • CHOLECYSTECTOMY     • COLONOSCOPY     • DIAGNOSTIC LAPAROSCOPY     • ENDOMETRIAL ABLATION     • TONSILLECTOMY           Visit Dx:    ICD-10-CM ICD-9-CM   1. Cognitive communication deficit  R41.841 799.52            OP SLP Assessment/Plan - 23 0900        SLP Assessment    Functional Problems Speech Language- Adult/Cognition  -RB    Impact on Function: Adult Speech Language/Cognition Restrictions in personal and social life;Difficulty sequencing thoughts to express complex messages  -RB    Clinical Impression: Speech Language-Adult/Congnition Mild:;Cognitive Communication Impairment  -RB    Functional Problems Comment Pt experiences difficulty participating in social situations due to difficulty with word finding and memory  -RB    Clinical Impression Comments Would benefit from skilled ST  -RB    Please refer to paper survey for additional self-reported information Yes  -RB    Please refer to items scanned into chart for  "additional diagnostic informaiton and handouts as provided by clinician Yes  -RB    SLP Diagnosis Cognitive Communication Impairment  -RB    Prognosis Good (comment)  -RB    Patient/caregiver participated in establishment of treatment plan and goals Yes  -RB    Patient would benefit from skilled therapy intervention Yes  -RB       SLP Plan    Frequency 1x weekly  -RB    Duration 8 weeks  -RB    Planned CPT's? SLP INDIVIDUAL SPEECH THERAPY: 12097  -RB    Expected Duration of Therapy Session (SLP Eval) 45  -RB    Plan Comments Initiate POC  -RB          User Key  (r) = Recorded By, (t) = Taken By, (c) = Cosigned By    Initials Name Provider Type    Alee Sheridan, SLP Speech and Language Pathologist                Pain: 0  Subjective: \"good\" pt reports a fun trip to [place]           ACTIVITY  ACCURACY ASSISTANCE NEEDED   LTGs:   Pt and family will implement compensatory strategies to maximize patient’s memory function so patient can continue to participate in daily activities at 90% no cues     Pt will be able to remember information needed to participate in avocational activities at 90% accuracy no cues   Assessed medicine management, health literacy, budgeting, calendar management Medicine management: 100%  Health Literacy: 100%  Budgetin%  Calendar Management: 100%    STG:  Pt’s memory skills will be enhanced as reported by patient by utilizing internal memory strategies to recall up to 3-5 pieces of information after a 5 minute delay   Modeled/targeted association and grouping Name recall:  no cues    Name and occupation recall:  no cues    Grocery list recall:  no cues No cues       STG:   Pt will utilize word finding strategies at 90% accuracy and no cues    Not targeted     STG:  Pt will demonstrate improved ability to recall information by listening/reading functional information and answering  questions/ summarizing information at 90% accuracy and no cues Not targeted     STG:   Pt will utilize " external memory aids for recall and thought organization with no cues at 90% Targeted calendar and medicine management 100% No cues     Certification: 2/23/23-5/22/23    Target name and occupation next session         OP SLP Education     Row Name 02/24/23 1000       Education    Barriers to Learning No barriers identified  -RB    Education Provided Described results of evaluation;Patient expressed understanding of evaluation;Patient participated in establishing goals and treatment plan  -RB    Assessed Learning needs;Learning motivation;Learning preferences;Learning readiness  -RB    Learning Motivation Strong  -RB    Learning Method Explanation;Demonstration;Teach back;Written materials  -RB    Teaching Response Verbalized understanding;Demonstrated understanding  -RB    Education Comments HEP: association -RB          User Key  (r) = Recorded By, (t) = Taken By, (c) = Cosigned By    Initials Name Effective Dates    Alee Sheridan, SLP 06/16/21 -             ASHLEY Campbell , provided therapy with my direct supervision.        Alee Wong MA, CCC-SLP [facility]  [place] license: [id number]      3/16/2023

## 2023-03-31 ENCOUNTER — TREATMENT (OUTPATIENT)
Dept: PHYSICAL THERAPY | Facility: CLINIC | Age: 58
End: 2023-03-31
Payer: COMMERCIAL

## 2023-03-31 DIAGNOSIS — R41.841 COGNITIVE COMMUNICATION DEFICIT: Primary | ICD-10-CM

## 2023-03-31 PROCEDURE — 92507 TX SP LANG VOICE COMM INDIV: CPT | Performed by: SPEECH-LANGUAGE PATHOLOGIST

## 2023-03-31 NOTE — PROGRESS NOTES
Outpatient Speech Language Pathology   Adult Speech Language Cognitive Progress Note  [address]        Patient Name: Naima Paul  : 1965  MRN: [id number]  Today's Date: 3/31/2023        Visit Date: 2023   Patient Active Problem List   Diagnosis   • Acute cerebral venous sinus thrombosis   • HTN (hypertension)   • Syncope and collapse   • Anxiety associated with depression   • Leukocytosis   • Personal history of COVID-19   • Left arm weakness   • Daily headache   • COVID   • Myopia   • Presbyopia   • Mixed anxiety and depressive disorder   • Venous thrombosis   • Obstructive sleep apnea syndrome   • Hyperlipidemia   • Hyperglycemia   • Cognitive communication disorder   • Other headache syndrome        Past Medical History:   Diagnosis Date   • Anemia    • Anxiety    • CTS (carpal tunnel syndrome) 1994   • Depression    • Diabetes (HCC)    • Headache, migraine    • Hypertension         Past Surgical History:   Procedure Laterality Date   • ADENOIDECTOMY     • CHOLECYSTECTOMY     • COLONOSCOPY     • DIAGNOSTIC LAPAROSCOPY     • ENDOMETRIAL ABLATION     • TONSILLECTOMY           Visit Dx:    ICD-10-CM ICD-9-CM   1. Cognitive communication deficit  R41.841 799.52            OP SLP Assessment/Plan - 23 0900        SLP Assessment    Functional Problems Speech Language- Adult/Cognition  -RB    Impact on Function: Adult Speech Language/Cognition Restrictions in personal and social life;Difficulty sequencing thoughts to express complex messages  -RB    Clinical Impression: Speech Language-Adult/Congnition Mild:;Cognitive Communication Impairment  -RB    Functional Problems Comment Pt experiences difficulty participating in social situations due to difficulty with word finding and memory  -RB    Clinical Impression Comments Pt responsive to tx and sx presented today; pt expresses difficulty with reading comprehension and strengths in auditory memory  -RB    Please refer to paper survey for  "additional self-reported information Yes  -RB    Please refer to items scanned into chart for additional diagnostic informaiton and handouts as provided by clinician Yes  -RB    SLP Diagnosis Cognitive Communication Impairment  -RB    Prognosis Good (comment)  -RB    Patient/caregiver participated in establishment of treatment plan and goals Yes  -RB    Patient would benefit from skilled therapy intervention Yes  -RB       SLP Plan    Frequency 1x weekly  -RB    Duration 7 weeks  -RB    Planned CPT's? SLP INDIVIDUAL SPEECH THERAPY: 60146  -RB    Expected Duration of Therapy Session (SLP Haris) 45  -RB    Plan Comments continue POC  -RB          User Key  (r) = Recorded By, (t) = Taken By, (c) = Cosigned By    Initials Name Provider Type    RB Alee Wong, SLP Speech and Language Pathologist                Pain: 0  Subjective: Pt joked \"thinking about buying a boat\" due to weather           ACTIVITY  ACCURACY ASSISTANCE NEEDED   LTGs:   Pt and family will implement compensatory strategies to maximize patient’s memory function so patient can continue to participate in daily activities at 90% no cues     Pt will be able to remember information needed to participate in avocational activities at 90% accuracy no cues   Targeted internal and external memory strategies                    Targeted summarization 95%                        85%  cues                         cues   STG:  Pt’s memory skills will be enhanced as reported by patient by utilizing internal memory strategies to recall up to 3-5 pieces of information after a 5 minute delay   Modeled/targeted association and grouping Name recall: 4/4 5 min delay no cues        To do list recall: 5/5 10 min delay no cues No cues       STG:   Pt will utilize word finding strategies at 90% accuracy and no cues    Not targeted     STG:  Pt will demonstrate improved ability to recall information by listening/reading functional information and answering  questions/ " summarizing information at 90% accuracy and no cues Targeted visual and auditory recall Article recall: 90%  Pod cast recall: 85% No cues   STG:   Pt will utilize external memory aids for recall and thought organization with no cues at 90% Modeled note taking       Certification: 2/23/23-5/22/23  Next session: target word finding; discuss distractions; target occupations       OP SLP Education     Row Name        Education    Barriers to Learning No barriers identified  -RB    Education Provided Described results of evaluation;Patient expressed understanding of evaluation;Patient participated in establishing goals and treatment plan  -RB    Assessed Learning needs;Learning motivation;Learning preferences;Learning readiness  -RB    Learning Motivation Strong  -RB    Learning Method Explanation;Demonstration;Teach back;Written materials  -RB    Teaching Response Verbalized understanding;Demonstrated understanding  -RB    Education Comments HEP: grouping -RB          User Key  (r) = Recorded By, (t) = Taken By, (c) = Cosigned By    Initials Name Effective Dates    Alee Sheridan, SLP 06/16/21 -             ASHLEY Campbell , provided therapy with my direct supervision.        Alee Wong MA CCC-SLP [facility] license: [id number]      3/31/2023

## 2023-04-14 ENCOUNTER — TELEPHONE (OUTPATIENT)
Dept: PHYSICAL THERAPY | Facility: CLINIC | Age: 58
End: 2023-04-14

## 2023-04-19 ENCOUNTER — OFFICE VISIT (OUTPATIENT)
Dept: BEHAVIORAL HEALTH | Facility: CLINIC | Age: 58
End: 2023-04-19
Payer: COMMERCIAL

## 2023-04-19 VITALS — HEIGHT: 63 IN | BODY MASS INDEX: 36.5 KG/M2 | WEIGHT: 206 LBS

## 2023-04-19 DIAGNOSIS — F90.2 ATTENTION DEFICIT HYPERACTIVITY DISORDER (ADHD), COMBINED TYPE, MILD: Primary | ICD-10-CM

## 2023-04-19 DIAGNOSIS — F33.1 MAJOR DEPRESSIVE DISORDER, RECURRENT EPISODE, MODERATE: ICD-10-CM

## 2023-04-19 DIAGNOSIS — F41.1 GENERALIZED ANXIETY DISORDER: ICD-10-CM

## 2023-04-19 PROCEDURE — 99214 OFFICE O/P EST MOD 30 MIN: CPT

## 2023-04-19 RX ORDER — ESCITALOPRAM OXALATE 10 MG/1
10 TABLET ORAL DAILY
Qty: 30 TABLET | Refills: 1 | Status: SHIPPED | OUTPATIENT
Start: 2023-04-19

## 2023-04-19 RX ORDER — BUPROPION HYDROCHLORIDE 300 MG/1
300 TABLET ORAL EVERY MORNING
Qty: 30 TABLET | Refills: 1 | Status: SHIPPED | OUTPATIENT
Start: 2023-04-19

## 2023-04-19 RX ORDER — BUSPIRONE HYDROCHLORIDE 7.5 MG/1
7.5 TABLET ORAL 2 TIMES DAILY PRN
Qty: 60 TABLET | Refills: 1 | Status: SHIPPED | OUTPATIENT
Start: 2023-04-19

## 2023-04-19 NOTE — PROGRESS NOTES
Follow Up Office Visit    Patient Name: Naima Paul  : 1965   MRN: [id number]   Care Team: Patient Care Team:  Rubi Jackson PA as PCP - General (Physician Assistant)  Carol Rao APRN as Nurse Practitioner (Behavioral Health)        Chief Complaint:    Chief Complaint   Patient presents with   • ADHD   • Anxiety   • Depression   • Med Management       History of Present Illness: Naima Paul is a 58 y.o. female who is here today for a medication management follow up. Patient reports that she doesn't feel that the Adderall is working as well as it is supposed to. She continues to struggle with focus and task completion. She also has noticed an increase in her jaw clinching. She also feels that she can be irritable and anxious as well.     The following portion of the patient's history were reviewed and updated appropriately: allergies, current and past medications, family history, medical history and social history.  Subjective   Review of Systems:    Review of Systems   Psychiatric/Behavioral: Positive for agitation, decreased concentration and stress. The patient is nervous/anxious.    All other systems reviewed and are negative.      Current Medications:   Current Outpatient Medications   Medication Sig Dispense Refill   • amphetamine-dextroamphetamine (Adderall) 20 MG tablet Take 1 tablet by mouth 2 (Two) Times a Day. 60 tablet 0   • apixaban (ELIQUIS) 5 MG tablet tablet Take 1 tablet by mouth Every 12 (Twelve) Hours. Indications: Other - full anticoagulation 180 tablet 3   • buPROPion XL (WELLBUTRIN XL) 300 MG 24 hr tablet Take 1 tablet by mouth Every Morning. 30 tablet 1   • busPIRone (BUSPAR) 7.5 MG tablet Take 1 tablet by mouth 2 (Two) Times a Day As Needed (anxiety). 60 tablet 1   • Cholecalciferol (VITAMIN D3) 50 MCG ( UT) capsule Take 1 capsule by mouth Daily.     • escitalopram (LEXAPRO) 10 MG tablet Take 1 tablet by mouth Daily. 30 tablet 1   • loratadine (CLARITIN) 10 MG  "tablet Take 1 tablet by mouth Daily.     • metoprolol succinate XL (TOPROL-XL) 50 MG 24 hr tablet metoprolol succinate ER 50 mg tablet,extended release 24 hr   TAKE 1 TABLET BY MOUTH ONCE DAILY \"PT MUST BE SEEN FOR ADDITIONAL REFILS\"     • montelukast (SINGULAIR) 10 MG tablet Take 1 tablet by mouth Every Night.     • lisdexamfetamine (Vyvanse) 40 MG capsule Take 1 capsule by mouth Every Morning 30 capsule 0     No current facility-administered medications for this visit.       Mental Status Exam:   Hygiene:   good  Cooperation:  Cooperative  Eye Contact:  Good  Psychomotor Behavior:  Appropriate  Affect:  Appropriate  Mood: normal  Speech:  Normal  Thought Process:  Goal directed and Linear  Thought Content:  Normal and Mood congruent  Suicidal:  None  Homicidal:  None  Hallucinations:  None  Delusion:  None  Memory:  Intact  Orientation:  Person, Place, Time and Situation  Reliability:  good  Insight:  Good  Judgement:  Good  Impulse Control:  Good  Physical/Medical Issues:  No      Objective   Vital Signs:   Ht 160 cm (63\")   Wt 93.4 kg (206 lb)   BMI 36.49 kg/m²       Assessment / Plan    Diagnoses and all orders for this visit:    1. Attention deficit hyperactivity disorder (ADHD), combined type, mild (Primary)  -     lisdexamfetamine (Vyvanse) 40 MG capsule; Take 1 capsule by mouth Every Morning  Dispense: 30 capsule; Refill: 0    2. Generalized anxiety disorder  -     buPROPion XL (WELLBUTRIN XL) 300 MG 24 hr tablet; Take 1 tablet by mouth Every Morning.  Dispense: 30 tablet; Refill: 1  -     escitalopram (LEXAPRO) 10 MG tablet; Take 1 tablet by mouth Daily.  Dispense: 30 tablet; Refill: 1  -     busPIRone (BUSPAR) 7.5 MG tablet; Take 1 tablet by mouth 2 (Two) Times a Day As Needed (anxiety).  Dispense: 60 tablet; Refill: 1    3. Major depressive disorder, recurrent episode, moderate  -     buPROPion XL (WELLBUTRIN XL) 300 MG 24 hr tablet; Take 1 tablet by mouth Every Morning.  Dispense: 30 tablet; Refill: " 1  -     escitalopram (LEXAPRO) 10 MG tablet; Take 1 tablet by mouth Daily.  Dispense: 30 tablet; Refill: 1       Will discontinue Adderall and switch to Vyvanse. Continue all other medication as ordered.     A psychological evaluation was conducted in order to assess past and current level of functioning. Areas assessed included, but were not limited to: perception of social support, perception of ability to face and deal with challenges in life (positive functioning), anxiety symptoms, depressive symptoms, perspective on beliefs/belief system, coping skills for stress, intelligence level,  Therapeutic rapport was established. Interventions conducted today were geared towards incorporating medication management along with support for continued therapy. Education was also provided as to the med management with this provider and what to expect in subsequent sessions.      We discussed risks, benefits, goals and side effects of the above medication and the patient was agreeable with the plan. Patient was educated on the importance of compliance with treatment and follow-up appointments. Patient is aware to contact the [facility] with any worsening of symptoms. To call for questions or concerns and return early if necessary. Patent is agreeable to go to the Emergency Department or call 911 should they begin SI/HI.              MEDS ORDERED DURING VISIT:  New Medications Ordered This Visit   Medications   • lisdexamfetamine (Vyvanse) 40 MG capsule     Sig: Take 1 capsule by mouth Every Morning     Dispense:  30 capsule     Refill:  0   • buPROPion XL (WELLBUTRIN XL) 300 MG 24 hr tablet     Sig: Take 1 tablet by mouth Every Morning.     Dispense:  30 tablet     Refill:  1   • escitalopram (LEXAPRO) 10 MG tablet     Sig: Take 1 tablet by mouth Daily.     Dispense:  30 tablet     Refill:  1   • busPIRone (BUSPAR) 7.5 MG tablet     Sig: Take 1 tablet by mouth 2 (Two) Times a Day As Needed (anxiety).     Dispense:  60  tablet     Refill:  1         Follow Up   Return in about 4 weeks (around 5/17/2023).  Patient was given instructions and counseling regarding her condition or for health maintenance advice. Please see specific information pulled into the AVS if appropriate.     TREATMENT PLAN/GOALS: Continue supportive psychotherapy efforts and medications as indicated. Treatment and medication options discussed during today's visit. Patient acknowledged and verbally consented to continue with current treatment plan and was educated on the importance of compliance with treatment and follow-up appointments.    MEDICATION ISSUES:  Discussed medication options and treatment plan of prescribed medication as well as the risks, benefits, and side effects including potential falls, possible impaired driving and metabolic adversities among others. Patient is agreeable to call the office with any worsening of symptoms or onset of side effects. Patient is agreeable to call 911 or go to the nearest ER should he/she begin having SI/HI.        [facility] [address] [place] [id number] [phone]    April 19, 2023 14:57 EDT

## 2023-04-21 ENCOUNTER — TREATMENT (OUTPATIENT)
Dept: PHYSICAL THERAPY | Facility: CLINIC | Age: 58
End: 2023-04-21
Payer: COMMERCIAL

## 2023-04-21 DIAGNOSIS — R41.841 COGNITIVE COMMUNICATION DEFICIT: Primary | ICD-10-CM

## 2023-04-21 PROCEDURE — 92507 TX SP LANG VOICE COMM INDIV: CPT | Performed by: SPEECH-LANGUAGE PATHOLOGIST

## 2023-04-21 NOTE — PROGRESS NOTES
Outpatient Speech Language Pathology   Adult Speech Language Cognitive Treatment Note  [address]        Patient Name: Naima Paul  : 1965  MRN: [id number]  Today's Date: 2023        Visit Date: 2023   Patient Active Problem List   Diagnosis   • Acute cerebral venous sinus thrombosis   • HTN (hypertension)   • Syncope and collapse   • Anxiety associated with depression   • Leukocytosis   • Personal history of COVID-19   • Left arm weakness   • Daily headache   • COVID   • Myopia   • Presbyopia   • Mixed anxiety and depressive disorder   • Venous thrombosis   • Obstructive sleep apnea syndrome   • Hyperlipidemia   • Hyperglycemia   • Cognitive communication disorder   • Other headache syndrome        Past Medical History:   Diagnosis Date   • Anemia    • Anxiety    • CTS (carpal tunnel syndrome) 1994   • Depression    • Diabetes    • Headache, migraine    • Hypertension         Past Surgical History:   Procedure Laterality Date   • ADENOIDECTOMY     • CHOLECYSTECTOMY     • COLONOSCOPY     • DIAGNOSTIC LAPAROSCOPY     • ENDOMETRIAL ABLATION     • TONSILLECTOMY           Visit Dx:    ICD-10-CM ICD-9-CM   1. Cognitive communication deficit  R41.841 799.52            OP SLP Assessment/Plan -        SLP Assessment    Functional Problems Speech Language- Adult/Cognition  -RB    Impact on Function: Adult Speech Language/Cognition Restrictions in personal and social life;Difficulty sequencing thoughts to express complex messages  -RB    Clinical Impression: Speech Language-Adult/Congnition Mild:;Cognitive Communication Impairment  -RB    Functional Problems Comment Pt experiences difficulty participating in social situations due to difficulty with word finding and memory  -RB    Clinical Impression Comments Pt responsive to tx and sx presented today; pt applying WF strategies at home and reports good family response to participate with strategies -RB    Please refer to paper survey for  "additional self-reported information Yes  -RB    Please refer to items scanned into chart for additional diagnostic informaiton and handouts as provided by clinician Yes  -RB    SLP Diagnosis Cognitive Communication Impairment  -RB    Prognosis Good (comment)  -RB    Patient/caregiver participated in establishment of treatment plan and goals Yes  -RB    Patient would benefit from skilled therapy intervention Yes  -RB       SLP Plan    Frequency 1x weekly  -RB    Duration 6 weeks  -RB    Planned CPT's? SLP INDIVIDUAL SPEECH THERAPY: 97630  -RB    Expected Duration of Therapy Session (SLP Eval) 45  -RB    Plan Comments continue POC  -RB          User Key  (r) = Recorded By, (t) = Taken By, (c) = Cosigned By    Initials Name Provider Type    RB Alee Wong, SLP Speech and Language Pathologist                Pain: 0  Subjective: \"good\"           ACTIVITY  ACCURACY ASSISTANCE NEEDED   LTGs:   Pt and family will implement compensatory strategies to maximize patient’s memory function so patient can continue to participate in daily activities at 90% no cues     Pt will be able to remember information needed to participate in avocational activities at 90% accuracy no cues   Targeted internal and external memory strategies                    Targeted summarization and word finding 95%                        90% No cues                        No cues   STG:  Pt’s memory skills will be enhanced as reported by patient by utilizing internal memory strategies to recall up to 3-5 pieces of information after a 5 minute delay   Modeled/targeted association and grouping Name and occupation recall: 8/8 5 min delay no cues        To do list recall: 5/5 5-10 min delay no cues No cues       STG:   Pt will utilize word finding strategies at 90% accuracy and no cues    Targeted before during and after conversation    Pt said strategies are helping at home 90% No cues   STG:  Pt will demonstrate improved ability to recall information by " listening/reading functional information and answering  questions/ summarizing information at 90% accuracy and no cues Targeted visual and auditory recall Article recall: 95%  Pod cast recall: 90% No cues   STG:   Pt will utilize external memory aids for recall and thought organization with no cues at 90% Modeled note taking       Certification: 2/23/23-5/22/23         OP SLP Education     Row Name        Education    Barriers to Learning No barriers identified  -RB    Education Provided Described results of evaluation;Patient expressed understanding of evaluation;Patient participated in establishing goals and treatment plan  -RB    Assessed Learning needs;Learning motivation;Learning preferences;Learning readiness  -RB    Learning Motivation Strong  -RB    Learning Method Explanation;Demonstration;Teach back;Written materials  -RB    Teaching Response Verbalized understanding;Demonstrated understanding  -RB    Education Comments HEP: visualization -RB          User Key  (r) = Recorded By, (t) = Taken By, (c) = Cosigned By    Initials Name Effective Dates    Alee Sheridan, SLP 06/16/21 -             ASHLEY Campbell , provided therapy with my direct supervision.        Alee Wong MA CCC-SLP [facility]  [place] license: [id number]      4/21/2023

## 2023-04-24 ENCOUNTER — TELEPHONE (OUTPATIENT)
Dept: FAMILY MEDICINE CLINIC | Facility: CLINIC | Age: 58
End: 2023-04-24
Payer: COMMERCIAL

## 2023-04-24 NOTE — TELEPHONE ENCOUNTER
Patient called and said her insurance will not approve Vyvanse, and isn't sure why. Please advise.

## 2023-04-26 ENCOUNTER — PRIOR AUTHORIZATION (OUTPATIENT)
Dept: BEHAVIORAL HEALTH | Facility: CLINIC | Age: 58
End: 2023-04-26
Payer: COMMERCIAL

## 2023-04-26 NOTE — TELEPHONE ENCOUNTER
The prior authorization for Vyvanse 40MG capsules has been approved.    Start Date:03/26/2023;Coverage End Date:04/24/2024    CaseId:[id number]     Key: [id number]

## 2023-04-27 ENCOUNTER — TELEPHONE (OUTPATIENT)
Dept: PHYSICAL THERAPY | Facility: CLINIC | Age: 58
End: 2023-04-27

## 2023-05-04 ENCOUNTER — TREATMENT (OUTPATIENT)
Dept: PHYSICAL THERAPY | Facility: CLINIC | Age: 58
End: 2023-05-04
Payer: COMMERCIAL

## 2023-05-04 DIAGNOSIS — R41.841 COGNITIVE COMMUNICATION DEFICIT: Primary | ICD-10-CM

## 2023-05-04 PROCEDURE — 92507 TX SP LANG VOICE COMM INDIV: CPT | Performed by: SPEECH-LANGUAGE PATHOLOGIST

## 2023-05-04 NOTE — PROGRESS NOTES
I called and informed the pt Outpatient Speech Language Pathology   Adult Speech Language Cognitive Progress Note  [address]        Patient Name: Naima Paul  : 1965  MRN: [id number]  Today's Date: 2023        Visit Date: 2023   Patient Active Problem List   Diagnosis   • Acute cerebral venous sinus thrombosis   • HTN (hypertension)   • Syncope and collapse   • Anxiety associated with depression   • Leukocytosis   • Personal history of COVID-19   • Left arm weakness   • Daily headache   • COVID   • Myopia   • Presbyopia   • Mixed anxiety and depressive disorder   • Venous thrombosis   • Obstructive sleep apnea syndrome   • Hyperlipidemia   • Hyperglycemia   • Cognitive communication disorder   • Other headache syndrome        Past Medical History:   Diagnosis Date   • Anemia    • Anxiety    • CTS (carpal tunnel syndrome) 1994   • Depression    • Diabetes    • Headache, migraine    • Hypertension         Past Surgical History:   Procedure Laterality Date   • ADENOIDECTOMY     • CHOLECYSTECTOMY     • COLONOSCOPY     • DIAGNOSTIC LAPAROSCOPY     • ENDOMETRIAL ABLATION     • TONSILLECTOMY           Visit Dx:    ICD-10-CM ICD-9-CM   1. Cognitive communication deficit  R41.841 799.52            OP SLP Assessment/Plan -        SLP Assessment    Functional Problems Speech Language- Adult/Cognition  -RB    Impact on Function: Adult Speech Language/Cognition Restrictions in personal and social life;Difficulty sequencing thoughts to express complex messages  -RB    Clinical Impression: Speech Language-Adult/Congnition Mild:;Cognitive Communication Impairment  -RB    Functional Problems Comment Pt experiences difficulty participating in social situations due to difficulty with word finding and memory  -RB    Clinical Impression Comments Pt responsive to tx and sx presented today; pt identified areas of difficulty in home management and was open to discussing strategies to address these concerns -RB    Please refer to paper  "survey for additional self-reported information Yes  -RB    Please refer to items scanned into chart for additional diagnostic informaiton and handouts as provided by clinician Yes  -RB    SLP Diagnosis Cognitive Communication Impairment  -RB    Prognosis Good (comment)  -RB    Patient/caregiver participated in establishment of treatment plan and goals Yes  -RB    Patient would benefit from skilled therapy intervention Yes  -RB       SLP Plan    Frequency 1x weekly  -RB    Duration 5 weeks  -RB    Planned CPT's? SLP INDIVIDUAL SPEECH THERAPY: 58478  -RB    Expected Duration of Therapy Session (SLP Eval) 45  -RB    Plan Comments continue POC  -RB          User Key  (r) = Recorded By, (t) = Taken By, (c) = Cosigned By    Initials Name Provider Type    Alee Sheridan, SLP Speech and Language Pathologist                Pain: 0  Subjective: Pt states that she has had a \"rough\" week. She says that she has had increased stress, reduced sleep, and has been feeling \"pressure\" in her head near the sight of her CVA, discussed talking about this with MD           ACTIVITY  ACCURACY ASSISTANCE NEEDED   LTGs:   Pt and family will implement compensatory strategies to maximize patient’s memory function so patient can continue to participate in daily activities at 90% no cues     Pt will be able to remember information needed to participate in avocational activities at 90% accuracy no cues   Targeted internal and external memory strategies                    Targeted summarization and word finding 95%                        90% No cues                        No cues   STG:  Pt’s memory skills will be enhanced as reported by patient by utilizing internal memory strategies to recall up to 3-5 pieces of information after a 5 minute delay   Targeted association, grouping, and visualization Name and occupation recall: 8/8 5 min delay no cues     No cues       STG:   Pt will utilize word finding strategies at 90% accuracy and no " cues    Targeted before during and after conversation    Pt said WF has been worse this week in response to increased stress, reduced sleep, and physical discomfort 95% No cues   STG:  Pt will demonstrate improved ability to recall information by listening/reading functional information and answering  questions/ summarizing information at 90% accuracy and no cues Targeted visual recall  Discussed impact of stress and sleep hygiene on cognition Article recall: 95%   No cues   STG:   Pt will utilize external memory aids for recall and thought organization with no cues at 90% Modeled and discussed routines and organization; discussed visual aids and environmental arrangement to assist with functional daily tasks     STG:   Pt will improve executive functioning skills by identifying the tasks and problems where impairments interfere NEW       Certification: 2/23/23-5/22/23         OP SLP Education     Row Name        Education    Barriers to Learning No barriers identified  -RB    Education Provided Described results of evaluation;Patient expressed understanding of evaluation;Patient participated in establishing goals and treatment plan  -RB    Assessed Learning needs;Learning motivation;Learning preferences;Learning readiness  -RB    Learning Motivation Strong  -RB    Learning Method Explanation;Demonstration;Teach back;Written materials  -RB    Teaching Response Verbalized understanding;Demonstrated understanding  -RB    Education Comments HEP: Routines and organization -RB          User Key  (r) = Recorded By, (t) = Taken By, (c) = Cosigned By    Initials Name Effective Dates    Alee Sheridan, SLP 06/16/21 -             ASHLEY Campbell , provided therapy with my direct supervision.        Alee Wong MA, CCC-SLP [facility] license: [id number]      5/4/2023

## 2023-05-15 ENCOUNTER — OFFICE VISIT (OUTPATIENT)
Dept: BEHAVIORAL HEALTH | Facility: CLINIC | Age: 58
End: 2023-05-15
Payer: COMMERCIAL

## 2023-05-15 VITALS — HEIGHT: 63 IN | BODY MASS INDEX: 36.37 KG/M2 | WEIGHT: 205.25 LBS

## 2023-05-15 DIAGNOSIS — F33.1 MAJOR DEPRESSIVE DISORDER, RECURRENT EPISODE, MODERATE: ICD-10-CM

## 2023-05-15 DIAGNOSIS — F90.2 ATTENTION DEFICIT HYPERACTIVITY DISORDER (ADHD), COMBINED TYPE, MILD: Primary | ICD-10-CM

## 2023-05-15 DIAGNOSIS — F41.1 GENERALIZED ANXIETY DISORDER: ICD-10-CM

## 2023-05-15 PROCEDURE — 99214 OFFICE O/P EST MOD 30 MIN: CPT

## 2023-05-15 RX ORDER — DEXTROAMPHETAMINE SACCHARATE, AMPHETAMINE ASPARTATE, DEXTROAMPHETAMINE SULFATE AND AMPHETAMINE SULFATE 5; 5; 5; 5 MG/1; MG/1; MG/1; MG/1
20 TABLET ORAL DAILY
Qty: 30 TABLET | Refills: 0 | Status: SHIPPED | OUTPATIENT
Start: 2023-05-15

## 2023-05-15 RX ORDER — BUSPIRONE HYDROCHLORIDE 7.5 MG/1
7.5 TABLET ORAL 2 TIMES DAILY PRN
Qty: 60 TABLET | Refills: 1 | Status: SHIPPED | OUTPATIENT
Start: 2023-05-15

## 2023-05-15 RX ORDER — ESCITALOPRAM OXALATE 20 MG/1
20 TABLET ORAL DAILY
Qty: 30 TABLET | Refills: 1 | Status: SHIPPED | OUTPATIENT
Start: 2023-05-15

## 2023-05-15 RX ORDER — BUPROPION HYDROCHLORIDE 300 MG/1
300 TABLET ORAL EVERY MORNING
Qty: 30 TABLET | Refills: 1 | Status: SHIPPED | OUTPATIENT
Start: 2023-05-15

## 2023-05-15 NOTE — PROGRESS NOTES
Follow Up Office Visit    Patient Name: Naima Paul  : 1965   MRN: [id number]   Care Team: Patient Care Team:  Rubi Jackson PA as PCP - General (Physician Assistant)  Carol Rao APRN as Nurse Practitioner (Behavioral Health)        Chief Complaint:    Chief Complaint   Patient presents with   • ADHD   • Anxiety   • Depression   • Med Management       History of Present Illness: Naima Paul is a 58 y.o. female who is here today for a medication management follow up. Patient reports that things have not been going well. She stopped taking the Vyvanse because she did not like the way it made her feel. She endorses brain fog and irritability while taking it. She states her mood has not been doing well either. She cries often for no reason and seems very agitated and short tempered. She does endorse situational stressors that have been impacting her mental health as well. She denies SI/HI today but does feel that things would be easier if she could just run away.     The following portion of the patient's history were reviewed and updated appropriately: allergies, current and past medications, family history, medical history and social history.  Subjective   Review of Systems:    Review of Systems   Psychiatric/Behavioral: Positive for agitation, decreased concentration, sleep disturbance, depressed mood and stress. The patient is nervous/anxious.    All other systems reviewed and are negative.      Current Medications:   Current Outpatient Medications   Medication Sig Dispense Refill   • apixaban (ELIQUIS) 5 MG tablet tablet Take 1 tablet by mouth Every 12 (Twelve) Hours. Indications: Other - full anticoagulation 180 tablet 3   • buPROPion XL (WELLBUTRIN XL) 300 MG 24 hr tablet Take 1 tablet by mouth Every Morning. 30 tablet 1   • busPIRone (BUSPAR) 7.5 MG tablet Take 1 tablet by mouth 2 (Two) Times a Day As Needed (anxiety). 60 tablet 1   • Cholecalciferol (VITAMIN D3) 50 MCG ( UT)  "capsule Take 1 capsule by mouth Daily.     • loratadine (CLARITIN) 10 MG tablet Take 1 tablet by mouth Daily.     • metoprolol succinate XL (TOPROL-XL) 50 MG 24 hr tablet metoprolol succinate ER 50 mg tablet,extended release 24 hr   TAKE 1 TABLET BY MOUTH ONCE DAILY \"PT MUST BE SEEN FOR ADDITIONAL REFILS\"     • montelukast (SINGULAIR) 10 MG tablet Take 1 tablet by mouth Every Night.     • amphetamine-dextroamphetamine (Adderall) 20 MG tablet Take 1 tablet by mouth Daily. 30 tablet 0   • escitalopram (Lexapro) 20 MG tablet Take 1 tablet by mouth Daily. 30 tablet 1   • Methylphenidate HCl ER, PM, 40 MG capsule sustained-release 24 hr Take 40 mg by mouth Every Night. 30 capsule 0     No current facility-administered medications for this visit.       Mental Status Exam:   Hygiene:   good  Cooperation:  Cooperative  Eye Contact:  Good  Psychomotor Behavior:  Appropriate  Affect:  Appropriate  Mood: depressed, anxious and irritable  Speech:  Normal  Thought Process:  Goal directed and Linear  Thought Content:  Normal and Mood congruent  Suicidal:  None  Homicidal:  None  Hallucinations:  None  Delusion:  None  Memory:  Intact  Orientation:  Person, Place, Time and Situation  Reliability:  good  Insight:  Good  Judgement:  Good  Impulse Control:  Good  Physical/Medical Issues:  Yes see chart     Objective   Vital Signs:   Ht 160 cm (62.99\")   Wt 93.1 kg (205 lb 4 oz)   BMI 36.37 kg/m²       Assessment / Plan    Diagnoses and all orders for this visit:    1. Attention deficit hyperactivity disorder (ADHD), combined type, mild (Primary)  -     Methylphenidate HCl ER, PM, 40 MG capsule sustained-release 24 hr; Take 40 mg by mouth Every Night.  Dispense: 30 capsule; Refill: 0  -     amphetamine-dextroamphetamine (Adderall) 20 MG tablet; Take 1 tablet by mouth Daily.  Dispense: 30 tablet; Refill: 0    2. Generalized anxiety disorder  -     escitalopram (Lexapro) 20 MG tablet; Take 1 tablet by mouth Daily.  Dispense: 30 " tablet; Refill: 1  -     buPROPion XL (WELLBUTRIN XL) 300 MG 24 hr tablet; Take 1 tablet by mouth Every Morning.  Dispense: 30 tablet; Refill: 1  -     busPIRone (BUSPAR) 7.5 MG tablet; Take 1 tablet by mouth 2 (Two) Times a Day As Needed (anxiety).  Dispense: 60 tablet; Refill: 1    3. Major depressive disorder, recurrent episode, moderate  -     escitalopram (Lexapro) 20 MG tablet; Take 1 tablet by mouth Daily.  Dispense: 30 tablet; Refill: 1  -     buPROPion XL (WELLBUTRIN XL) 300 MG 24 hr tablet; Take 1 tablet by mouth Every Morning.  Dispense: 30 tablet; Refill: 1       Will discontinue Vyvanse and start Jornay with an afternoon booster if needed. Will increase Lexapro and continue Buspar and Wellbutrin as ordered.     A psychological evaluation was conducted in order to assess past and current level of functioning. Areas assessed included, but were not limited to: perception of social support, perception of ability to face and deal with challenges in life (positive functioning), anxiety symptoms, depressive symptoms, perspective on beliefs/belief system, coping skills for stress, intelligence level,  Therapeutic rapport was established. Interventions conducted today were geared towards incorporating medication management along with support for continued therapy. Education was also provided as to the med management with this provider and what to expect in subsequent sessions.      We discussed risks, benefits, goals and side effects of the above medication and the patient was agreeable with the plan. Patient was educated on the importance of compliance with treatment and follow-up appointments. Patient is aware to contact the [facility] with any worsening of symptoms. To call for questions or concerns and return early if necessary. Patent is agreeable to go to the Emergency Department or call 911 should they begin SI/HI.      PHQ-2/PHQ-9: Depression Screening  Little Interest or Pleasure in Doing Things:  2-->more than half the days  Feeling Down, Depressed or Hopeless: 2-->more than half the days  PHQ-2 Total Score: 4  Trouble Falling or Staying Asleep, or Sleeping Too Much: 1-->several days  Feeling Tired or Having Little Energy: 3-->nearly every day  Poor Appetite or Overeatin-->not at all  Feeling Bad about Yourself - or that You are a Failure or Have Let Yourself or Your Family Down: 1-->several days  Trouble Concentrating on Things, Such as Reading the Newspaper or Watching Television: 3-->nearly every day  Moving or Speaking So Slowly that Other People Could Have Noticed? Or the Opposite - Being So Fidgety: 1-->several days  Thoughts that You Would be Better Off Dead or of Hurting Yourself in Some Way: 0-->not at all  PHQ-9: Brief Depression Severity Measure Score: 13  If You Checked Off Any Problems, How Difficult Have These Problems Made It For You to Do Your Work, Take Care of Things at Home, or Get Along with Other People?: somewhat difficult      PHQ-9 Score:   PHQ-9 Total Score: 13    Depression Screening:  Patient screened positive for depression based on a PHQ-9 score of 13 on 5/15/2023. Follow-up recommendations include: Prescribed antidepressant medication treatment and Suicide Risk Assessment performed.      Over the last two weeks, how often have you been bothered by the following problems?  Feeling nervous, anxious or on edge: More than half the days  Not being able to stop or control worrying: Several days  Worrying too much about different things: Not at all  Trouble Relaxing: More than half the days  Being so restless that it is hard to sit still: Several days  Becoming easily annoyed or irritable: Nearly every day  Feeling afraid as if something awful might happen: Not at all  SAMIA 7 Total Score: 9  If you checked any problems, how difficult have these problems made it for you to do your work, take care of things at home, or get along with other people: Somewhat difficult        Screening  for Adults With ADHD - (1-6)  1. How often do you have trouble wrapping up the final details of a project, once the challenging parts have been done?: Sometimes  2. How often do you have difficulty getting things in order when you have to do a task that requires organization?: Very Often  3. How often do you have problems remembering appointments or obligations : Often  4. When you have a task that requires a lot of thought, how often do you avoid or delay getting started ?: Often  5. How often do you fidget or squirm with your hands or feet when you have to sit down for a long time?: Sometimes  6. How often do you feel overly active and compelled to do things, like you were driven by a motor?: Rarely  7. How often do you make careless mistakes when you have to work on a boring or difficult project?: Rarely  8. How often do have difficulty keeping your attention when you are doing boring or repetitive work?: Sometimes  9. How often do you have difficulty concentrating on what people say to you, even when they are speaking to you: Often  10.How often do you misplace or have difficulty finding things at home or at work?: Very Often  11.How often are you distracted by activity or noise around you?: Often  12.How often do you leave your seat in meetings or other situations in which you are expected to remain seated?: Never  13.How often do you feel restless or fidgety?: Sometimes  14.How often do you have difficulty unwinding and relaxing when you have time to yourself?: Sometimes  15.How often do you find yourself talking too much when you are in social situations?: Sometimes  16.When you’re in a conversation, how often do you find yourself finishing the sentences of the people you are talking to, before they can finish them themselves?: Sometimes  17.How often do you have difficulty waiting your turn in situations when turn taking is required?: Never  18.How often do you interrupt others when they are busy?:  Sometimes        MEDS ORDERED DURING VISIT:  New Medications Ordered This Visit   Medications   • Methylphenidate HCl ER, PM, 40 MG capsule sustained-release 24 hr     Sig: Take 40 mg by mouth Every Night.     Dispense:  30 capsule     Refill:  0   • amphetamine-dextroamphetamine (Adderall) 20 MG tablet     Sig: Take 1 tablet by mouth Daily.     Dispense:  30 tablet     Refill:  0   • escitalopram (Lexapro) 20 MG tablet     Sig: Take 1 tablet by mouth Daily.     Dispense:  30 tablet     Refill:  1   • buPROPion XL (WELLBUTRIN XL) 300 MG 24 hr tablet     Sig: Take 1 tablet by mouth Every Morning.     Dispense:  30 tablet     Refill:  1   • busPIRone (BUSPAR) 7.5 MG tablet     Sig: Take 1 tablet by mouth 2 (Two) Times a Day As Needed (anxiety).     Dispense:  60 tablet     Refill:  1         Follow Up   Return in about 8 weeks (around 7/10/2023).  Patient was given instructions and counseling regarding her condition or for health maintenance advice. Please see specific information pulled into the AVS if appropriate.     TREATMENT PLAN/GOALS: Continue supportive psychotherapy efforts and medications as indicated. Treatment and medication options discussed during today's visit. Patient acknowledged and verbally consented to continue with current treatment plan and was educated on the importance of compliance with treatment and follow-up appointments.    MEDICATION ISSUES:  Discussed medication options and treatment plan of prescribed medication as well as the risks, benefits, and side effects including potential falls, possible impaired driving and metabolic adversities among others. Patient is agreeable to call the office with any worsening of symptoms or onset of side effects. Patient is agreeable to call 911 or go to the nearest ER should he/she begin having SI/HI.        [facility]  [address]  [id number]  [phone]    May 15, 2023 13:44 EDT

## 2023-05-18 ENCOUNTER — TREATMENT (OUTPATIENT)
Dept: PHYSICAL THERAPY | Facility: CLINIC | Age: 58
End: 2023-05-18
Payer: COMMERCIAL

## 2023-05-18 DIAGNOSIS — R41.841 COGNITIVE COMMUNICATION DEFICIT: Primary | ICD-10-CM

## 2023-05-18 PROCEDURE — 92507 TX SP LANG VOICE COMM INDIV: CPT | Performed by: SPEECH-LANGUAGE PATHOLOGIST

## 2023-05-18 NOTE — PROGRESS NOTES
Outpatient Speech Language Pathology   Adult Speech Language Cognitive Treatment Note  [address]        Patient Name: Naima Paul  : 1965  MRN: [id number]  Today's Date: 2023        Visit Date: 2023   Patient Active Problem List   Diagnosis   • Acute cerebral venous sinus thrombosis   • HTN (hypertension)   • Syncope and collapse   • Anxiety associated with depression   • Leukocytosis   • Personal history of COVID-19   • Left arm weakness   • Daily headache   • COVID   • Myopia   • Presbyopia   • Mixed anxiety and depressive disorder   • Venous thrombosis   • Obstructive sleep apnea syndrome   • Hyperlipidemia   • Hyperglycemia   • Cognitive communication disorder   • Other headache syndrome        Past Medical History:   Diagnosis Date   • Anemia    • Anxiety    • CTS (carpal tunnel syndrome) 1994   • Depression    • Diabetes    • Headache, migraine    • Hypertension         Past Surgical History:   Procedure Laterality Date   • ADENOIDECTOMY     • CHOLECYSTECTOMY     • COLONOSCOPY     • DIAGNOSTIC LAPAROSCOPY     • ENDOMETRIAL ABLATION     • TONSILLECTOMY           Visit Dx:    ICD-10-CM ICD-9-CM   1. Cognitive communication deficit  R41.841 799.52            OP SLP Assessment/Plan -        SLP Assessment    Functional Problems Speech Language- Adult/Cognition  -RB    Impact on Function: Adult Speech Language/Cognition Restrictions in personal and social life;Difficulty sequencing thoughts to express complex messages  -RB    Clinical Impression: Speech Language-Adult/Congnition Mild:;Cognitive Communication Impairment  -RB    Functional Problems Comment Pt experiences difficulty participating in social situations due to difficulty with word finding and memory  -RB    Clinical Impression Comments Pt responsive to tx and sx presented today; pt participating in HEP -RB    Please refer to paper survey for additional self-reported information Yes  -RB    Please refer to items scanned  into chart for additional diagnostic informaiton and handouts as provided by clinician Yes  -RB    SLP Diagnosis Cognitive Communication Impairment  -RB    Prognosis Good (comment)  -RB    Patient/caregiver participated in establishment of treatment plan and goals Yes  -RB    Patient would benefit from skilled therapy intervention Yes  -RB       SLP Plan    Frequency 1x weekly  -RB    Duration 4 weeks  -RB    Planned CPT's? SLP INDIVIDUAL SPEECH THERAPY: 07282  -RB    Expected Duration of Therapy Session (SLP Eval) 45  -RB    Plan Comments continue POC  -RB          User Key  (r) = Recorded By, (t) = Taken By, (c) = Cosigned By    Initials Name Provider Type    Alee Sheridan, SLP Speech and Language Pathologist                Pain: 0  Subjective: Pt reports having difficulty with paraphasias in conversation.            ACTIVITY  ACCURACY ASSISTANCE NEEDED   LTGs:   Pt and family will implement compensatory strategies to maximize patient’s memory function so patient can continue to participate in daily activities at 90% no cues     Pt will be able to remember information needed to participate in avocational activities at 90% accuracy no cues   Targeted internal and external memory strategies, executive functioning strategies                    Targeted external aid use  95%                        90% No cues                        No cues   STG:  Pt’s memory skills will be enhanced as reported by patient by utilizing internal memory strategies to recall up to 3-5 pieces of information after a 5 minute delay   Not targeted             STG:   Pt will utilize word finding strategies at 90% accuracy and no cues    Educated on paraphasais and provided handout with visuals     STG:  Pt will demonstrate improved ability to recall information by listening/reading functional information and answering  questions/ summarizing information at 90% accuracy and no cues Not targeted       STG:   Pt will utilize external memory  aids for recall and thought organization with no cues at 90% Modeled and discussed external aides for initiation      STG:   Pt will improve executive functioning skills by identifying the tasks and problems where impairments interfere Modeled energy envelopes, spoon theory, budgeting and pacing strategies       Certification: 2/23/23-5/22/23         OP SLP Education     Row Name        Education    Barriers to Learning No barriers identified  -RB    Education Provided Described results of evaluation;Patient expressed understanding of evaluation;Patient participated in establishing goals and treatment plan  -RB    Assessed Learning needs;Learning motivation;Learning preferences;Learning readiness  -RB    Learning Motivation Strong  -RB    Learning Method Explanation;Demonstration;Teach back;Written materials  -RB    Teaching Response Verbalized understanding;Demonstrated understanding  -RB    Education Comments HEP: pacing, energy management (energy envelopes, budgeting, spoon theory), paraphasias -RB          User Key  (r) = Recorded By, (t) = Taken By, (c) = Cosigned By    Initials Name Effective Dates    Alee Sheridan SLP 06/16/21 -                     Alee Wong MA CCC-SLP [facility]  [place] license: [id number]      5/18/2023

## 2023-05-23 ENCOUNTER — TREATMENT (OUTPATIENT)
Dept: PHYSICAL THERAPY | Facility: CLINIC | Age: 58
End: 2023-05-23
Payer: COMMERCIAL

## 2023-05-23 DIAGNOSIS — F90.2 ATTENTION DEFICIT HYPERACTIVITY DISORDER (ADHD), COMBINED TYPE, MILD: ICD-10-CM

## 2023-05-23 DIAGNOSIS — R41.841 COGNITIVE COMMUNICATION DEFICIT: Primary | ICD-10-CM

## 2023-05-23 PROCEDURE — 92507 TX SP LANG VOICE COMM INDIV: CPT | Performed by: SPEECH-LANGUAGE PATHOLOGIST

## 2023-05-23 RX ORDER — METHYLPHENIDATE HYDROCHLORIDE 40 MG/1
40 CAPSULE ORAL NIGHTLY
Qty: 30 CAPSULE | Refills: 0 | Status: SHIPPED | OUTPATIENT
Start: 2023-05-23

## 2023-05-23 NOTE — TELEPHONE ENCOUNTER
PATIENTS INSURANCE IS NOT WANTING TO PAY FOR THIS MEDICATION CAN SOMEONE CALL HER BACK ABOUT THIS?

## 2023-05-23 NOTE — PROGRESS NOTES
Outpatient Speech Language Pathology   Adult Speech Language Cognitive Reassessment  [address]        Patient Name: Naima Paul  : 1965  MRN: [id number]  Today's Date: 2023        Visit Date: 2023   Patient Active Problem List   Diagnosis   • Acute cerebral venous sinus thrombosis   • HTN (hypertension)   • Syncope and collapse   • Anxiety associated with depression   • Leukocytosis   • Personal history of COVID-19   • Left arm weakness   • Daily headache   • COVID   • Myopia   • Presbyopia   • Mixed anxiety and depressive disorder   • Venous thrombosis   • Obstructive sleep apnea syndrome   • Hyperlipidemia   • Hyperglycemia   • Cognitive communication disorder   • Other headache syndrome        Past Medical History:   Diagnosis Date   • Anemia    • Anxiety    • CTS (carpal tunnel syndrome) 1994   • Depression    • Diabetes    • Headache, migraine    • Hypertension         Past Surgical History:   Procedure Laterality Date   • ADENOIDECTOMY     • CHOLECYSTECTOMY     • COLONOSCOPY     • DIAGNOSTIC LAPAROSCOPY     • ENDOMETRIAL ABLATION     • TONSILLECTOMY           Visit Dx:    ICD-10-CM ICD-9-CM   1. Cognitive communication deficit  R41.841 799.52            OP SLP Assessment/Plan -        SLP Assessment    Functional Problems Speech Language- Adult/Cognition  -RB    Impact on Function: Adult Speech Language/Cognition Restrictions in personal and social life;Difficulty sequencing thoughts to express complex messages  -RB    Clinical Impression: Speech Language-Adult/Congnition Mild:;Cognitive Communication Impairment  -RB    Functional Problems Comment Pt experiences difficulty participating in social situations due to difficulty with word finding and memory  -RB    Clinical Impression Comments Pt responsive to tx ; pt participating in HEP, having difficulty with time management -RB    Please refer to paper survey for additional self-reported information Yes  -RB    Please refer to  items scanned into chart for additional diagnostic informaiton and handouts as provided by clinician Yes  -RB    SLP Diagnosis Cognitive Communication Impairment  -RB    Prognosis Good (comment)  -RB    Patient/caregiver participated in establishment of treatment plan and goals Yes  -RB    Patient would benefit from skilled therapy intervention Yes  -RB       SLP Plan    Frequency 1x weekly  -RB    Duration 3 weeks  -RB    Planned CPT's? SLP INDIVIDUAL SPEECH THERAPY: 42543  -RB    Expected Duration of Therapy Session (SLP Eval) 45  -RB    Plan Comments continue POC  -RB          User Key  (r) = Recorded By, (t) = Taken By, (c) = Cosigned By    Initials Name Provider Type    RB Alee Wong, SLP Speech and Language Pathologist                 SLP [facility] Evaluation - 05/23/23 1100        Cognitive Assessment Intervention- SLP    Cognitive Function (Cognition) mild impairment  -RB    Orientation Status (Cognition) awareness of basic personal information;person;place;time;situation;WFL  -RB    Memory (Cognitive) mild impairment;immediate;functional;short-term;delayed;auditory;visual  -RB    Attention (Cognitive) WFL;sustained;mild impairment;attention to detail;distracting environment;alternating;divided;selective  -RB    Thought Organization (Cognitive) mild impairment;concrete divergent  -RB    Reasoning (Cognitive) WFL;simple  -RB    Problem Solving (Cognitive) WFL;simple  -RB    Functional Math (Cognitive) WFL;simple  -RB    Executive Function (Cognition) WFL;deficit awareness;judgement;mild impairment;complex organization;home management activities;planning;time management;initiation  -RB    Pragmatics (Communication) WFL  -RB    Right Hemisphere Function WFL  -RB    Cognition, Comment making gains with recall and short term memory, having increased diffiuclty with executive functioning/time management  -RB          User Key  (r) = Recorded By, (t) = Taken By, (c) = Cosigned By    Initials Name Provider Type    RB  Alee Wong, SLP Speech and Language Pathologist            Pain: 0  Subjective: Pt reports having difficulty with time management, arrived 17 minutes late today           ACTIVITY  ACCURACY ASSISTANCE NEEDED   LTGs:   Pt and family will implement compensatory strategies to maximize patient’s memory function so patient can continue to participate in daily activities at 90% no cues     Pt will be able to remember information needed to participate in avocational activities at 90% accuracy no cues   Targeted internal and external memory strategies, executive functioning strategies                    Targeted external aid use  95%                        90% No cues                        No cues   STG:  Pt’s memory skills will be enhanced as reported by patient by utilizing internal memory strategies to recall up to 3-5 pieces of information after a 5 minute delay   Targeted association and grouping Name recall: 4/4 5 min delay  Grocery list recall: 5/5 5 min delay   No cues       STG:   Pt will utilize word finding strategies at 90% accuracy and no cues    Not targeted     STG:  Pt will demonstrate improved ability to recall information by listening/reading functional information and answering  questions/ summarizing information at 90% accuracy and no cues Targeted visual recall tasks Article recall x2: 95%   No cues   STG:   Pt will utilize external memory aids for recall and thought organization with no cues at 90% Discussed journaling and time estimation strategies/visual     STG:   Pt will improve executive functioning skills by identifying the tasks and problems where impairments interfere at 95% no cues Modeled time estimation strategies for time management        Certification: 2/23/23-5/22/23         OP SLP Education     Row Name        Education    Barriers to Learning No barriers identified  -RB    Education Provided Described results of evaluation;Patient expressed understanding of evaluation;Patient  participated in establishing goals and treatment plan  -RB    Assessed Learning needs;Learning motivation;Learning preferences;Learning readiness  -RB    Learning Motivation Strong  -RB    Learning Method Explanation;Demonstration;Teach back;Written materials  -RB    Teaching Response Verbalized understanding;Demonstrated understanding  -RB    Education Comments HEP: pacing, energy management (energy envelopes, budgeting, spoon theory), paraphasias -RB          User Key  (r) = Recorded By, (t) = Taken By, (c) = Cosigned By    Initials Name Effective Dates    Alee Sheridan, SLP 06/16/21 -                Based upon review of the patient's progress and continued therapy plan, it is my medical opinion that Naima Paul should continue speech treatment at [facility] PHYSICAL THERAPY  [address]  [facility] [address] [phone].     Signature: __________________________________    [facility] Speech Language Pathology   [address]  [place] [address]          PHYSICIAN: Carolin Swartz APRN    NPI: [id number]         I certify that the therapy services are furnished while this patient is under my care.  The services outlined above are required by this patient, and will be reviewed every 90 days.                PHYSICIAN:                                         DATE:      Please sign and return via fax to [phone].   Thank you,   [facility].    Alee Wong MA CCC-SLP [facility] license: [id number]      5/23/2023

## 2023-06-13 ENCOUNTER — TREATMENT (OUTPATIENT)
Dept: PHYSICAL THERAPY | Facility: CLINIC | Age: 58
End: 2023-06-13
Payer: COMMERCIAL

## 2023-06-13 DIAGNOSIS — R41.841 COGNITIVE COMMUNICATION DEFICIT: Primary | ICD-10-CM

## 2023-06-13 PROCEDURE — 92507 TX SP LANG VOICE COMM INDIV: CPT | Performed by: SPEECH-LANGUAGE PATHOLOGIST

## 2023-06-13 NOTE — PROGRESS NOTES
Outpatient Speech Language Pathology   Adult Speech Language Cognitive Treatment Note  [address]        Patient Name: Naima Paul  : 1965  MRN: [id number]  Today's Date: 2023        Visit Date: 2023   Patient Active Problem List   Diagnosis    Acute cerebral venous sinus thrombosis    HTN (hypertension)    Syncope and collapse    Anxiety associated with depression    Leukocytosis    Personal history of COVID-19    Left arm weakness    Daily headache    COVID    Myopia    Presbyopia    Mixed anxiety and depressive disorder    Venous thrombosis    Obstructive sleep apnea syndrome    Hyperlipidemia    Hyperglycemia    Cognitive communication disorder    Other headache syndrome        Past Medical History:   Diagnosis Date    Anemia     Anxiety     CTS (carpal tunnel syndrome) 1994    Depression     Diabetes     Headache, migraine     Hypertension         Past Surgical History:   Procedure Laterality Date    ADENOIDECTOMY      CHOLECYSTECTOMY      COLONOSCOPY  2016    DIAGNOSTIC LAPAROSCOPY      ENDOMETRIAL ABLATION      TONSILLECTOMY           Visit Dx:    ICD-10-CM ICD-9-CM   1. Cognitive communication deficit  R41.841 799.52            OP SLP Assessment/Plan -          SLP Assessment    Functional Problems Speech Language- Adult/Cognition  -RB    Impact on Function: Adult Speech Language/Cognition Restrictions in personal and social life;Difficulty sequencing thoughts to express complex messages  -RB    Clinical Impression: Speech Language-Adult/Congnition Mild:;Cognitive Communication Impairment  -RB    Functional Problems Comment Pt experiences difficulty participating in social situations due to difficulty with word finding and memory  -RB    Clinical Impression Comments Pt responsive to tx ; pt participating in HEP, pt reports progress-RB    Please refer to paper survey for additional self-reported information Yes  -RB    Please refer to items scanned into chart for additional  diagnostic informaiton and handouts as provided by clinician Yes  -RB    SLP Diagnosis Cognitive Communication Impairment  -RB    Prognosis Good (comment)  -RB    Patient/caregiver participated in establishment of treatment plan and goals Yes  -RB    Patient would benefit from skilled therapy intervention Yes  -RB       SLP Plan    Frequency 1x weekly  -RB    Duration 2 weeks  -RB    Planned CPT's? SLP INDIVIDUAL SPEECH THERAPY: 22686  -RB    Expected Duration of Therapy Session (SLP Eval) 45  -RB    Plan Comments continue POC  -RB              User Key  (r) = Recorded By, (t) = Taken By, (c) = Cosigned By      Initials Name Provider Type    Alee Sheridan SLP Speech and Language Pathologist                      Pain: 0  Subjective: PT reports she is doing better and feels less in a brain fog           ACTIVITY  ACCURACY ASSISTANCE NEEDED   LTGs:   Pt and family will implement compensatory strategies to maximize patient’s memory function so patient can continue to participate in daily activities at 90% no cues     Pt will be able to remember information needed to participate in avocational activities at 90% accuracy no cues   Targeted internal and external memory strategies, executive functioning strategies                    Targeted external aid use  95%                        90% No cues                        No cues   STG:  Pt’s memory skills will be enhanced as reported by patient by utilizing internal memory strategies to recall up to 3-5 pieces of information after a 5 minute delay   Targeted association and grouping Name recall: 4/4 5 min delay  To bring list recall: 5/5 5 min delay   No cues       STG:   Pt will utilize word finding strategies at 90% accuracy and no cues    Not targeted     STG:  Pt will demonstrate improved ability to recall information by listening/reading functional information and answering  questions/ summarizing information at 90% accuracy and no cues Targeted visual recall tasks  Article recall x2: 95%  Hotel bill recall: 100%  6 min podcast recall: 95%   No cues   STG:   Pt will utilize external memory aids for recall and thought organization with no cues at 90% Pt brought in time estimation visual filled out  Targeted note taking 90% No cues   STG:   Pt will improve executive functioning skills by identifying the tasks and problems where impairments interfere at 95% no cues Pt demonstrated use of time estimation visual for daily tasks, reports she was over estimating       Certification: 5/22/23-8/21/23         OP SLP Education       Row Name        Education    Barriers to Learning No barriers identified  -RB    Education Provided Described results of evaluation;Patient expressed understanding of evaluation;Patient participated in establishing goals and treatment plan  -RB    Assessed Learning needs;Learning motivation;Learning preferences;Learning readiness  -RB    Learning Motivation Strong  -RB    Learning Method Explanation;Demonstration;Teach back;Written materials  -RB    Teaching Response Verbalized understanding;Demonstrated understanding  -RB    Education Comments HEP: IM practice-RB              User Key  (r) = Recorded By, (t) = Taken By, (c) = Cosigned By      Initials Name Effective Dates    Alee Sheridan SLP 06/16/21 -                        Alee Wong MA CCC-SLP [facility] license: [id number]      6/13/2023

## 2023-07-25 DIAGNOSIS — F90.2 ATTENTION DEFICIT HYPERACTIVITY DISORDER (ADHD), COMBINED TYPE, MILD: ICD-10-CM

## 2023-07-25 RX ORDER — METHYLPHENIDATE HYDROCHLORIDE 40 MG/1
40 CAPSULE ORAL NIGHTLY
Qty: 30 CAPSULE | Refills: 0 | Status: SHIPPED | OUTPATIENT
Start: 2023-07-25

## 2023-09-03 DIAGNOSIS — F33.1 MAJOR DEPRESSIVE DISORDER, RECURRENT EPISODE, MODERATE: ICD-10-CM

## 2023-09-03 DIAGNOSIS — F41.1 GENERALIZED ANXIETY DISORDER: ICD-10-CM

## 2023-09-04 RX ORDER — BUPROPION HYDROCHLORIDE 300 MG/1
TABLET ORAL
Qty: 30 TABLET | Refills: 0 | Status: SHIPPED | OUTPATIENT
Start: 2023-09-04

## 2023-09-07 DIAGNOSIS — F90.2 ATTENTION DEFICIT HYPERACTIVITY DISORDER (ADHD), COMBINED TYPE, MILD: ICD-10-CM

## 2023-09-07 RX ORDER — METHYLPHENIDATE HYDROCHLORIDE 40 MG/1
40 CAPSULE ORAL NIGHTLY
Qty: 30 CAPSULE | Refills: 0 | Status: SHIPPED | OUTPATIENT
Start: 2023-09-07

## 2023-09-07 NOTE — TELEPHONE ENCOUNTER
Rx Refill Note  Requested Prescriptions     Pending Prescriptions Disp Refills    Methylphenidate HCl ER, PM, (Jornay PM) 40 MG capsule sustained-release 24 hr 30 capsule 0     Sig: Take 40 mg by mouth Every Night.      Last office visit with prescribing clinician: 7/12/2023   Last telemedicine visit with prescribing clinician: Visit date not found   Next office visit with prescribing clinician: 9/13/2023                         Would you like a call back once the refill request has been completed: [] Yes [] No    If the office needs to give you a call back, can they leave a voicemail: [] Yes [] No    Julia Perez MA  09/07/23, 10:58 EDT

## 2023-09-11 DIAGNOSIS — F33.1 MAJOR DEPRESSIVE DISORDER, RECURRENT EPISODE, MODERATE: ICD-10-CM

## 2023-09-11 DIAGNOSIS — F41.1 GENERALIZED ANXIETY DISORDER: ICD-10-CM

## 2023-09-11 RX ORDER — ESCITALOPRAM OXALATE 20 MG/1
TABLET ORAL
Qty: 30 TABLET | Refills: 0 | Status: SHIPPED | OUTPATIENT
Start: 2023-09-11 | End: 2023-09-13 | Stop reason: SDUPTHER

## 2023-09-13 ENCOUNTER — OFFICE VISIT (OUTPATIENT)
Dept: BEHAVIORAL HEALTH | Facility: CLINIC | Age: 58
End: 2023-09-13
Payer: COMMERCIAL

## 2023-09-13 DIAGNOSIS — F33.1 MAJOR DEPRESSIVE DISORDER, RECURRENT EPISODE, MODERATE: ICD-10-CM

## 2023-09-13 DIAGNOSIS — F90.2 ATTENTION DEFICIT HYPERACTIVITY DISORDER (ADHD), COMBINED TYPE, MILD: Primary | ICD-10-CM

## 2023-09-13 DIAGNOSIS — F41.1 GENERALIZED ANXIETY DISORDER: ICD-10-CM

## 2023-09-13 PROBLEM — D68.69: Status: ACTIVE | Noted: 2023-04-24

## 2023-09-13 PROCEDURE — 99214 OFFICE O/P EST MOD 30 MIN: CPT

## 2023-09-13 RX ORDER — ESCITALOPRAM OXALATE 20 MG/1
20 TABLET ORAL DAILY
Qty: 30 TABLET | Refills: 1 | Status: SHIPPED | OUTPATIENT
Start: 2023-09-13

## 2023-09-13 RX ORDER — BUSPIRONE HYDROCHLORIDE 15 MG/1
15 TABLET ORAL 2 TIMES DAILY
Qty: 60 TABLET | Refills: 1 | Status: SHIPPED | OUTPATIENT
Start: 2023-09-13

## 2023-09-13 RX ORDER — BUPROPION HYDROCHLORIDE 300 MG/1
300 TABLET ORAL EVERY MORNING
Qty: 30 TABLET | Refills: 1 | Status: SHIPPED | OUTPATIENT
Start: 2023-09-13

## 2023-09-13 NOTE — PROGRESS NOTES
Follow Up Office Visit    Patient Name: Naima Paul  : 1965   MRN: [id number]   Care Team: Patient Care Team:  Rubi Jackson PA as PCP - General (Physician Assistant)  Carol Rao APRN as Nurse Practitioner (Behavioral Health)         Chief Complaint:    Chief Complaint   Patient presents with    ADHD    Anxiety    Depression    Med Management       History of Present Illness: Naima Paul is a 58 y.o. female who is here today for a medication management follow up. Patient reports that she started taking the Jornay later and feels that it has been helpful. She feels that it is working better and her focus and concentration are doing well. She continues be anxious and worries a lot. She reports having a hard time winding down because of her worry. She denies SI/HI today.     The following portion of the patient's history were reviewed and updated appropriately: allergies, current and past medications, family history, medical history and social history.  Subjective   Review of Systems:    Review of Systems   Psychiatric/Behavioral:  The patient is nervous/anxious.    All other systems reviewed and are negative.    Current Medications:   Current Outpatient Medications   Medication Sig Dispense Refill    buPROPion XL (WELLBUTRIN XL) 300 MG 24 hr tablet Take 1 tablet by mouth Every Morning. 30 tablet 1    escitalopram (LEXAPRO) 20 MG tablet Take 1 tablet by mouth Daily. 30 tablet 1    amphetamine-dextroamphetamine (Adderall) 20 MG tablet Take 1 tablet by mouth Daily. 30 tablet 0    apixaban (ELIQUIS) 5 MG tablet tablet Take 1 tablet by mouth Every 12 (Twelve) Hours. Indications: Other - full anticoagulation 180 tablet 3    busPIRone (BUSPAR) 15 MG tablet Take 1 tablet by mouth 2 (Two) Times a Day. 60 tablet 1    Cholecalciferol (VITAMIN D3) 50 MCG ( UT) capsule Take 1 capsule by mouth Daily.      loratadine (CLARITIN) 10 MG tablet Take 1 tablet by mouth Daily.      Methylphenidate HCl ER,  "PM, (Jornay PM) 40 MG capsule sustained-release 24 hr Take 40 mg by mouth Every Night. 30 capsule 0    metoprolol succinate XL (TOPROL-XL) 50 MG 24 hr tablet metoprolol succinate ER 50 mg tablet,extended release 24 hr   TAKE 1 TABLET BY MOUTH ONCE DAILY \"PT MUST BE SEEN FOR ADDITIONAL REFILS\"      montelukast (SINGULAIR) 10 MG tablet Take 1 tablet by mouth Every Night.       No current facility-administered medications for this visit.       Mental Status Exam:   Hygiene:   good  Cooperation:  Cooperative  Eye Contact:  Good  Psychomotor Behavior:  Appropriate  Affect:  Appropriate  Mood: normal  Speech:  Normal  Thought Process:  Goal directed and Linear  Thought Content:  Normal and Mood congruent  Suicidal:  None  Homicidal:  None  Hallucinations:  None  Delusion:  None  Memory:  Intact  Orientation:  Person, Place, Time, and Situation  Reliability:  good  Insight:  Good  Judgement:  Good  Impulse Control:  Good  Physical/Medical Issues:  Yes see chart      Objective   Vital Signs:   There were no vitals taken for this visit.      Assessment / Plan    Diagnoses and all orders for this visit:    1. Attention deficit hyperactivity disorder (ADHD), combined type, mild (Primary)   - Continue Jornay 40 mg  2. Major depressive disorder, recurrent episode, moderate  -     escitalopram (LEXAPRO) 20 MG tablet; Take 1 tablet by mouth Daily.  Dispense: 30 tablet; Refill: 1  -     buPROPion XL (WELLBUTRIN XL) 300 MG 24 hr tablet; Take 1 tablet by mouth Every Morning.  Dispense: 30 tablet; Refill: 1    3. Generalized anxiety disorder  -     busPIRone (BUSPAR) 15 MG tablet; Take 1 tablet by mouth 2 (Two) Times a Day.  Dispense: 60 tablet; Refill: 1  -     escitalopram (LEXAPRO) 20 MG tablet; Take 1 tablet by mouth Daily.  Dispense: 30 tablet; Refill: 1  -     buPROPion XL (WELLBUTRIN XL) 300 MG 24 hr tablet; Take 1 tablet by mouth Every Morning.  Dispense: 30 tablet; Refill: 1       Will increase Buspar and continue all other " medication as ordered.     A psychological evaluation was conducted in order to assess past and current level of functioning. Areas assessed included, but were not limited to: perception of social support, perception of ability to face and deal with challenges in life (positive functioning), anxiety symptoms, depressive symptoms, perspective on beliefs/belief system, coping skills for stress, intelligence level,  Therapeutic rapport was established. Interventions conducted today were geared towards incorporating medication management along with support for continued therapy. Education was also provided as to the med management with this provider and what to expect in subsequent sessions.      We discussed risks, benefits, goals and side effects of the above medication and the patient was agreeable with the plan. Patient was educated on the importance of compliance with treatment and follow-up appointments. Patient is aware to contact the [facility] with any worsening of symptoms. To call for questions or concerns and return early if necessary. Patent is agreeable to go to the Emergency Department or call 911 should they begin SI/HI.      PHQ-2/PHQ-9: Depression Screening  Little Interest or Pleasure in Doing Things: 1-->several days  Feeling Down, Depressed or Hopeless: 1-->several days  PHQ-2 Total Score: 2  Trouble Falling or Staying Asleep, or Sleeping Too Much: 1-->several days  Feeling Tired or Having Little Energy: 1-->several days  Poor Appetite or Overeatin-->not at all  Feeling Bad about Yourself - or that You are a Failure or Have Let Yourself or Your Family Down: 2-->more than half the days  Trouble Concentrating on Things, Such as Reading the Newspaper or Watching Television: 1-->several days  Moving or Speaking So Slowly that Other People Could Have Noticed? Or the Opposite - Being So Fidgety: 1-->several days  Thoughts that You Would be Better Off Dead or of Hurting Yourself in Some Way:  0-->not at all  PHQ-9: Brief Depression Severity Measure Score: 8  If You Checked Off Any Problems, How Difficult Have These Problems Made It For You to Do Your Work, Take Care of Things at Home, or Get Along with Other People?: somewhat difficult      PHQ-9 Score:   PHQ-9 Total Score: 8    Depression Screening:  Patient screened positive for depression based on a PHQ-9 score of 8 on 9/13/2023. Follow-up recommendations include: Prescribed antidepressant medication treatment and Suicide Risk Assessment performed.      Over the last two weeks, how often have you been bothered by the following problems?  Feeling nervous, anxious or on edge: More than half the days  Not being able to stop or control worrying: More than half the days  Worrying too much about different things: More than half the days  Trouble Relaxing: Several days  Being so restless that it is hard to sit still: More than half the days  Becoming easily annoyed or irritable: Several days  Feeling afraid as if something awful might happen: Not at all  SAMIA 7 Total Score: 10  If you checked any problems, how difficult have these problems made it for you to do your work, take care of things at home, or get along with other people: Somewhat difficult        Screening for Adults With ADHD - (1-6)  1. How often do you have trouble wrapping up the final details of a project, once the challenging parts have been done?: Rarely  2. How often do you have difficulty getting things in order when you have to do a task that requires organization?: Often  3. How often do you have problems remembering appointments or obligations : Often  4. When you have a task that requires a lot of thought, how often do you avoid or delay getting started ?: Often  5. How often do you fidget or squirm with your hands or feet when you have to sit down for a long time?: Often  6. How often do you feel overly active and compelled to do things, like you were driven by a motor?: Never  7. How  often do you make careless mistakes when you have to work on a boring or difficult project?: Sometimes  8. How often do have difficulty keeping your attention when you are doing boring or repetitive work?: Often  9. How often do you have difficulty concentrating on what people say to you, even when they are speaking to you: Often  10.How often do you misplace or have difficulty finding things at home or at work?: Rarely  11.How often are you distracted by activity or noise around you?: Rarely  12.How often do you leave your seat in meetings or other situations in which you are expected to remain seated?: Never  13.How often do you feel restless or fidgety?: Rarely  14.How often do you have difficulty unwinding and relaxing when you have time to yourself?: Sometimes  15.How often do you find yourself talking too much when you are in social situations?: Sometimes  16.When you’re in a conversation, how often do you find yourself finishing the sentences of the people you are talking to, before they can finish them themselves?: Never  17.How often do you have difficulty waiting your turn in situations when turn taking is required?: Rarely  18.How often do you interrupt others when they are busy?: Rarely        MEDS ORDERED DURING VISIT:  New Medications Ordered This Visit   Medications    busPIRone (BUSPAR) 15 MG tablet     Sig: Take 1 tablet by mouth 2 (Two) Times a Day.     Dispense:  60 tablet     Refill:  1    escitalopram (LEXAPRO) 20 MG tablet     Sig: Take 1 tablet by mouth Daily.     Dispense:  30 tablet     Refill:  1    buPROPion XL (WELLBUTRIN XL) 300 MG 24 hr tablet     Sig: Take 1 tablet by mouth Every Morning.     Dispense:  30 tablet     Refill:  1         Follow Up   Return in about 8 weeks (around 11/8/2023).  Patient was given instructions and counseling regarding her condition or for health maintenance advice. Please see specific information pulled into the AVS if appropriate.     TREATMENT PLAN/GOALS:  Continue supportive psychotherapy efforts and medications as indicated. Treatment and medication options discussed during today's visit. Patient acknowledged and verbally consented to continue with current treatment plan and was educated on the importance of compliance with treatment and follow-up appointments.    MEDICATION ISSUES:  Discussed medication options and treatment plan of prescribed medication as well as the risks, benefits, and side effects including potential falls, possible impaired driving and metabolic adversities among others. Patient is agreeable to call the office with any worsening of symptoms or onset of side effects. Patient is agreeable to call 911 or go to the nearest ER should he/she begin having SI/HI.        [facility]  [address] [place] [address] [phone]    September 13, 2023 12:45 EDT

## 2023-10-04 DIAGNOSIS — G08 CEREBRAL VENOUS SINUS THROMBOSIS, CHRONIC: ICD-10-CM

## 2023-10-04 RX ORDER — APIXABAN 5 MG/1
TABLET, FILM COATED ORAL
Qty: 180 TABLET | Refills: 0 | Status: SHIPPED | OUTPATIENT
Start: 2023-10-04

## 2023-10-09 DIAGNOSIS — F90.2 ATTENTION DEFICIT HYPERACTIVITY DISORDER (ADHD), COMBINED TYPE, MILD: ICD-10-CM

## 2023-10-09 RX ORDER — METHYLPHENIDATE HYDROCHLORIDE 40 MG/1
40 CAPSULE ORAL NIGHTLY
Qty: 30 CAPSULE | Refills: 0 | Status: SHIPPED | OUTPATIENT
Start: 2023-10-09

## 2023-10-09 NOTE — TELEPHONE ENCOUNTER
Patient called office requesting a refill on:    Jornay 40 MG    Patient is currently out of medication  Verified current phone number and pharmacy on file for patient.

## 2023-11-14 DIAGNOSIS — F90.2 ATTENTION DEFICIT HYPERACTIVITY DISORDER (ADHD), COMBINED TYPE, MILD: ICD-10-CM

## 2023-11-14 RX ORDER — METHYLPHENIDATE HYDROCHLORIDE 40 MG/1
40 CAPSULE ORAL NIGHTLY
Qty: 30 CAPSULE | Refills: 0 | Status: SHIPPED | OUTPATIENT
Start: 2023-11-14 | End: 2023-11-20 | Stop reason: DRUGHIGH

## 2023-11-20 ENCOUNTER — OFFICE VISIT (OUTPATIENT)
Dept: BEHAVIORAL HEALTH | Facility: CLINIC | Age: 58
End: 2023-11-20
Payer: COMMERCIAL

## 2023-11-20 VITALS — BODY MASS INDEX: 35.44 KG/M2 | HEIGHT: 63 IN | WEIGHT: 200 LBS

## 2023-11-20 DIAGNOSIS — F51.04 PSYCHOPHYSIOLOGICAL INSOMNIA: ICD-10-CM

## 2023-11-20 DIAGNOSIS — F33.1 MAJOR DEPRESSIVE DISORDER, RECURRENT EPISODE, MODERATE: ICD-10-CM

## 2023-11-20 DIAGNOSIS — F90.2 ATTENTION DEFICIT HYPERACTIVITY DISORDER (ADHD), COMBINED TYPE, MILD: Primary | ICD-10-CM

## 2023-11-20 DIAGNOSIS — F41.1 GENERALIZED ANXIETY DISORDER: ICD-10-CM

## 2023-11-20 PROCEDURE — 99214 OFFICE O/P EST MOD 30 MIN: CPT

## 2023-11-20 RX ORDER — BUSPIRONE HYDROCHLORIDE 15 MG/1
15 TABLET ORAL 2 TIMES DAILY
Qty: 60 TABLET | Refills: 1 | Status: SHIPPED | OUTPATIENT
Start: 2023-11-20

## 2023-11-20 RX ORDER — QUETIAPINE FUMARATE 25 MG/1
12.5-5 TABLET, FILM COATED ORAL NIGHTLY PRN
Qty: 60 TABLET | Refills: 1 | Status: SHIPPED | OUTPATIENT
Start: 2023-11-20

## 2023-11-20 RX ORDER — METHYLPHENIDATE HYDROCHLORIDE 60 MG/1
60 CAPSULE ORAL NIGHTLY
Qty: 30 CAPSULE | Refills: 0 | Status: SHIPPED | OUTPATIENT
Start: 2023-11-20

## 2023-11-20 RX ORDER — BUPROPION HYDROCHLORIDE 300 MG/1
300 TABLET ORAL EVERY MORNING
Qty: 30 TABLET | Refills: 1 | Status: SHIPPED | OUTPATIENT
Start: 2023-11-20

## 2023-11-20 RX ORDER — ESCITALOPRAM OXALATE 20 MG/1
20 TABLET ORAL DAILY
Qty: 30 TABLET | Refills: 1 | Status: SHIPPED | OUTPATIENT
Start: 2023-11-20

## 2023-11-20 NOTE — PROGRESS NOTES
Follow Up Office Visit    Patient Name: Naima Paul  : 1965   MRN: [id number]   Care Team: Patient Care Team:  Rubi Jackson PA as PCP - General (Physician Assistant)  Carol Rao APRN as Nurse Practitioner (Behavioral Health)         Chief Complaint:    Chief Complaint   Patient presents with    ADHD    Anxiety    Depression    Sleeping Problem    Med Management       History of Present Illness: Naima Paul is a 58 y.o. female who is here today for a medication management follow up. Patient reports she continues to deal with a lot of increased anxiety and worry. She feels that it is worse at night and at times will effect her sleep. She also doesn't feel as motivated to get up and get things done. She feels that her focus and task completion are not doing as well as they had been. She denies SI/HI today.     The following portion of the patient's history were reviewed and updated appropriately: allergies, current and past medications, family history, medical history and social history.  Subjective   Review of Systems:    Review of Systems   Psychiatric/Behavioral:  Positive for decreased concentration, sleep disturbance and stress. The patient is nervous/anxious.    All other systems reviewed and are negative.      Current Medications:   Current Outpatient Medications   Medication Sig Dispense Refill    amphetamine-dextroamphetamine (Adderall) 20 MG tablet Take 1 tablet by mouth Daily. 30 tablet 0    buPROPion XL (WELLBUTRIN XL) 300 MG 24 hr tablet Take 1 tablet by mouth Every Morning. 30 tablet 1    busPIRone (BUSPAR) 15 MG tablet Take 1 tablet by mouth 2 (Two) Times a Day. 60 tablet 1    Cholecalciferol (VITAMIN D3) 50 MCG (2000 UT) capsule Take 1 capsule by mouth Daily.      Eliquis 5 MG tablet tablet TAKE 1 TABLET BY MOUTH EVERY 12 HOURS 180 tablet 0    escitalopram (LEXAPRO) 20 MG tablet Take 1 tablet by mouth Daily. 30 tablet 1    loratadine (CLARITIN) 10 MG tablet Take 1 tablet by  "mouth Daily.      metoprolol succinate XL (TOPROL-XL) 50 MG 24 hr tablet metoprolol succinate ER 50 mg tablet,extended release 24 hr   TAKE 1 TABLET BY MOUTH ONCE DAILY \"PT MUST BE SEEN FOR ADDITIONAL REFILS\"      montelukast (SINGULAIR) 10 MG tablet Take 1 tablet by mouth Every Night.      Methylphenidate HCl ER, PM, (Jornay PM) 60 MG capsule sustained-release 24 hr Take 60 mg by mouth Every Night. 30 capsule 0    QUEtiapine (SEROquel) 25 MG tablet Take 0.5-2 tablets by mouth At Night As Needed (anxiety/sleep). 60 tablet 1     No current facility-administered medications for this visit.       Mental Status Exam:   Hygiene:   good  Cooperation:  Cooperative  Eye Contact:  Good  Psychomotor Behavior:  Appropriate  Affect:  Appropriate  Mood: anxious  Speech:  Normal  Thought Process:  Goal directed and Linear  Thought Content:  Normal and Mood congruent  Suicidal:  None  Homicidal:  None  Hallucinations:  None  Delusion:  None  Memory:  Intact  Orientation:  Person, Place, Time, and Situation  Reliability:  good  Insight:  Good  Judgement:  Good  Impulse Control:  Good  Physical/Medical Issues:  Yes see chart       Objective   Vital Signs:   Ht 160 cm (63\")   Wt 90.7 kg (200 lb)   BMI 35.43 kg/m²       Assessment / Plan    Diagnoses and all orders for this visit:    1. Attention deficit hyperactivity disorder (ADHD), combined type, mild (Primary)  -     Methylphenidate HCl ER, PM, (Jornay PM) 60 MG capsule sustained-release 24 hr; Take 60 mg by mouth Every Night.  Dispense: 30 capsule; Refill: 0    2. Major depressive disorder, recurrent episode, moderate  -     buPROPion XL (WELLBUTRIN XL) 300 MG 24 hr tablet; Take 1 tablet by mouth Every Morning.  Dispense: 30 tablet; Refill: 1  -     escitalopram (LEXAPRO) 20 MG tablet; Take 1 tablet by mouth Daily.  Dispense: 30 tablet; Refill: 1    3. Generalized anxiety disorder  -     QUEtiapine (SEROquel) 25 MG tablet; Take 0.5-2 tablets by mouth At Night As Needed " (anxiety/sleep).  Dispense: 60 tablet; Refill: 1  -     buPROPion XL (WELLBUTRIN XL) 300 MG 24 hr tablet; Take 1 tablet by mouth Every Morning.  Dispense: 30 tablet; Refill: 1  -     busPIRone (BUSPAR) 15 MG tablet; Take 1 tablet by mouth 2 (Two) Times a Day.  Dispense: 60 tablet; Refill: 1  -     escitalopram (LEXAPRO) 20 MG tablet; Take 1 tablet by mouth Daily.  Dispense: 30 tablet; Refill: 1    4. Psychophysiological insomnia  -     QUEtiapine (SEROquel) 25 MG tablet; Take 0.5-2 tablets by mouth At Night As Needed (anxiety/sleep).  Dispense: 60 tablet; Refill: 1       Will increase Jornay and add Seroquel nightly PRN. Continue all other medication as ordered.     A psychological evaluation was conducted in order to assess past and current level of functioning. Areas assessed included, but were not limited to: perception of social support, perception of ability to face and deal with challenges in life (positive functioning), anxiety symptoms, depressive symptoms, perspective on beliefs/belief system, coping skills for stress, intelligence level,  Therapeutic rapport was established. Interventions conducted today were geared towards incorporating medication management along with support for continued therapy. Education was also provided as to the med management with this provider and what to expect in subsequent sessions.      We discussed risks, benefits, goals and side effects of the above medication and the patient was agreeable with the plan. Patient was educated on the importance of compliance with treatment and follow-up appointments. Patient is aware to contact the [facility] with any worsening of symptoms. To call for questions or concerns and return early if necessary. Patent is agreeable to go to the Emergency Department or call 911 should they begin SI/HI.      PHQ-2/PHQ-9: Depression Screening  Little Interest or Pleasure in Doing Things: 1-->several days  Feeling Down, Depressed or Hopeless:  1-->several days  PHQ-2 Total Score: 2  Trouble Falling or Staying Asleep, or Sleeping Too Much: 2-->more than half the days  Feeling Tired or Having Little Energy: 1-->several days  Poor Appetite or Overeatin-->not at all  Feeling Bad about Yourself - or that You are a Failure or Have Let Yourself or Your Family Down: 2-->more than half the days  Trouble Concentrating on Things, Such as Reading the Newspaper or Watching Television: 2-->more than half the days  Moving or Speaking So Slowly that Other People Could Have Noticed? Or the Opposite - Being So Fidgety: 2-->more than half the days  Thoughts that You Would be Better Off Dead or of Hurting Yourself in Some Way: 0-->not at all  PHQ-9: Brief Depression Severity Measure Score: 11  If You Checked Off Any Problems, How Difficult Have These Problems Made It For You to Do Your Work, Take Care of Things at Home, or Get Along with Other People?: not difficult at all      PHQ-9 Score:   PHQ-9 Total Score: 11    Depression Screening:  Patient screened positive for depression based on a PHQ-9 score of 11 on 2023. Follow-up recommendations include: Prescribed antidepressant medication treatment and Suicide Risk Assessment performed.      Over the last two weeks, how often have you been bothered by the following problems?  Feeling nervous, anxious or on edge: Not at all  Not being able to stop or control worrying: Nearly every day  Worrying too much about different things: Nearly every day  Trouble Relaxing: Several days  Being so restless that it is hard to sit still: Not at all  Becoming easily annoyed or irritable: Several days  Feeling afraid as if something awful might happen: Not at all  SAMIA 7 Total Score: 8  If you checked any problems, how difficult have these problems made it for you to do your work, take care of things at home, or get along with other people: Not difficult at all        Screening for Adults With ADHD - (1-6)  1. How often do you have  trouble wrapping up the final details of a project, once the challenging parts have been done?: Often  2. How often do you have difficulty getting things in order when you have to do a task that requires organization?: Sometimes  3. How often do you have problems remembering appointments or obligations : Sometimes  4. When you have a task that requires a lot of thought, how often do you avoid or delay getting started ?: Often  5. How often do you fidget or squirm with your hands or feet when you have to sit down for a long time?: Often  6. How often do you feel overly active and compelled to do things, like you were driven by a motor?: Never  7. How often do you make careless mistakes when you have to work on a boring or difficult project?: Rarely  8. How often do have difficulty keeping your attention when you are doing boring or repetitive work?: Sometimes  9. How often do you have difficulty concentrating on what people say to you, even when they are speaking to you: Often  10.How often do you misplace or have difficulty finding things at home or at work?: Rarely  11.How often are you distracted by activity or noise around you?: Often  12.How often do you leave your seat in meetings or other situations in which you are expected to remain seated?: Never  13.How often do you feel restless or fidgety?: Never  14.How often do you have difficulty unwinding and relaxing when you have time to yourself?: Rarely  15.How often do you find yourself talking too much when you are in social situations?: Never  16.When you’re in a conversation, how often do you find yourself finishing the sentences of the people you are talking to, before they can finish them themselves?: Never  17.How often do you have difficulty waiting your turn in situations when turn taking is required?: Never  18.How often do you interrupt others when they are busy?: Rarely        MEDS ORDERED DURING VISIT:  New Medications Ordered This Visit    Medications    Methylphenidate HCl ER, PM, (Jornay PM) 60 MG capsule sustained-release 24 hr     Sig: Take 60 mg by mouth Every Night.     Dispense:  30 capsule     Refill:  0    QUEtiapine (SEROquel) 25 MG tablet     Sig: Take 0.5-2 tablets by mouth At Night As Needed (anxiety/sleep).     Dispense:  60 tablet     Refill:  1    buPROPion XL (WELLBUTRIN XL) 300 MG 24 hr tablet     Sig: Take 1 tablet by mouth Every Morning.     Dispense:  30 tablet     Refill:  1    busPIRone (BUSPAR) 15 MG tablet     Sig: Take 1 tablet by mouth 2 (Two) Times a Day.     Dispense:  60 tablet     Refill:  1    escitalopram (LEXAPRO) 20 MG tablet     Sig: Take 1 tablet by mouth Daily.     Dispense:  30 tablet     Refill:  1         Follow Up   Return in about 8 weeks (around 1/15/2024).  Patient was given instructions and counseling regarding her condition or for health maintenance advice. Please see specific information pulled into the AVS if appropriate.     TREATMENT PLAN/GOALS: Continue supportive psychotherapy efforts and medications as indicated. Treatment and medication options discussed during today's visit. Patient acknowledged and verbally consented to continue with current treatment plan and was educated on the importance of compliance with treatment and follow-up appointments.    MEDICATION ISSUES:  Discussed medication options and treatment plan of prescribed medication as well as the risks, benefits, and side effects including potential falls, possible impaired driving and metabolic adversities among others. Patient is agreeable to call the office with any worsening of symptoms or onset of side effects. Patient is agreeable to call 911 or go to the nearest ER should he/she begin having SI/HI.        [facility]  [address] [place] [address] [address] [place] [place] [address]  [phone]    November 20, 2023 11:45 EST

## 2024-01-02 DIAGNOSIS — G08 CEREBRAL VENOUS SINUS THROMBOSIS, CHRONIC: ICD-10-CM

## 2024-01-02 RX ORDER — APIXABAN 5 MG/1
TABLET, FILM COATED ORAL
Qty: 60 TABLET | Refills: 0 | Status: SHIPPED | OUTPATIENT
Start: 2024-01-02

## 2024-01-02 NOTE — TELEPHONE ENCOUNTER
Rx Refill Note  Requested Prescriptions     Pending Prescriptions Disp Refills    Eliquis 5 MG tablet tablet [Pharmacy Med Name: Eliquis 5 MG Oral Tablet] 180 tablet 0     Sig: TAKE 1 TABLET BY MOUTH EVERY 12 HOURS      Last filled: 10/4/23 90 days + 0 refills    Last office visit with prescribing clinician: 2/16/2023      Next office visit with prescribing clinician: 1/16/2024     Jv Candelaria MA  01/02/24, 11:19 EST

## 2024-01-16 ENCOUNTER — OFFICE VISIT (OUTPATIENT)
Dept: NEUROLOGY | Facility: CLINIC | Age: 59
End: 2024-01-16
Payer: COMMERCIAL

## 2024-01-16 VITALS
BODY MASS INDEX: 38.09 KG/M2 | SYSTOLIC BLOOD PRESSURE: 126 MMHG | DIASTOLIC BLOOD PRESSURE: 74 MMHG | HEIGHT: 63 IN | HEART RATE: 79 BPM | OXYGEN SATURATION: 98 % | WEIGHT: 215 LBS

## 2024-01-16 DIAGNOSIS — R41.841 COGNITIVE COMMUNICATION DISORDER: Primary | ICD-10-CM

## 2024-01-16 DIAGNOSIS — G44.89 OTHER HEADACHE SYNDROME: ICD-10-CM

## 2024-01-16 DIAGNOSIS — G08 CEREBRAL VENOUS SINUS THROMBOSIS, CHRONIC: ICD-10-CM

## 2024-01-16 PROCEDURE — 99214 OFFICE O/P EST MOD 30 MIN: CPT | Performed by: NURSE PRACTITIONER

## 2024-01-16 NOTE — PROGRESS NOTES
"Subjective:     Patient ID: Naima Paul is a 58 y.o. female.    CC:   Chief Complaint   Patient presents with    Chronic Venous Sinus Thrombosis    Headache    Cognitive Communication Disorder       HPI:   History of Present Illness  Today, 01/16/2024-  This is a pleasant 58-year-old female who presents for a 1-year neurology follow-up on cognitive communication disorder, chronic cerebral venous sinus thrombosis, and headaches all present since 03/2022. She is on anticoagulation. We referred her to speech language pathology for additional exercises to help with her symptoms. She is on Eliquis lifelong. Headaches have been minimal. She has been using Tylenol and gabapentin as needed. She is following with psychiatry for attention deficit hyperactivity disorder, and she is currently on methylphenidate ER. She is also on medications for anxiety and depression, all managed by psychiatry. She is here for follow-up and refills on medications today. She is accompanied by her  during today's visit.    Today, she reports her headaches are \"okay.\" She states she still has pressure on the right side of her head intermittently. She cannot bend over and straighten back up without getting lightheaded. She denies any pain, but she feels lightheaded with quick position changes. She notes she does not drink enough water. She denies any severe headaches since the blood clots. She denies any sharp or stabbing headaches. She states she has not noticed a pattern to the intermittent pressure in the right side of her head. She takes Tylenol when she gets the head pain and confirms it is helpful.    She states her thinking is sometimes \"slow.\" She confirms speech therapy went well, and she found it helpful. She is still using the tools that were recommended, and it has been helpful.     She denies any symptoms of stroke including weakness, numbness, tingling, slurred speech, or loss of vision. She states she has a \"spot\" in her " "vision where she cannot see at times. She notes her doctor told her \"everything had healed\" from when she had COVID-19, but she still has the spot that she cannot see. She still completes annual eye exams. She denies any swelling behind her eyes.    She confirms she is still following with psychiatry and counseling, BERE Chang, and that she is prescribed medications. She states she sleeps \"okay\" overall.    Her  reports she has been \"off\" the last couple of weeks. He states she \"was not acting right.\" She was different and did not seem herself. He notes her routine has changed, and she is not as organized. She has also been driving slowly.    Prior extensive neurological workup & history:  She was admitted to [facility] from 03/30/2022 until 04/03/2022 for symptoms of right (laterality clarified by patient) arm weakness, paresthesias, and syncope. She had been diagnosed with COVID-19 on 02/15/2022, and her symptoms had improved significantly. She developed acute episodes of numbness that waxed, and waned, and had some transient vision loss, and syncope. At home, her  found her unresponsive on the floor. She was admitted, and underwent neuroimaging with MRI of the brain without contrast showing no acute stroke or acute intracranial abnormalities. MRA venogram completed on 03/31/2022 showed right transverse, and right sigmoid sinus, venous sinus thrombosis. She also underwent a CT cerebral perfusion with and without contrast, which showed no ischemia. She underwent a CTA of the neck which showed no significant abnormalities, and CTA of the head showed no significant abnormalities. She was started on heparin, and was treated with gabapentin for some headaches she was experiencing. She was discharged home on Eliquis.     During her hospitalization, she also underwent echocardiogram with agitated saline, and this showed a normal ejection fraction of 61 to 65 percent with negative " bubble study. Inpatient neurology consultation was completed by Dr. Carbajal, neurology, and her recommendations included the Eliquis 5 mg twice daily for a minimum of 3 months, continuing gabapentin 300 mg 3 times daily for her headaches, and avoiding over-the-counter medications for her headaches to avoid overuse. She also underwent an EEG at the time, and this was normal.     The patient states that 1 month before being admitted to the hospital she had contracted COVID-19.      The patient denies previous thrombus, DVT or PE. She denies family history of bleeding or clotting disorders.      She has anaphylactic reaction to Motrin. She states she has never had any trouble taking aspirin.     She was evaluated by Dr. Davion De Los Santos with [facility] on 07/25/2022. He reviewed her MRA venogram completed on 07/06/2022, and this did show persistent loss of signal intensity within the right transverse sinus and right sigmoid sinus consistent with persistent venous sinus thrombosis. This was completed on 07/06/2022 and compared to 03/31/2022. He felt that the thrombosis of the superior sagittal sinus and right transverse sigmoid sinus were likely attributable to hypercoagulability and recent COVID-19 infection. He recommended that she continue Eliquis. He also noted that this is likely her new baseline. She does not require any surgical intervention.      She has a history of sammy COVID-19 prior to her hospitalization in March 2022.     The patient's highest level of education is some college. She is retired. 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    Chronic cerebral venous sinus thrombosis along with headaches present since March 2022. She is on anticoagulation. She has reported some issues with short-term memory. Vitamin B12, folate, and methylmalonic acid levels were checked in 2022 and within normal limits.      She confirms she is still taking Eliquis. She denies any unusual  bruising or bleeding. She denies any blood in her stool, urine, or nosebleeds.    She completed SLP for cognitive rehab in 2023 and found this helpful.    Updated eye exam annually.    The following portions of the patient's history were reviewed and updated as appropriate: allergies, current medications, past family history, past medical history, past social history, past surgical history, and problem list.    Past Medical History:   Diagnosis Date    Anemia     Anxiety     CTS (carpal tunnel syndrome) March 1994    Depression     Diabetes     Headache, migraine     Hypertension     Obstructive sleep apnea syndrome 08/07/2022    Other headache syndrome 02/16/2023    Peripheral neuropathy     Shingles     Sleep apnea     Syncope and collapse 03/30/2022       Past Surgical History:   Procedure Laterality Date    ADENOIDECTOMY      CHOLECYSTECTOMY      COLONOSCOPY  2016    DIAGNOSTIC LAPAROSCOPY      ENDOMETRIAL ABLATION      TONSILLECTOMY         Social History     Socioeconomic History    Marital status:    Tobacco Use    Smoking status: Never    Smokeless tobacco: Never   Vaping Use    Vaping Use: Never used   Substance and Sexual Activity    Alcohol use: Yes     Alcohol/week: 2.0 standard drinks of alcohol     Types: 2 Drinks containing 0.5 oz of alcohol per week     Comment: occ 2 per week    Drug use: Never    Sexual activity: Yes     Partners: Male     Birth control/protection: Post-menopausal       Family History   Problem Relation Age of Onset    Hypertension Mother     Cancer Mother     Seizures Mother     Drug abuse Father     Hypertension Father     Cancer Father     Alcohol abuse Father     Depression Father           Current Outpatient Medications:     amphetamine-dextroamphetamine (Adderall) 20 MG tablet, Take 1 tablet by mouth Daily., Disp: 30 tablet, Rfl: 0    apixaban (Eliquis) 5 MG tablet tablet, Take 1 tablet by mouth Every 12 (Twelve) Hours., Disp: 180 tablet, Rfl: 3    buPROPion XL  "(WELLBUTRIN XL) 300 MG 24 hr tablet, Take 1 tablet by mouth Every Morning., Disp: 30 tablet, Rfl: 1    busPIRone (BUSPAR) 15 MG tablet, Take 1 tablet by mouth 2 (Two) Times a Day., Disp: 60 tablet, Rfl: 1    escitalopram (LEXAPRO) 20 MG tablet, Take 1 tablet by mouth Daily., Disp: 30 tablet, Rfl: 1    loratadine (CLARITIN) 10 MG tablet, Take 1 tablet by mouth Daily., Disp: , Rfl:     Methylphenidate HCl ER, PM, (Jornay PM) 60 MG capsule sustained-release 24 hr, Take 60 mg by mouth Every Night., Disp: 30 capsule, Rfl: 0    metoprolol succinate XL (TOPROL-XL) 50 MG 24 hr tablet, metoprolol succinate ER 50 mg tablet,extended release 24 hr  TAKE 1 TABLET BY MOUTH ONCE DAILY \"PT MUST BE SEEN FOR ADDITIONAL REFILS\", Disp: , Rfl:     montelukast (SINGULAIR) 10 MG tablet, Take 1 tablet by mouth Every Night., Disp: , Rfl:     QUEtiapine (SEROquel) 25 MG tablet, Take 0.5-2 tablets by mouth At Night As Needed (anxiety/sleep)., Disp: 60 tablet, Rfl: 1     Review of Systems   Psychiatric/Behavioral:  Positive for decreased concentration.    All other systems reviewed and are negative.       Objective:  /74   Pulse 79   Ht 160 cm (63\")   Wt 97.5 kg (215 lb)   SpO2 98%   BMI 38.09 kg/m²     Neurologic Exam     Mental Status   Oriented to person, place, and time.   Speech: (Cognitive-communication disorder-mild)  Level of consciousness: alert  Knowledge: good.     Cranial Nerves   Cranial nerves II through XII intact.     Motor Exam   Muscle bulk: normal  Overall muscle tone: normal    Strength   Strength 5/5 throughout.     Gait, Coordination, and Reflexes     Gait  Gait: normal    Coordination   Finger to nose coordination: normal    Tremor   Resting tremor: absent  Intention tremor: absent  Action tremor: absent    Reflexes   Right : 2+  Left : 2+      Physical Exam  Constitutional:       Appearance: Normal appearance. She is obese.      Comments: BMI 38.1   Neurological:      Mental Status: She is alert and " "oriented to person, place, and time.      Cranial Nerves: Cranial nerves 2-12 are intact.      Motor: Motor strength is normal.     Coordination: Finger-Nose-Finger Test normal.      Gait: Gait is intact.   Psychiatric:         Mood and Affect: Mood is anxious.         Speech: Speech is delayed (mild).         Behavior: Behavior is slowed.         Thought Content: Thought content normal.         Cognition and Memory: She exhibits impaired recent memory (mild with word findings issues at baseline).         Judgment: Judgment normal.       Yogi Cognitive Assessment the past 2 visits has been 29 out of 30 including today. 4 out of 5 for word recall.    Assessment/Plan:       Diagnoses and all orders for this visit:    1. Cognitive communication disorder (Primary)  Comments:  continue home SLP exercises-stable-monitor    2. Cerebral venous sinus thrombosis, chronic  Comments:  continue Eliquis lifelong  Orders:  -     apixaban (Eliquis) 5 MG tablet tablet; Take 1 tablet by mouth Every 12 (Twelve) Hours.  Dispense: 180 tablet; Refill: 3    3. Other headache syndrome  Comments:  rare, continue tylenol prn         She is with her  during today's visit. A few times, he has noted that she has seemed off or not quite herself. This has been over the past several weeks or month. She has not had any specific concerns. I did offer to complete MR venogram and MRI of the brain with and without contrast again today; however, they declined kindly. I did ask her to increase fluid intake and change positions slowly. I also asked her to keep track of when she does experience those symptoms of feeling \"off\" such as her  says, and if it is ever associated with taking the Seroquel the night before. She is on several medications for mood, and some of these medications can affect alertness. She and her  are aware. We are now going to follow up in 1 year, for reevaluation of symptoms and repeat Yogi Cognitive " Assessment, or sooner if needed. They verbalize understanding and agree with plan moving forward today. They will call us with any questions or concerns prior to follow up in clinic.    Reviewed medications, potential side effects and signs and symptoms to report. Discussed risk versus benefits of treatment plan with patient and/or family-including medications, labs and radiology that may be ordered. Addressed questions and concerns during visit. Patient and/or family verbalized understanding and agree with plan.    During this visit the following were done:  Labs Reviewed []    Labs Ordered []    Radiology Reports Reviewed []    Radiology Ordered []    PCP Records Reviewed []    Referring Provider Records Reviewed []    ER Records Reviewed []    Hospital Records Reviewed []    History Obtained From Family [x]    Radiology Images Reviewed []    Other Reviewed []    Records Requested []      Transcribed from ambient dictation for BERE Elmore by Kelsey Cooney.  01/16/24   15:37 EST    Patient or patient representative verbalized consent to the visit recording.  I have personally performed the services described in this document as transcribed by the above individual, and it is both accurate and complete.  BERE Elmore  1/17/2024  08:07 EST    Note to patient: The 21st Century Cures Act makes medical notes like these available to patients in the interest of transparency. However, be advised this is a medical document. It is intended as peer to peer communication. It is written in medical language and may contain abbreviations or verbiage that are unfamiliar. It may appear blunt or direct. Medical documents are intended to carry relevant information, facts as evident, and the clinical opinion of the provider.

## 2024-01-16 NOTE — LETTER
"January 17, 2024     DOMONIQUE Wright  [address] [place] [address]    Patient: Naima Paul   YOB: 1965   Date of Visit: 1/16/2024       Dear DOMONIQUE Wright    Naima Paul was in my office today. Below is a copy of my note.    If you have questions, please do not hesitate to call me. I look forward to following Naima along with you.         Sincerely,        BERE Elmore        CC: BERE Osborn    Subjective:     Patient ID: Naima Paul is a 58 y.o. female.    CC:   Chief Complaint   Patient presents with   • Chronic Venous Sinus Thrombosis   • Headache   • Cognitive Communication Disorder       HPI:   History of Present Illness  Today, 01/16/2024-  This is a pleasant 58-year-old female who presents for a 1-year neurology follow-up on cognitive communication disorder, chronic cerebral venous sinus thrombosis, and headaches all present since 03/2022. She is on anticoagulation. We referred her to speech language pathology for additional exercises to help with her symptoms. She is on Eliquis lifelong. Headaches have been minimal. She has been using Tylenol and gabapentin as needed. She is following with psychiatry for attention deficit hyperactivity disorder, and she is currently on methylphenidate ER. She is also on medications for anxiety and depression, all managed by psychiatry. She is here for follow-up and refills on medications today. She is accompanied by her  during today's visit.    Today, she reports her headaches are \"okay.\" She states she still has pressure on the right side of her head intermittently. She cannot bend over and straighten back up without getting lightheaded. She denies any pain, but she feels lightheaded with quick position changes. She notes she does not drink enough water. She denies any severe headaches since the blood clots. She denies any sharp or stabbing headaches. She states she has not noticed a pattern to " "the intermittent pressure in the right side of her head. She takes Tylenol when she gets the head pain and confirms it is helpful.    She states her thinking is sometimes \"slow.\" She confirms speech therapy went well, and she found it helpful. She is still using the tools that were recommended, and it has been helpful.     She denies any symptoms of stroke including weakness, numbness, tingling, slurred speech, or loss of vision. She states she has a \"spot\" in her vision where she cannot see at times. She notes her doctor told her \"everything had healed\" from when she had COVID-19, but she still has the spot that she cannot see. She still completes annual eye exams. She denies any swelling behind her eyes.    She confirms she is still following with psychiatry and counseling, BERE Chang, and that she is prescribed medications. She states she sleeps \"okay\" overall.    Her  reports she has been \"off\" the last couple of weeks. He states she \"was not acting right.\" She was different and did not seem herself. He notes her routine has changed, and she is not as organized. She has also been driving slowly.    Prior extensive neurological workup & history:  She was admitted to [facility] from 03/30/2022 until 04/03/2022 for symptoms of right (laterality clarified by patient) arm weakness, paresthesias, and syncope. She had been diagnosed with COVID-19 on 02/15/2022, and her symptoms had improved significantly. She developed acute episodes of numbness that waxed, and waned, and had some transient vision loss, and syncope. At home, her  found her unresponsive on the floor. She was admitted, and underwent neuroimaging with MRI of the brain without contrast showing no acute stroke or acute intracranial abnormalities. MRA venogram completed on 03/31/2022 showed right transverse, and right sigmoid sinus, venous sinus thrombosis. She also underwent a CT cerebral perfusion with and without " contrast, which showed no ischemia. She underwent a CTA of the neck which showed no significant abnormalities, and CTA of the head showed no significant abnormalities. She was started on heparin, and was treated with gabapentin for some headaches she was experiencing. She was discharged home on Eliquis.     During her hospitalization, she also underwent echocardiogram with agitated saline, and this showed a normal ejection fraction of 61 to 65 percent with negative bubble study. Inpatient neurology consultation was completed by Dr. Carbajal, neurology, and her recommendations included the Eliquis 5 mg twice daily for a minimum of 3 months, continuing gabapentin 300 mg 3 times daily for her headaches, and avoiding over-the-counter medications for her headaches to avoid overuse. She also underwent an EEG at the time, and this was normal.     The patient states that 1 month before being admitted to the hospital she had contracted COVID-19.      The patient denies previous thrombus, DVT or PE. She denies family history of bleeding or clotting disorders.      She has anaphylactic reaction to Motrin. She states she has never had any trouble taking aspirin.     She was evaluated by Dr. Davion De Los Santos with [facility] on 07/25/2022. He reviewed her MRA venogram completed on 07/06/2022, and this did show persistent loss of signal intensity within the right transverse sinus and right sigmoid sinus consistent with persistent venous sinus thrombosis. This was completed on 07/06/2022 and compared to 03/31/2022. He felt that the thrombosis of the superior sagittal sinus and right transverse sigmoid sinus were likely attributable to hypercoagulability and recent COVID-19 infection. He recommended that she continue Eliquis. He also noted that this is likely her new baseline. She does not require any surgical intervention.      She has a history of sammy COVID-19 prior to her hospitalization in March 2022.      The patient's highest level of education is some college. She is retired. 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    Chronic cerebral venous sinus thrombosis along with headaches present since March 2022. She is on anticoagulation. She has reported some issues with short-term memory. Vitamin B12, folate, and methylmalonic acid levels were checked in 2022 and within normal limits.      She confirms she is still taking Eliquis. She denies any unusual bruising or bleeding. She denies any blood in her stool, urine, or nosebleeds.    She completed SLP for cognitive rehab in 2023 and found this helpful.    Updated eye exam annually.    The following portions of the patient's history were reviewed and updated as appropriate: allergies, current medications, past family history, past medical history, past social history, past surgical history, and problem list.    Past Medical History:   Diagnosis Date   • Anemia    • Anxiety    • CTS (carpal tunnel syndrome) March 1994   • Depression    • Diabetes    • Headache, migraine    • Hypertension    • Obstructive sleep apnea syndrome 08/07/2022   • Other headache syndrome 02/16/2023   • Peripheral neuropathy    • Shingles    • Sleep apnea    • Syncope and collapse 03/30/2022       Past Surgical History:   Procedure Laterality Date   • ADENOIDECTOMY     • CHOLECYSTECTOMY     • COLONOSCOPY  2016   • DIAGNOSTIC LAPAROSCOPY     • ENDOMETRIAL ABLATION     • TONSILLECTOMY         Social History     Socioeconomic History   • Marital status:    Tobacco Use   • Smoking status: Never   • Smokeless tobacco: Never   Vaping Use   • Vaping Use: Never used   Substance and Sexual Activity   • Alcohol use: Yes     Alcohol/week: 2.0 standard drinks of alcohol     Types: 2 Drinks containing 0.5 oz of alcohol per week     Comment: occ 2 per week   • Drug use: Never   • Sexual activity: Yes     Partners: Male     Birth control/protection: Post-menopausal       Family History  "  Problem Relation Age of Onset   • Hypertension Mother    • Cancer Mother    • Seizures Mother    • Drug abuse Father    • Hypertension Father    • Cancer Father    • Alcohol abuse Father    • Depression Father           Current Outpatient Medications:   •  amphetamine-dextroamphetamine (Adderall) 20 MG tablet, Take 1 tablet by mouth Daily., Disp: 30 tablet, Rfl: 0  •  apixaban (Eliquis) 5 MG tablet tablet, Take 1 tablet by mouth Every 12 (Twelve) Hours., Disp: 180 tablet, Rfl: 3  •  buPROPion XL (WELLBUTRIN XL) 300 MG 24 hr tablet, Take 1 tablet by mouth Every Morning., Disp: 30 tablet, Rfl: 1  •  busPIRone (BUSPAR) 15 MG tablet, Take 1 tablet by mouth 2 (Two) Times a Day., Disp: 60 tablet, Rfl: 1  •  escitalopram (LEXAPRO) 20 MG tablet, Take 1 tablet by mouth Daily., Disp: 30 tablet, Rfl: 1  •  loratadine (CLARITIN) 10 MG tablet, Take 1 tablet by mouth Daily., Disp: , Rfl:   •  Methylphenidate HCl ER, PM, (Jornay PM) 60 MG capsule sustained-release 24 hr, Take 60 mg by mouth Every Night., Disp: 30 capsule, Rfl: 0  •  metoprolol succinate XL (TOPROL-XL) 50 MG 24 hr tablet, metoprolol succinate ER 50 mg tablet,extended release 24 hr  TAKE 1 TABLET BY MOUTH ONCE DAILY \"PT MUST BE SEEN FOR ADDITIONAL REFILS\", Disp: , Rfl:   •  montelukast (SINGULAIR) 10 MG tablet, Take 1 tablet by mouth Every Night., Disp: , Rfl:   •  QUEtiapine (SEROquel) 25 MG tablet, Take 0.5-2 tablets by mouth At Night As Needed (anxiety/sleep)., Disp: 60 tablet, Rfl: 1     Review of Systems   Psychiatric/Behavioral:  Positive for decreased concentration.    All other systems reviewed and are negative.       Objective:  /74   Pulse 79   Ht 160 cm (63\")   Wt 97.5 kg (215 lb)   SpO2 98%   BMI 38.09 kg/m²     Neurologic Exam     Mental Status   Oriented to person, place, and time.   Speech: (Cognitive-communication disorder-mild)  Level of consciousness: alert  Knowledge: good.     Cranial Nerves   Cranial nerves II through XII intact. "     Motor Exam   Muscle bulk: normal  Overall muscle tone: normal    Strength   Strength 5/5 throughout.     Gait, Coordination, and Reflexes     Gait  Gait: normal    Coordination   Finger to nose coordination: normal    Tremor   Resting tremor: absent  Intention tremor: absent  Action tremor: absent    Reflexes   Right : 2+  Left : 2+      Physical Exam  Constitutional:       Appearance: Normal appearance. She is obese.      Comments: BMI 38.1   Neurological:      Mental Status: She is alert and oriented to person, place, and time.      Cranial Nerves: Cranial nerves 2-12 are intact.      Motor: Motor strength is normal.     Coordination: Finger-Nose-Finger Test normal.      Gait: Gait is intact.   Psychiatric:         Mood and Affect: Mood is anxious.         Speech: Speech is delayed (mild).         Behavior: Behavior is slowed.         Thought Content: Thought content normal.         Cognition and Memory: She exhibits impaired recent memory (mild with word findings issues at baseline).         Judgment: Judgment normal.       Yogi Cognitive Assessment the past 2 visits has been 29 out of 30 including today. 4 out of 5 for word recall.    Assessment/Plan:       Diagnoses and all orders for this visit:    1. Cognitive communication disorder (Primary)  Comments:  continue home SLP exercises-stable-monitor    2. Cerebral venous sinus thrombosis, chronic  Comments:  continue Eliquis lifelong  Orders:  -     apixaban (Eliquis) 5 MG tablet tablet; Take 1 tablet by mouth Every 12 (Twelve) Hours.  Dispense: 180 tablet; Refill: 3    3. Other headache syndrome  Comments:  rare, continue tylenol prn         She is with her  during today's visit. A few times, he has noted that she has seemed off or not quite herself. This has been over the past several weeks or month. She has not had any specific concerns. I did offer to complete MR venogram and MRI of the brain with and without contrast again today;  "however, they declined kindly. I did ask her to increase fluid intake and change positions slowly. I also asked her to keep track of when she does experience those symptoms of feeling \"off\" such as her  says, and if it is ever associated with taking the Seroquel the night before. She is on several medications for mood, and some of these medications can affect alertness. She and her  are aware. We are now going to follow up in 1 year, for reevaluation of symptoms and repeat Pratt Cognitive Assessment, or sooner if needed. They verbalize understanding and agree with plan moving forward today. They will call us with any questions or concerns prior to follow up in clinic.    Reviewed medications, potential side effects and signs and symptoms to report. Discussed risk versus benefits of treatment plan with patient and/or family-including medications, labs and radiology that may be ordered. Addressed questions and concerns during visit. Patient and/or family verbalized understanding and agree with plan.    During this visit the following were done:  Labs Reviewed []    Labs Ordered []    Radiology Reports Reviewed []    Radiology Ordered []    PCP Records Reviewed []    Referring Provider Records Reviewed []    ER Records Reviewed []    Hospital Records Reviewed []    History Obtained From Family [x]    Radiology Images Reviewed []    Other Reviewed []    Records Requested []      Transcribed from ambient dictation for BERE Elmore by Kelsey Cooney.  01/16/24   15:37 EST    Patient or patient representative verbalized consent to the visit recording.  I have personally performed the services described in this document as transcribed by the above individual, and it is both accurate and complete.  BERE Elmoer  1/17/2024  08:07 EST    Note to patient: The 21st Century Cures Act makes medical notes like these available to patients in the interest of transparency. However, be advised " this is a medical document. It is intended as peer to peer communication. It is written in medical language and may contain abbreviations or verbiage that are unfamiliar. It may appear blunt or direct. Medical documents are intended to carry relevant information, facts as evident, and the clinical opinion of the provider.

## 2024-01-18 DIAGNOSIS — F90.2 ATTENTION DEFICIT HYPERACTIVITY DISORDER (ADHD), COMBINED TYPE, MILD: ICD-10-CM

## 2024-01-18 RX ORDER — METHYLPHENIDATE HYDROCHLORIDE 60 MG/1
60 CAPSULE ORAL NIGHTLY
Qty: 30 CAPSULE | Refills: 0 | Status: SHIPPED | OUTPATIENT
Start: 2024-01-18

## 2024-01-18 NOTE — TELEPHONE ENCOUNTER
Rx Refill Note  Requested Prescriptions     Pending Prescriptions Disp Refills    Methylphenidate HCl ER, PM, (Jornay PM) 60 MG capsule sustained-release 24 hr 30 capsule 0     Sig: Take 60 mg by mouth Every Night.      Last office visit with prescribing clinician: 11/20/2023   Last telemedicine visit with prescribing clinician: Visit date not found   Next office visit with prescribing clinician: 1/23/2024                         Would you like a call back once the refill request has been completed: [] Yes [] No    If the office needs to give you a call back, can they leave a voicemail: [] Yes [] No    Julia Perez MA  01/18/24, 15:38 EST

## 2024-01-19 ENCOUNTER — PATIENT ROUNDING (BHMG ONLY) (OUTPATIENT)
Dept: NEUROLOGY | Facility: CLINIC | Age: 59
End: 2024-01-19
Payer: COMMERCIAL

## 2024-01-19 NOTE — PROGRESS NOTES
A My-Chart message has been sent to the patient [place] [facility]

## 2024-01-23 ENCOUNTER — OFFICE VISIT (OUTPATIENT)
Dept: BEHAVIORAL HEALTH | Facility: CLINIC | Age: 59
End: 2024-01-23
Payer: COMMERCIAL

## 2024-01-23 VITALS — HEIGHT: 63 IN | WEIGHT: 215 LBS | BODY MASS INDEX: 38.09 KG/M2

## 2024-01-23 DIAGNOSIS — F90.2 ATTENTION DEFICIT HYPERACTIVITY DISORDER (ADHD), COMBINED TYPE, MILD: Primary | ICD-10-CM

## 2024-01-23 DIAGNOSIS — F41.1 GENERALIZED ANXIETY DISORDER: ICD-10-CM

## 2024-01-23 DIAGNOSIS — Z51.81 ENCOUNTER FOR THERAPEUTIC DRUG MONITORING: ICD-10-CM

## 2024-01-23 DIAGNOSIS — F33.1 MAJOR DEPRESSIVE DISORDER, RECURRENT EPISODE, MODERATE: ICD-10-CM

## 2024-01-23 RX ORDER — ESCITALOPRAM OXALATE 20 MG/1
20 TABLET ORAL DAILY
Qty: 30 TABLET | Refills: 1 | Status: SHIPPED | OUTPATIENT
Start: 2024-01-23

## 2024-01-23 RX ORDER — ARIPIPRAZOLE 5 MG/1
5 TABLET ORAL DAILY
Qty: 30 TABLET | Refills: 1 | Status: SHIPPED | OUTPATIENT
Start: 2024-01-23

## 2024-01-23 RX ORDER — DEXTROAMPHETAMINE SACCHARATE, AMPHETAMINE ASPARTATE, DEXTROAMPHETAMINE SULFATE AND AMPHETAMINE SULFATE 5; 5; 5; 5 MG/1; MG/1; MG/1; MG/1
20 TABLET ORAL DAILY
Qty: 30 TABLET | Refills: 0 | Status: SHIPPED | OUTPATIENT
Start: 2024-01-23

## 2024-01-23 RX ORDER — METHYLPHENIDATE HYDROCHLORIDE 80 MG/1
30 CAPSULE ORAL NIGHTLY
Qty: 30 CAPSULE | Refills: 0 | Status: SHIPPED | OUTPATIENT
Start: 2024-01-23

## 2024-01-23 RX ORDER — BUSPIRONE HYDROCHLORIDE 15 MG/1
15 TABLET ORAL 2 TIMES DAILY
Qty: 60 TABLET | Refills: 1 | Status: SHIPPED | OUTPATIENT
Start: 2024-01-23

## 2024-01-23 RX ORDER — BUPROPION HYDROCHLORIDE 300 MG/1
300 TABLET ORAL EVERY MORNING
Qty: 30 TABLET | Refills: 1 | Status: SHIPPED | OUTPATIENT
Start: 2024-01-23

## 2024-01-23 NOTE — PROGRESS NOTES
Follow Up Office Visit    Patient Name: Naima Paul  : 1965   MRN: [id number]   Care Team: Patient Care Team:  Rubi Jackson PA as PCP - General (Physician Assistant)  Carol Rao APRN as Nurse Practitioner (Behavioral Health)         Chief Complaint:    Chief Complaint   Patient presents with    ADHD    Anxiety    Depression    Med Management       History of Present Illness: Naima Paul is a 58 y.o. female who is here today for a medication management follow up. Patient reports that she continues to struggle with motivation and focus, however, she has noticed some improvement since starting the Jornay. She also continues to feel depressed and hopeless at times. She states she often feels like she is not good enough or doing enough. She denies SI/HI today.     The following portion of the patient's history were reviewed and updated appropriately: allergies, current and past medications, family history, medical history and social history.  Subjective   Review of Systems:    Review of Systems   Psychiatric/Behavioral:  Positive for decreased concentration, depressed mood and stress. The patient is nervous/anxious.    All other systems reviewed and are negative.      Current Medications:   Current Outpatient Medications   Medication Sig Dispense Refill    amphetamine-dextroamphetamine (Adderall) 20 MG tablet Take 1 tablet by mouth Daily. 30 tablet 0    buPROPion XL (WELLBUTRIN XL) 300 MG 24 hr tablet Take 1 tablet by mouth Every Morning. 30 tablet 1    busPIRone (BUSPAR) 15 MG tablet Take 1 tablet by mouth 2 (Two) Times a Day. 60 tablet 1    escitalopram (LEXAPRO) 20 MG tablet Take 1 tablet by mouth Daily. 30 tablet 1    apixaban (Eliquis) 5 MG tablet tablet Take 1 tablet by mouth Every 12 (Twelve) Hours. 180 tablet 3    ARIPiprazole (Abilify) 5 MG tablet Take 1 tablet by mouth Daily. 30 tablet 1    loratadine (CLARITIN) 10 MG tablet Take 1 tablet by mouth Daily.      Methylphenidate HCl  "ER, PM, (Jornay PM) 80 MG capsule sustained-release 24 hr Take 30 mg by mouth Every Night. 30 capsule 0    metoprolol succinate XL (TOPROL-XL) 50 MG 24 hr tablet metoprolol succinate ER 50 mg tablet,extended release 24 hr   TAKE 1 TABLET BY MOUTH ONCE DAILY \"PT MUST BE SEEN FOR ADDITIONAL REFILS\"      montelukast (SINGULAIR) 10 MG tablet Take 1 tablet by mouth Every Night.       No current facility-administered medications for this visit.       Mental Status Exam:   Hygiene:   good  Cooperation:  Cooperative  Eye Contact:  Good  Psychomotor Behavior:  Appropriate  Affect:  Appropriate  Mood: depressed and anxious  Speech:  Normal  Thought Process:  Goal directed and Linear  Thought Content:  Normal and Mood congruent  Suicidal:  None  Homicidal:  None  Hallucinations:  None  Delusion:  None  Memory:  Intact  Orientation:  Person, Place, Time, and Situation  Reliability:  good  Insight:  Good  Judgement:  Good  Impulse Control:  Good  Physical/Medical Issues:  Yes see chart       Objective   Vital Signs:   Ht 160 cm (63\")   Wt 97.5 kg (215 lb)   BMI 38.09 kg/m²       Assessment / Plan    Diagnoses and all orders for this visit:    1. Attention deficit hyperactivity disorder (ADHD), combined type, mild (Primary)  -     amphetamine-dextroamphetamine (Adderall) 20 MG tablet; Take 1 tablet by mouth Daily.  Dispense: 30 tablet; Refill: 0  -     Methylphenidate HCl ER, PM, (Jornay PM) 80 MG capsule sustained-release 24 hr; Take 30 mg by mouth Every Night.  Dispense: 30 capsule; Refill: 0    2. Encounter for therapeutic drug monitoring  -     Compliance Drug Analysis, Ur - Urine, Clean Catch    3. Major depressive disorder, recurrent episode, moderate  -     ARIPiprazole (Abilify) 5 MG tablet; Take 1 tablet by mouth Daily.  Dispense: 30 tablet; Refill: 1  -     buPROPion XL (WELLBUTRIN XL) 300 MG 24 hr tablet; Take 1 tablet by mouth Every Morning.  Dispense: 30 tablet; Refill: 1  -     escitalopram (LEXAPRO) 20 MG tablet; " Take 1 tablet by mouth Daily.  Dispense: 30 tablet; Refill: 1    4. Generalized anxiety disorder  -     ARIPiprazole (Abilify) 5 MG tablet; Take 1 tablet by mouth Daily.  Dispense: 30 tablet; Refill: 1  -     buPROPion XL (WELLBUTRIN XL) 300 MG 24 hr tablet; Take 1 tablet by mouth Every Morning.  Dispense: 30 tablet; Refill: 1  -     busPIRone (BUSPAR) 15 MG tablet; Take 1 tablet by mouth 2 (Two) Times a Day.  Dispense: 60 tablet; Refill: 1  -     escitalopram (LEXAPRO) 20 MG tablet; Take 1 tablet by mouth Daily.  Dispense: 30 tablet; Refill: 1     Will increase Jornay and add Abilify nightly. Continue all other medication as ordered.     A psychological evaluation was conducted in order to assess past and current level of functioning. Areas assessed included, but were not limited to: perception of social support, perception of ability to face and deal with challenges in life (positive functioning), anxiety symptoms, depressive symptoms, perspective on beliefs/belief system, coping skills for stress, intelligence level,  Therapeutic rapport was established. Interventions conducted today were geared towards incorporating medication management along with support for continued therapy. Education was also provided as to the med management with this provider and what to expect in subsequent sessions.      We discussed risks, benefits, goals and side effects of the above medication and the patient was agreeable with the plan. Patient was educated on the importance of compliance with treatment and follow-up appointments. Patient is aware to contact the [facility] with any worsening of symptoms. To call for questions or concerns and return early if necessary. Patent is agreeable to go to the Emergency Department or call 911 should they begin SI/HI.      PHQ-2/PHQ-9: Depression Screening  Little Interest or Pleasure in Doing Things: 1-->several days  Feeling Down, Depressed or Hopeless: 2-->more than half the days  PHQ-2  Total Score: 3  Trouble Falling or Staying Asleep, or Sleeping Too Much: 2-->more than half the days  Feeling Tired or Having Little Energy: 1-->several days  Poor Appetite or Overeatin-->several days  Feeling Bad about Yourself - or that You are a Failure or Have Let Yourself or Your Family Down: 2-->more than half the days  Trouble Concentrating on Things, Such as Reading the Newspaper or Watching Television: 1-->several days  Moving or Speaking So Slowly that Other People Could Have Noticed? Or the Opposite - Being So Fidgety: 1-->several days  Thoughts that You Would be Better Off Dead or of Hurting Yourself in Some Way: 0-->not at all  PHQ-9: Brief Depression Severity Measure Score: 11  If You Checked Off Any Problems, How Difficult Have These Problems Made It For You to Do Your Work, Take Care of Things at Home, or Get Along with Other People?: somewhat difficult      PHQ-9 Score:   PHQ-9 Total Score: 11    Depression Screening:  Patient screened positive for depression based on a PHQ-9 score of 11 on 2024. Follow-up recommendations include: Prescribed antidepressant medication treatment and Suicide Risk Assessment performed.      Over the last two weeks, how often have you been bothered by the following problems?  Feeling nervous, anxious or on edge: Not at all  Not being able to stop or control worrying: Several days  Worrying too much about different things: More than half the days  Trouble Relaxing: Several days  Being so restless that it is hard to sit still: Not at all  Becoming easily annoyed or irritable: Not at all  Feeling afraid as if something awful might happen: Not at all  SAMIA 7 Total Score: 4  If you checked any problems, how difficult have these problems made it for you to do your work, take care of things at home, or get along with other people: Not difficult at all        Screening for Adults With ADHD - (1-6)  1. How often do you have trouble wrapping up the final details of a  project, once the challenging parts have been done?: Sometimes  2. How often do you have difficulty getting things in order when you have to do a task that requires organization?: Sometimes  3. How often do you have problems remembering appointments or obligations : Rarely  4. When you have a task that requires a lot of thought, how often do you avoid or delay getting started ?: Often  5. How often do you fidget or squirm with your hands or feet when you have to sit down for a long time?: Sometimes  6. How often do you feel overly active and compelled to do things, like you were driven by a motor?: Never  7. How often do you make careless mistakes when you have to work on a boring or difficult project?: Sometimes  8. How often do have difficulty keeping your attention when you are doing boring or repetitive work?: Sometimes  9. How often do you have difficulty concentrating on what people say to you, even when they are speaking to you: Often  10.How often do you misplace or have difficulty finding things at home or at work?: Rarely  11.How often are you distracted by activity or noise around you?: Rarely  12.How often do you leave your seat in meetings or other situations in which you are expected to remain seated?: Never  13.How often do you feel restless or fidgety?: Rarely  14.How often do you have difficulty unwinding and relaxing when you have time to yourself?: Rarely  15.How often do you find yourself talking too much when you are in social situations?: Rarely  16.When you’re in a conversation, how often do you find yourself finishing the sentences of the people you are talking to, before they can finish them themselves?: Never  17.How often do you have difficulty waiting your turn in situations when turn taking is required?: Never  18.How often do you interrupt others when they are busy?: Never        MEDS ORDERED DURING VISIT:  New Medications Ordered This Visit   Medications    ARIPiprazole (Abilify) 5 MG  tablet     Sig: Take 1 tablet by mouth Daily.     Dispense:  30 tablet     Refill:  1    amphetamine-dextroamphetamine (Adderall) 20 MG tablet     Sig: Take 1 tablet by mouth Daily.     Dispense:  30 tablet     Refill:  0    buPROPion XL (WELLBUTRIN XL) 300 MG 24 hr tablet     Sig: Take 1 tablet by mouth Every Morning.     Dispense:  30 tablet     Refill:  1    busPIRone (BUSPAR) 15 MG tablet     Sig: Take 1 tablet by mouth 2 (Two) Times a Day.     Dispense:  60 tablet     Refill:  1    escitalopram (LEXAPRO) 20 MG tablet     Sig: Take 1 tablet by mouth Daily.     Dispense:  30 tablet     Refill:  1    Methylphenidate HCl ER, PM, (Jornay PM) 80 MG capsule sustained-release 24 hr     Sig: Take 30 mg by mouth Every Night.     Dispense:  30 capsule     Refill:  0         Follow Up   Return in about 6 weeks (around 3/5/2024).  Patient was given instructions and counseling regarding her condition or for health maintenance advice. Please see specific information pulled into the AVS if appropriate.     TREATMENT PLAN/GOALS: Continue supportive psychotherapy efforts and medications as indicated. Treatment and medication options discussed during today's visit. Patient acknowledged and verbally consented to continue with current treatment plan and was educated on the importance of compliance with treatment and follow-up appointments.    MEDICATION ISSUES:  Discussed medication options and treatment plan of prescribed medication as well as the risks, benefits, and side effects including potential falls, possible impaired driving and metabolic adversities among others. Patient is agreeable to call the office with any worsening of symptoms or onset of side effects. Patient is agreeable to call 911 or go to the nearest ER should he/she begin having SI/HI.        BERE Osborn  [facility]  [address] [place] [address] [phone]    January 24,  2024 11:50 EST

## 2024-02-07 DIAGNOSIS — F90.2 ATTENTION DEFICIT HYPERACTIVITY DISORDER (ADHD), COMBINED TYPE, MILD: ICD-10-CM

## 2024-02-07 RX ORDER — METHYLPHENIDATE HYDROCHLORIDE 80 MG/1
80 CAPSULE ORAL NIGHTLY
Qty: 30 CAPSULE | Refills: 0 | Status: SHIPPED | OUTPATIENT
Start: 2024-02-07

## 2024-03-06 ENCOUNTER — OFFICE VISIT (OUTPATIENT)
Dept: BEHAVIORAL HEALTH | Facility: CLINIC | Age: 59
End: 2024-03-06
Payer: COMMERCIAL

## 2024-03-06 VITALS
SYSTOLIC BLOOD PRESSURE: 124 MMHG | DIASTOLIC BLOOD PRESSURE: 78 MMHG | HEIGHT: 63 IN | HEART RATE: 88 BPM | WEIGHT: 215 LBS | OXYGEN SATURATION: 96 % | BODY MASS INDEX: 38.09 KG/M2

## 2024-03-06 DIAGNOSIS — F90.2 ATTENTION DEFICIT HYPERACTIVITY DISORDER (ADHD), COMBINED TYPE, MILD: ICD-10-CM

## 2024-03-06 DIAGNOSIS — F33.1 MAJOR DEPRESSIVE DISORDER, RECURRENT EPISODE, MODERATE: ICD-10-CM

## 2024-03-06 DIAGNOSIS — F41.1 GENERALIZED ANXIETY DISORDER: Primary | ICD-10-CM

## 2024-03-06 PROCEDURE — 99214 OFFICE O/P EST MOD 30 MIN: CPT

## 2024-03-06 RX ORDER — METHYLPHENIDATE HYDROCHLORIDE 80 MG/1
80 CAPSULE ORAL NIGHTLY
Qty: 30 CAPSULE | Refills: 0 | Status: SHIPPED | OUTPATIENT
Start: 2024-03-06

## 2024-03-06 RX ORDER — BUPROPION HYDROCHLORIDE 300 MG/1
300 TABLET ORAL EVERY MORNING
Qty: 30 TABLET | Refills: 1 | Status: SHIPPED | OUTPATIENT
Start: 2024-03-06

## 2024-03-06 RX ORDER — ESCITALOPRAM OXALATE 20 MG/1
20 TABLET ORAL DAILY
Qty: 30 TABLET | Refills: 1 | Status: SHIPPED | OUTPATIENT
Start: 2024-03-06

## 2024-03-06 RX ORDER — ARIPIPRAZOLE 2 MG/1
2 TABLET ORAL NIGHTLY
Qty: 30 TABLET | Refills: 1 | Status: SHIPPED | OUTPATIENT
Start: 2024-03-06

## 2024-03-06 RX ORDER — BUSPIRONE HYDROCHLORIDE 15 MG/1
15 TABLET ORAL 2 TIMES DAILY
Qty: 60 TABLET | Refills: 1 | Status: SHIPPED | OUTPATIENT
Start: 2024-03-06

## 2024-03-06 RX ORDER — DEXTROAMPHETAMINE SACCHARATE, AMPHETAMINE ASPARTATE, DEXTROAMPHETAMINE SULFATE AND AMPHETAMINE SULFATE 5; 5; 5; 5 MG/1; MG/1; MG/1; MG/1
20 TABLET ORAL DAILY
Qty: 30 TABLET | Refills: 0 | Status: SHIPPED | OUTPATIENT
Start: 2024-03-06

## 2024-03-06 NOTE — PROGRESS NOTES
"  Follow Up Office Visit    Patient Name: Naima Paul  : 1965   MRN: [id number]   Care Team: Patient Care Team:  Rubi Jackson PA as PCP - General (Physician Assistant)  Carol Rao APRN as Nurse Practitioner (Behavioral Health)         Chief Complaint:    Chief Complaint   Patient presents with    ADHD    Anxiety    Depression    Med Management       History of Present Illness: Naima Paul is a 59 y.o. female who is here today for a medication management follow up. Patient reports that the Abilify has made her very tired during the day and she feels very \"sluggish\". She does feel that her focus and concentration are doing well. She denies SI/HI today.     The following portion of the patient's history were reviewed and updated appropriately: allergies, current and past medications, family history, medical history and social history.  Subjective   Review of Systems:    Review of Systems   Respiratory: Negative.     Cardiovascular: Negative.  Negative for chest pain and palpitations.   Neurological: Negative.    Psychiatric/Behavioral:  Positive for agitation, sleep disturbance, depressed mood and stress. The patient is nervous/anxious.    All other systems reviewed and are negative.      Current Medications:   Current Outpatient Medications   Medication Sig Dispense Refill    amphetamine-dextroamphetamine (Adderall) 20 MG tablet Take 1 tablet by mouth Daily. 30 tablet 0    apixaban (Eliquis) 5 MG tablet tablet Take 1 tablet by mouth Every 12 (Twelve) Hours. 180 tablet 3    buPROPion XL (WELLBUTRIN XL) 300 MG 24 hr tablet Take 1 tablet by mouth Every Morning. 30 tablet 1    busPIRone (BUSPAR) 15 MG tablet Take 1 tablet by mouth 2 (Two) Times a Day. 60 tablet 1    escitalopram (LEXAPRO) 20 MG tablet Take 1 tablet by mouth Daily. 30 tablet 1    loratadine (CLARITIN) 10 MG tablet Take 1 tablet by mouth Daily.      Methylphenidate HCl ER, PM, (Jornay PM) 80 MG capsule sustained-release 24 hr " "Take 80 mg by mouth Every Night. 30 capsule 0    metoprolol succinate XL (TOPROL-XL) 50 MG 24 hr tablet metoprolol succinate ER 50 mg tablet,extended release 24 hr   TAKE 1 TABLET BY MOUTH ONCE DAILY \"PT MUST BE SEEN FOR ADDITIONAL REFILS\"      montelukast (SINGULAIR) 10 MG tablet Take 1 tablet by mouth Every Night.      ARIPiprazole (Abilify) 2 MG tablet Take 1 tablet by mouth Every Night. 30 tablet 1     No current facility-administered medications for this visit.       Mental Status Exam:   Hygiene:   good  Cooperation:  Cooperative  Eye Contact:  Good  Psychomotor Behavior:  Appropriate  Affect:  Appropriate  Mood: depressed and anxious  Speech:  Normal  Thought Process:  Goal directed and Linear  Thought Content:  Normal and Mood congruent  Suicidal:  None  Homicidal:  None  Hallucinations:  None  Delusion:  None  Memory:  Intact  Orientation:  Person, Place, Time, and Situation  Reliability:  good  Insight:  Good  Judgement:  Good  Impulse Control:  Good  Physical/Medical Issues:  Yes see chart      Objective   Vital Signs:   /78   Pulse 88   Ht 160 cm (63\")   Wt 97.5 kg (215 lb)   SpO2 96%   BMI 38.09 kg/m²       Assessment / Plan    Diagnoses and all orders for this visit:    1. Generalized anxiety disorder (Primary)  -     ARIPiprazole (Abilify) 2 MG tablet; Take 1 tablet by mouth Every Night.  Dispense: 30 tablet; Refill: 1  -     buPROPion XL (WELLBUTRIN XL) 300 MG 24 hr tablet; Take 1 tablet by mouth Every Morning.  Dispense: 30 tablet; Refill: 1  -     busPIRone (BUSPAR) 15 MG tablet; Take 1 tablet by mouth 2 (Two) Times a Day.  Dispense: 60 tablet; Refill: 1  -     escitalopram (LEXAPRO) 20 MG tablet; Take 1 tablet by mouth Daily.  Dispense: 30 tablet; Refill: 1    2. Attention deficit hyperactivity disorder (ADHD), combined type, mild  -     amphetamine-dextroamphetamine (Adderall) 20 MG tablet; Take 1 tablet by mouth Daily.  Dispense: 30 tablet; Refill: 0  -     Methylphenidate HCl ER, PM, " (Jornay PM) 80 MG capsule sustained-release 24 hr; Take 80 mg by mouth Every Night.  Dispense: 30 capsule; Refill: 0    3. Major depressive disorder, recurrent episode, moderate  -     ARIPiprazole (Abilify) 2 MG tablet; Take 1 tablet by mouth Every Night.  Dispense: 30 tablet; Refill: 1  -     buPROPion XL (WELLBUTRIN XL) 300 MG 24 hr tablet; Take 1 tablet by mouth Every Morning.  Dispense: 30 tablet; Refill: 1  -     escitalopram (LEXAPRO) 20 MG tablet; Take 1 tablet by mouth Daily.  Dispense: 30 tablet; Refill: 1     Patient states she has been taking her Abilify in the morning. Advised to try switching it to night and see if that helps with her feeling tired and sluggish during the day. Continue all other medication as ordered.       AIMS  Facial and Oral Movements  Muscles of Facial Expression: None, normal  Lips and Perioral Area: None, normal  Jaw: None, normal  Tongue: None, normal  Extremity Movements  Upper (arms, wrists, hands, fingers): None, normal  Lower (legs, knees, ankles, toes): None, normal  Trunk Movements  Neck, shoulders, hips: None, normal  Overall Severity  Severity of abnormal movements (max 4): 0  Incapacitation due to abnormal movements: None, normal  Patient's awareness of abnormal movements (rate only patient's report): Aware, no distress  Dental Status  Current problems with teeth and/or dentures?: No  Does patient usually wear dentures?: No        PHQ-9  PHQ-2/PHQ-9: Depression Screening  Little Interest or Pleasure in Doing Things: 0-->not at all  Feeling Down, Depressed or Hopeless: 1-->several days  PHQ-2 Total Score: 1  PHQ-9: Brief Depression Severity Measure Score: 1      PHQ-9 Score:   PHQ-9 Total Score: 1    Depression Screening:  Patient screened positive for depression based on a PHQ-9 score of 1 on 3/6/2024. Follow-up recommendations include: Prescribed antidepressant medication treatment and Suicide Risk Assessment performed.        SAMIA-7  Over the last two weeks, how often  have you been bothered by the following problems?  Feeling nervous, anxious or on edge: Several days  Not being able to stop or control worrying: Several days  Worrying too much about different things: More than half the days  Trouble Relaxing: Several days  Being so restless that it is hard to sit still: Not at all  Becoming easily annoyed or irritable: Several days  Feeling afraid as if something awful might happen: Not at all  SAMIA 7 Total Score: 6  If you checked any problems, how difficult have these problems made it for you to do your work, take care of things at home, or get along with other people: Somewhat difficult      ADHD  Screening for Adults With ADHD - (1-6)  1. How often do you have trouble wrapping up the final details of a project, once the challenging parts have been done?: Rarely  2. How often do you have difficulty getting things in order when you have to do a task that requires organization?: Sometimes  3. How often do you have problems remembering appointments or obligations : Sometimes  4. When you have a task that requires a lot of thought, how often do you avoid or delay getting started ?: Often  5. How often do you fidget or squirm with your hands or feet when you have to sit down for a long time?: Often  6. How often do you feel overly active and compelled to do things, like you were driven by a motor?: Never  7. How often do you make careless mistakes when you have to work on a boring or difficult project?: Rarely  8. How often do have difficulty keeping your attention when you are doing boring or repetitive work?: Often  9. How often do you have difficulty concentrating on what people say to you, even when they are speaking to you: Often  10.How often do you misplace or have difficulty finding things at home or at work?: Rarely  11.How often are you distracted by activity or noise around you?: Often  12.How often do you leave your seat in meetings or other situations in which you are  expected to remain seated?: Never  13.How often do you feel restless or fidgety?: Never  14.How often do you have difficulty unwinding and relaxing when you have time to yourself?: Never  15.How often do you find yourself talking too much when you are in social situations?: Rarely  16.When you’re in a conversation, how often do you find yourself finishing the sentences of the people you are talking to, before they can finish them themselves?: Never  17.How often do you have difficulty waiting your turn in situations when turn taking is required?: Rarely  18.How often do you interrupt others when they are busy?: Sometimes    A psychological evaluation was conducted in order to assess past and current level of functioning. Areas assessed included, but were not limited to: perception of social support, perception of ability to face and deal with challenges in life (positive functioning), anxiety symptoms, depressive symptoms, perspective on beliefs/belief system, coping skills for stress, intelligence level,  Therapeutic rapport was established. Interventions conducted today were geared towards incorporating medication management along with support for continued therapy. Education was also provided as to the med management with this provider and what to expect in subsequent sessions.      We discussed risks, benefits, goals and side effects of the above medication and the patient was agreeable with the plan. Patient was educated on the importance of compliance with treatment and follow-up appointments. Patient is aware to contact the [facility] with any worsening of symptoms. To call for questions or concerns and return early if necessary. Patent is agreeable to go to the Emergency Department or call 911 should they begin SI/HI.    MEDS ORDERED DURING VISIT:  New Medications Ordered This Visit   Medications    ARIPiprazole (Abilify) 2 MG tablet     Sig: Take 1 tablet by mouth Every Night.     Dispense:  30 tablet      Refill:  1    amphetamine-dextroamphetamine (Adderall) 20 MG tablet     Sig: Take 1 tablet by mouth Daily.     Dispense:  30 tablet     Refill:  0    buPROPion XL (WELLBUTRIN XL) 300 MG 24 hr tablet     Sig: Take 1 tablet by mouth Every Morning.     Dispense:  30 tablet     Refill:  1    busPIRone (BUSPAR) 15 MG tablet     Sig: Take 1 tablet by mouth 2 (Two) Times a Day.     Dispense:  60 tablet     Refill:  1    escitalopram (LEXAPRO) 20 MG tablet     Sig: Take 1 tablet by mouth Daily.     Dispense:  30 tablet     Refill:  1    Methylphenidate HCl ER, PM, (Jornay PM) 80 MG capsule sustained-release 24 hr     Sig: Take 80 mg by mouth Every Night.     Dispense:  30 capsule     Refill:  0         Follow Up   Return in about 6 weeks (around 4/17/2024).  Patient was given instructions and counseling regarding her condition or for health maintenance advice. Please see specific information pulled into the AVS if appropriate.     TREATMENT PLAN/GOALS: Continue supportive psychotherapy efforts and medications as indicated. Treatment and medication options discussed during today's visit. Patient acknowledged and verbally consented to continue with current treatment plan and was educated on the importance of compliance with treatment and follow-up appointments.    MEDICATION ISSUES:  Discussed medication options and treatment plan of prescribed medication as well as the risks, benefits, and side effects including potential falls, possible impaired driving and metabolic adversities among others. Patient is agreeable to call the office with any worsening of symptoms or onset of side effects. Patient is agreeable to call 911 or go to the nearest ER should he/she begin having SI/HI.        BERE Osborn [facility]  [address] [place] [address] [place] [id number]  [phone]    March 6, 2024 15:13 EST

## 2024-03-14 LAB — DRUGS UR: NORMAL

## 2024-03-19 DIAGNOSIS — F33.1 MAJOR DEPRESSIVE DISORDER, RECURRENT EPISODE, MODERATE: ICD-10-CM

## 2024-03-19 DIAGNOSIS — F41.1 GENERALIZED ANXIETY DISORDER: ICD-10-CM

## 2024-03-19 RX ORDER — ESCITALOPRAM OXALATE 20 MG/1
20 TABLET ORAL DAILY
Qty: 30 TABLET | Refills: 1 | OUTPATIENT
Start: 2024-03-19

## 2024-03-26 DIAGNOSIS — F90.2 ATTENTION DEFICIT HYPERACTIVITY DISORDER (ADHD), COMBINED TYPE, MILD: ICD-10-CM

## 2024-03-26 DIAGNOSIS — F33.1 MAJOR DEPRESSIVE DISORDER, RECURRENT EPISODE, MODERATE: ICD-10-CM

## 2024-03-26 DIAGNOSIS — F41.1 GENERALIZED ANXIETY DISORDER: ICD-10-CM

## 2024-03-26 RX ORDER — METHYLPHENIDATE HYDROCHLORIDE 80 MG/1
80 CAPSULE ORAL NIGHTLY
Qty: 30 CAPSULE | Refills: 0 | Status: SHIPPED | OUTPATIENT
Start: 2024-03-26

## 2024-03-26 RX ORDER — ESCITALOPRAM OXALATE 20 MG/1
20 TABLET ORAL DAILY
Qty: 30 TABLET | Refills: 1 | OUTPATIENT
Start: 2024-03-26

## 2024-03-26 NOTE — TELEPHONE ENCOUNTER
Refill for Lexapro at pharmacy. Jornay last sent 3/6    Rx Refill Note  Requested Prescriptions     Pending Prescriptions Disp Refills    Methylphenidate HCl ER, PM, (Marisol PM) 80 MG capsule sustained-release 24 hr 30 capsule 0     Sig: Take 80 mg by mouth Every Night.     Refused Prescriptions Disp Refills    escitalopram (LEXAPRO) 20 MG tablet 30 tablet 1     Sig: Take 1 tablet by mouth Daily.      Last office visit with prescribing clinician: 3/6/2024   Last telemedicine visit with prescribing clinician: Visit date not found   Next office visit with prescribing clinician: 4/29/2024                         Would you like a call back once the refill request has been completed: [] Yes [] No    If the office needs to give you a call back, can they leave a voicemail: [] Yes [] No    Julia Perez MA  03/26/24, 09:48 EDT

## 2024-03-26 NOTE — TELEPHONE ENCOUNTER
Rx Refill Note  Requested Prescriptions     Pending Prescriptions Disp Refills    Methylphenidate HCl ER, PM, (Jornay PM) 80 MG capsule sustained-release 24 hr 30 capsule 0     Sig: Take 80 mg by mouth Every Night.    escitalopram (LEXAPRO) 20 MG tablet 30 tablet 1     Sig: Take 1 tablet by mouth Daily.      Last office visit with prescribing clinician: 3/6/2024   Last telemedicine visit with prescribing clinician: Visit date not found   Next office visit with prescribing clinician: 4/29/2024    OK TO FILL?    UDS AND CSA ARE UP TO DATE.    Sallie Sharpe MA  03/26/24, 09:47 EDT

## 2024-04-08 ENCOUNTER — TELEPHONE (OUTPATIENT)
Dept: PHYSICAL THERAPY | Facility: CLINIC | Age: 59
End: 2024-04-08
Payer: COMMERCIAL

## 2024-04-08 NOTE — TELEPHONE ENCOUNTER
"  Caller: Naima Paul \"VINCENT\"    Relationship: Self         What was the call regarding:  CALL TO SCHEDULE SPEECH WITH VIKRAM, SHE TOLD HER IF HER ISSUES CAME BACK TO CALL, AND THEY ARE STARTING TO ACT UP       "

## 2024-04-10 DIAGNOSIS — R41.841 COGNITIVE COMMUNICATION DISORDER: Primary | ICD-10-CM

## 2024-04-18 ENCOUNTER — OFFICE VISIT (OUTPATIENT)
Dept: PHYSICAL THERAPY | Facility: CLINIC | Age: 59
End: 2024-04-18
Payer: COMMERCIAL

## 2024-04-18 DIAGNOSIS — R41.841 COGNITIVE COMMUNICATION DEFICIT: Primary | ICD-10-CM

## 2024-04-18 PROCEDURE — 96125 COGNITIVE TEST BY HC PRO: CPT | Performed by: SPEECH-LANGUAGE PATHOLOGIST

## 2024-04-18 NOTE — PROGRESS NOTES
Outpatient Speech Language Pathology   Adult Speech Language Cognitive Initial Evaluation  [address]       Patient Name: Naima Paul  : 1965  MRN: [id number]  Today's Date: 2024        Visit Date: 2024   Patient Active Problem List   Diagnosis    Cerebral venous sinus thrombosis, chronic    HTN (hypertension)    Syncope and collapse    Anxiety associated with depression    Leukocytosis    Personal history of COVID-19    Left arm weakness    Daily headache    COVID    Myopia    Presbyopia    Mixed anxiety and depressive disorder    Venous thrombosis    Obstructive sleep apnea syndrome    Hyperlipidemia    Hyperglycemia    Cognitive communication disorder    Other headache syndrome    Acquired thrombophilia        Past Medical History:   Diagnosis Date    Anemia     Anxiety     CTS (carpal tunnel syndrome) 1994    Depression     Diabetes     Headache, migraine     Hypertension     Obstructive sleep apnea syndrome 2022    Other headache syndrome 2023    Peripheral neuropathy     Shingles     Sleep apnea     Syncope and collapse 2022        Past Surgical History:   Procedure Laterality Date    ADENOIDECTOMY      CHOLECYSTECTOMY      COLONOSCOPY      DIAGNOSTIC LAPAROSCOPY      ENDOMETRIAL ABLATION      TONSILLECTOMY           Visit Dx:    ICD-10-CM ICD-9-CM   1. Cognitive communication deficit  R41.841 799.52            OP SLP Assessment/Plan - 24 1300          SLP Assessment    Functional Problems Speech Language- Adult/Cognition  -RB    Impact on Function: Adult Speech Language/Cognition Trouble learning or remembering new information;Poor attention to task;Decreased recall of personal information and medical history;Difficulty participating in avocational activities;Difficulty sequencing thoughts to express complex messages;Restrictions in personal and social life  -RB    Clinical Impression: Speech Language-Adult/Congnition Mild:;Cognitive Communication  Impairment  -RB    Functional Problems Comment Impacts QOL and ADL's; deficits in word-finding and attention  -RB    Clinical Impression Comments Would benefit from skilled ST  -RB    Please refer to paper survey for additional self-reported information Yes  -RB    Please refer to items scanned into chart for additional diagnostic informaiton and handouts as provided by clinician Yes  -RB    SLP Diagnosis cog/comm disorder  -RB    Prognosis Good (comment)  -RB    Patient/caregiver participated in establishment of treatment plan and goals Yes  -RB    Patient would benefit from skilled therapy intervention Yes  -RB       SLP Plan    Frequency 1x weekly  -RB    Duration 8 weeks  -RB    Planned CPT's? SLP INDIVIDUAL SPEECH THERAPY: 77247  -RB    Expected Duration of Therapy Session (SLP Eval) 45  -RB    Plan Comments Initiate POC  -RB              User Key  (r) = Recorded By, (t) = Taken By, (c) = Cosigned By      Initials Name Provider Type    RB Alee Wong, SLP Speech and Language Pathologist                     SLP SLC Evaluation - 04/18/24 1300          Communication Assessment/Intervention    Document Type evaluation  -RB    Total Evaluation Minutes, SLP 45  -RB    Subjective Information no complaints  -RB    Patient Observations cooperative;alert;agree to therapy  -RB    Patient Effort excellent  -RB    Symptoms Noted During/After Treatment none  -RB       General Information    Patient Profile Reviewed yes  -RB    Pertinent History Of Current Problem PMH: Pt lives with  and daughter (and has 2 older daughters), and she is recently retired. In March of 2022 pt experienced a cerebral venous sinus thrombosis (CVST) on the right hemisphere and had ongoing headaches, dizziness, memory, attention, and language concerns following the incident. Pt was seen for out patient ST services from 2/2023 - 7/2023. She reports that her symptoms had improved during last ST treatment, but that recently she and her family  have noticed a decline in her attention, word-finding, and complex thought organization. At baseline pt has ADHD. Pt remains independent in all ADL's.  -RB    Precautions/Limitations, Vision WFL with corrective lenses  -RB    Precautions/Limitations, Hearing WFL  -RB    Prior Level of Function-Communication WFL;cognitive-linguistic impairment   Baseline WFL; previously seen for cog/comm disorder -RB    Plans/Goals Discussed with patient  -RB    Barriers to Rehab none identified  -RB    Patient's Goals for Discharge functional cognition;functional communication  -RB    Standardized Assessment Used RBANS  -RB       Oral Motor Structure and Function    Oral Motor Structure and Function WFL  -RB    Dentition Assessment natural, present and adequate  -RB    Mucosal Quality moist, healthy  -RB       Oral Musculature and Cranial Nerve Assessment    Oral Motor General Assessment WFL  -RB       Motor Speech Assessment/Intervention    Motor Speech Function WFL  -RB       Cognitive Assessment Intervention- SLP    Cognitive Function (Cognition) mild impairment  -RB    Orientation Status (Cognition) WFL;awareness of basic personal information;person;place;time;situation  -RB    Memory (Cognitive) WFL;simple;immediate;delayed;mild impairment;mod-complex   Pt reports some difficulty with STM in complex and/or distracting environments -RB    Attention (Cognitive) mild impairment;selective;sustained;divided;attention to detail;distracting environment   Pt baseline has ADHD and notices difficulties with attention -RB    Thought Organization (Cognitive) WFL;concrete divergent;mild impairment;high level   Pt reports difficulty with word-finding and with high level thought organization in ADL's -RB    Reasoning (Cognitive) WFL;simple  -RB    Problem Solving (Cognitive) WFL;simple  -RB    Executive Function (Cognition) WFL;realistic goal setting;deficit awareness;initiation;judgement;mild impairment;time management;planning;home  management activities;complex organization   Pt reports difficulty with time management at home and with complex organizational tasks -RB    Pragmatics (Communication) WFL  -RB    Right Hemisphere Function WFL;visuo-spatial;visuo-perceptual  -RB    Cognition, Comment Would benefit from skilled ST; deficits in word-finding and pt notices difficulty with STM and baseline attention difficulties  -RB       Standardized Tests    Cognitive/Memory Tests RBANS: Repeatable Battery for the Assessment of Neuropsychological Status  -RB       RBANS- Repeatable Battery for the Assessment of Neuropsychological Status    Immediate Memory Index Score 112  -RB    Immediate Memory Qualitative Description high average  -RB    Visuospatial Index Score 109  -RB    Visuospatial Qualitative Description average  -RB    Language Index Score 99  -RB    Language Qualitative Description average  -RB    Attention Index Score 106  -RB    Attention Qualitative Description average  -RB    Delayed Memory Index Score 124  -RB    Delayed Memory Qualitative Description superior  -RB    Total Index Score 550  -RB              User Key  (r) = Recorded By, (t) = Taken By, (c) = Cosigned By      Initials Name Provider Type    Alee Sheridan, SLP Speech and Language Pathologist                      ACTIVITY  ACCURACY ASSISTANCE NEEDED   LTGs:     Pt will be able to remember information needed to function on home  tasks 90-95% of the time no cues     Pt and family will implement compensatory strategies to maximize patient’s attention abilities so patient can continue to participate in daily activities 90-95% of the time no cues        STG:    PT will utilize word finding strategies at 90-95% no cues in conversation         STG:     Pt will generate word finding strategy solutions for home tasks at 90-95% no cues         STG:    Pt will demonstrate improved ability to recall and summarize information by listening/reading information and  answering  questions/ summarzing 90-95% of the time no cues          STG:     Pt will improve executive functioning skills by using metacognitive strategies during functional tasks at 90% no cues            Certification: 4/18/2024 -7/17/2024          OP SLP Education       Row Name 04/18/24 1300       Education    Barriers to Learning No barriers identified  -RB    Education Provided Described results of evaluation;Patient expressed understanding of evaluation;Patient participated in establishing goals and treatment plan;Patient demonstrated recommended strategies;Patient requires further education on strategies, risks  -RB    Assessed Learning needs;Learning motivation;Learning preferences;Learning readiness  -RB    Learning Motivation Strong  -RB    Learning Method Explanation;Demonstration;Teach back;Written materials  -RB    Teaching Response Verbalized understanding;Demonstrated understanding;Reinforcement needed  -RB    Education Comments HEP: symptom tracker -RB              User Key  (r) = Recorded By, (t) = Taken By, (c) = Cosigned By      Initials Name Effective Dates    Alee Sheridan, ISREAL 02/12/24 -                   Carolin Carrington SLP  provided treatment under my direct supervision.    [facility] Speech Language Pathology   [address]  [place] [address]  Alee Wong MA CCC-SLP [facility] license: [id number]        PHYSICIAN: Carolin BLACKBURN    NPI: [id number]         I certify that the therapy services are furnished while this patient is under my care.  The services outlined above are required by this patient, and will be reviewed every 90 days.                PHYSICIAN:                                         DATE:      Please sign and return via fax to [phone].   Thank you,   [facility].      4/18/2024

## 2024-04-25 ENCOUNTER — TREATMENT (OUTPATIENT)
Dept: PHYSICAL THERAPY | Facility: CLINIC | Age: 59
End: 2024-04-25
Payer: COMMERCIAL

## 2024-04-25 DIAGNOSIS — R41.841 COGNITIVE COMMUNICATION DEFICIT: Primary | ICD-10-CM

## 2024-04-25 PROCEDURE — 92507 TX SP LANG VOICE COMM INDIV: CPT | Performed by: SPEECH-LANGUAGE PATHOLOGIST

## 2024-04-25 NOTE — PROGRESS NOTES
Outpatient Speech Language Pathology   Adult Speech Language Cognitive Treatment Note  [address]       Patient Name: Naima Paul  : 1965  MRN: [id number]  Today's Date: 2024        Visit Date: 2024   Patient Active Problem List   Diagnosis    Cerebral venous sinus thrombosis, chronic    HTN (hypertension)    Syncope and collapse    Anxiety associated with depression    Leukocytosis    Personal history of COVID-19    Left arm weakness    Daily headache    COVID    Myopia    Presbyopia    Mixed anxiety and depressive disorder    Venous thrombosis    Obstructive sleep apnea syndrome    Hyperlipidemia    Hyperglycemia    Cognitive communication disorder    Other headache syndrome    Acquired thrombophilia        Past Medical History:   Diagnosis Date    Anemia     Anxiety     CTS (carpal tunnel syndrome) 1994    Depression     Diabetes     Headache, migraine     Hypertension     Obstructive sleep apnea syndrome 2022    Other headache syndrome 2023    Peripheral neuropathy     Shingles     Sleep apnea     Syncope and collapse 2022        Past Surgical History:   Procedure Laterality Date    ADENOIDECTOMY      CHOLECYSTECTOMY      COLONOSCOPY      DIAGNOSTIC LAPAROSCOPY      ENDOMETRIAL ABLATION      TONSILLECTOMY           Visit Dx:    ICD-10-CM ICD-9-CM   1. Cognitive communication deficit  R41.841 799.52            OP SLP Assessment/Plan - 24 1300          SLP Assessment    Functional Problems Speech Language- Adult/Cognition  -RB    Impact on Function: Adult Speech Language/Cognition Trouble learning or remembering new information;Poor attention to task;Decreased recall of personal information and medical history;Difficulty participating in avocational activities;Difficulty sequencing thoughts to express complex messages;Restrictions in personal and social life  -RB    Clinical Impression: Speech Language-Adult/Congnition Mild:;Cognitive Communication  Impairment  -RB    Functional Problems Comment Impacts QOL and ADL's; deficits in word-finding and attention  -RB    Clinical Impression Comments Pt receptive to HEP and strategies; notes some continued difficulty with directions but overall no new cognitive concerns  -RB    Please refer to paper survey for additional self-reported information Yes  -RB    Please refer to items scanned into chart for additional diagnostic informaiton and handouts as provided by clinician Yes  -RB    SLP Diagnosis cog/comm disorder  -RB    Prognosis Good (comment)  -RB    Patient/caregiver participated in establishment of treatment plan and goals Yes  -RB    Patient would benefit from skilled therapy intervention Yes  -RB       SLP Plan    Frequency 1x weekly  -RB    Duration 7 weeks  -RB    Planned CPT's? SLP INDIVIDUAL SPEECH THERAPY: 92286  -RB    Expected Duration of Therapy Session (SLP Eval) 45  -RB    Plan Comments Continue POC  -RB              User Key  (r) = Recorded By, (t) = Taken By, (c) = Cosigned By      Initials Name Provider Type    Alee Sheridan SLP Speech and Language Pathologist                    Pain: 0     Subjective: Had a good week overall; looking forward to the [facility] next week        ACTIVITY  ACCURACY ASSISTANCE NEEDED   LTGs:     Pt will be able to remember information needed to function on home  tasks 90-95% of the time no cues     Pt and family will implement compensatory strategies to maximize patient’s attention abilities so patient can continue to participate in daily activities 90-95% of the time no cues       Targeted functional recall tasks               Modeled compensatory strategies     85-90%     No cues    STG:    PT will utilize word finding strategies at 90-95% no cues in conversation        Targeted in conversation     85-90%     No cues    STG:     Pt will generate word finding strategy solutions for home tasks at 90-95% no cues        Modeled strategies for word-finding; sent HEP      STG:    Pt will demonstrate improved ability to recall and summarize information by listening/reading information and  answering questions/ summarzing 90-95% of the time no cues         Targeted visual recall tasks     Article (cows in groups): 95%    Choose a vacation: 100%    Podcast (criminal profiling):      No cues    STG:     Pt will improve executive functioning skills by using metacognitive strategies during functional tasks at 90% no cues       Modeled and targeted self-awareness             Certification: 4/18/2024 -7/17/2024          OP SLP Education       Row Name 04/18/24 1300       Education    Barriers to Learning No barriers identified  -RB    Education Provided Described results of evaluation;Patient expressed understanding of evaluation;Patient participated in establishing goals and treatment plan;Patient demonstrated recommended strategies;Patient requires further education on strategies, risks  -RB    Assessed Learning needs;Learning motivation;Learning preferences;Learning readiness  -RB    Learning Motivation Strong  -RB    Learning Method Explanation;Demonstration;Teach back;Written materials  -RB    Teaching Response Verbalized understanding;Demonstrated understanding;Reinforcement needed  -RB    Education Comments HEP: word-finding, association -RB              User Key  (r) = Recorded By, (t) = Taken By, (c) = Cosigned By      Initials Name Effective Dates    Alee Sheridan, ISREAL 02/12/24 -                   Carolin Carrington SLP  provided treatment under my direct supervision.     Alee Wong MA CCC-SLP [facility]  [place] license: [id number]      4/25/2024

## 2024-04-29 ENCOUNTER — OFFICE VISIT (OUTPATIENT)
Dept: BEHAVIORAL HEALTH | Facility: CLINIC | Age: 59
End: 2024-04-29
Payer: COMMERCIAL

## 2024-04-29 VITALS
WEIGHT: 211 LBS | DIASTOLIC BLOOD PRESSURE: 82 MMHG | HEART RATE: 64 BPM | OXYGEN SATURATION: 99 % | SYSTOLIC BLOOD PRESSURE: 128 MMHG | HEIGHT: 63 IN | BODY MASS INDEX: 37.39 KG/M2

## 2024-04-29 DIAGNOSIS — F90.2 ATTENTION DEFICIT HYPERACTIVITY DISORDER (ADHD), COMBINED TYPE, MILD: Primary | ICD-10-CM

## 2024-04-29 DIAGNOSIS — F41.1 GENERALIZED ANXIETY DISORDER: ICD-10-CM

## 2024-04-29 DIAGNOSIS — F33.1 MAJOR DEPRESSIVE DISORDER, RECURRENT EPISODE, MODERATE: ICD-10-CM

## 2024-04-29 PROCEDURE — 99214 OFFICE O/P EST MOD 30 MIN: CPT

## 2024-04-29 RX ORDER — METHYLPHENIDATE HYDROCHLORIDE 80 MG/1
80 CAPSULE ORAL NIGHTLY
Qty: 30 CAPSULE | Refills: 0 | Status: SHIPPED | OUTPATIENT
Start: 2024-04-29

## 2024-04-29 RX ORDER — ARIPIPRAZOLE 2 MG/1
2 TABLET ORAL NIGHTLY
Qty: 90 TABLET | Refills: 0 | Status: SHIPPED | OUTPATIENT
Start: 2024-04-29

## 2024-04-29 RX ORDER — BUPROPION HYDROCHLORIDE 300 MG/1
300 TABLET ORAL EVERY MORNING
Qty: 90 TABLET | Refills: 0 | Status: SHIPPED | OUTPATIENT
Start: 2024-04-29

## 2024-04-29 RX ORDER — BUSPIRONE HYDROCHLORIDE 15 MG/1
15 TABLET ORAL 2 TIMES DAILY
Qty: 180 TABLET | Refills: 0 | Status: SHIPPED | OUTPATIENT
Start: 2024-04-29

## 2024-04-29 RX ORDER — DEXTROAMPHETAMINE SACCHARATE, AMPHETAMINE ASPARTATE, DEXTROAMPHETAMINE SULFATE AND AMPHETAMINE SULFATE 5; 5; 5; 5 MG/1; MG/1; MG/1; MG/1
20 TABLET ORAL DAILY
Qty: 30 TABLET | Refills: 0 | Status: SHIPPED | OUTPATIENT
Start: 2024-04-29

## 2024-04-29 RX ORDER — ESCITALOPRAM OXALATE 20 MG/1
20 TABLET ORAL DAILY
Qty: 90 TABLET | Refills: 0 | Status: SHIPPED | OUTPATIENT
Start: 2024-04-29

## 2024-04-29 NOTE — PROGRESS NOTES
Follow Up Office Visit    Patient Name: Namia Paul  : 1965   MRN: [id number]   Care Team: Patient Care Team:  Rubi Jackson PA as PCP - General (Physician Assistant)  Carol Rao APRN as Nurse Practitioner (Behavioral Health)         Chief Complaint:    Chief Complaint   Patient presents with    ADHD    Anxiety    Depression    Med Management       History of Present Illness: Naima Paul is a 59 y.o. female who is here today for a medication management follow up. Patient reports that overall things have been going well and since switching her Abilify to nightly everything seems to be working great. She finally feels like she is on a good medication regimen. Her focus and concentration are doing good and her anxiety and mood are managed well. She did not see the need to make any changes and did not have any medication concerns at today's visit. She denies SI/HI today.     The following portion of the patient's history were reviewed and updated appropriately: allergies, current and past medications, family history, medical history and social history. MIRYAM reviewed and appropriate.   Subjective   Review of Systems:    Review of Systems   Respiratory: Negative.     Cardiovascular: Negative.  Negative for chest pain and palpitations.   Neurological: Negative.    Psychiatric/Behavioral:  Positive for stress. The patient is nervous/anxious.        Current Medications:   Current Outpatient Medications   Medication Sig Dispense Refill    amphetamine-dextroamphetamine (Adderall) 20 MG tablet Take 1 tablet by mouth Daily. 30 tablet 0    apixaban (Eliquis) 5 MG tablet tablet Take 1 tablet by mouth Every 12 (Twelve) Hours. 180 tablet 3    ARIPiprazole (Abilify) 2 MG tablet Take 1 tablet by mouth Every Night. 90 tablet 0    buPROPion XL (WELLBUTRIN XL) 300 MG 24 hr tablet Take 1 tablet by mouth Every Morning. 90 tablet 0    busPIRone (BUSPAR) 15 MG tablet Take 1 tablet by mouth 2 (Two) Times a Day.  "180 tablet 0    escitalopram (LEXAPRO) 20 MG tablet Take 1 tablet by mouth Daily. 90 tablet 0    loratadine (CLARITIN) 10 MG tablet Take 1 tablet by mouth Daily.      Methylphenidate HCl ER, PM, (Jornay PM) 80 MG capsule sustained-release 24 hr Take 80 mg by mouth Every Night. 30 capsule 0    metoprolol succinate XL (TOPROL-XL) 50 MG 24 hr tablet metoprolol succinate ER 50 mg tablet,extended release 24 hr   TAKE 1 TABLET BY MOUTH ONCE DAILY \"PT MUST BE SEEN FOR ADDITIONAL REFILS\"      montelukast (SINGULAIR) 10 MG tablet Take 1 tablet by mouth Every Night.       No current facility-administered medications for this visit.       Mental Status Exam:   Hygiene:   good  Cooperation:  Cooperative  Eye Contact:  Good  Psychomotor Behavior:  Appropriate  Affect:  Appropriate  Mood: normal  Speech:  Normal  Thought Process:  Goal directed and Linear  Thought Content:  Normal and Mood congruent  Suicidal:  None  Homicidal:  None  Hallucinations:  None  Delusion:  None  Memory:  Intact  Orientation:  Person, Place, Time, and Situation  Reliability:  good  Insight:  Good  Judgement:  Good  Impulse Control:  Good  Physical/Medical Issues:  Yes see chart       Objective   Vital Signs:   /82   Pulse 64   Ht 160 cm (63\")   Wt 95.7 kg (211 lb)   SpO2 99%   BMI 37.38 kg/m²       Assessment / Plan    Diagnoses and all orders for this visit:    1. Attention deficit hyperactivity disorder (ADHD), combined type, mild (Primary)  -     amphetamine-dextroamphetamine (Adderall) 20 MG tablet; Take 1 tablet by mouth Daily.  Dispense: 30 tablet; Refill: 0  -     Methylphenidate HCl ER, PM, (Jornay PM) 80 MG capsule sustained-release 24 hr; Take 80 mg by mouth Every Night.  Dispense: 30 capsule; Refill: 0    2. Major depressive disorder, recurrent episode, moderate  -     ARIPiprazole (Abilify) 2 MG tablet; Take 1 tablet by mouth Every Night.  Dispense: 90 tablet; Refill: 0  -     buPROPion XL (WELLBUTRIN XL) 300 MG 24 hr tablet; " Take 1 tablet by mouth Every Morning.  Dispense: 90 tablet; Refill: 0  -     escitalopram (LEXAPRO) 20 MG tablet; Take 1 tablet by mouth Daily.  Dispense: 90 tablet; Refill: 0    3. Generalized anxiety disorder  -     ARIPiprazole (Abilify) 2 MG tablet; Take 1 tablet by mouth Every Night.  Dispense: 90 tablet; Refill: 0  -     buPROPion XL (WELLBUTRIN XL) 300 MG 24 hr tablet; Take 1 tablet by mouth Every Morning.  Dispense: 90 tablet; Refill: 0  -     busPIRone (BUSPAR) 15 MG tablet; Take 1 tablet by mouth 2 (Two) Times a Day.  Dispense: 180 tablet; Refill: 0  -     escitalopram (LEXAPRO) 20 MG tablet; Take 1 tablet by mouth Daily.  Dispense: 90 tablet; Refill: 0     Patient appears to be doing well on current medication. No issues reported and no indication for change. Will continue medication as ordered.     History and physical exam exhibit continued safe and appropriate use of controlled substance.    As part of patient's treatment plan I am prescribing a controlled substance.  The patient has been made aware of the appropriate use of such medications and potential for dependence and/or overdose.  It has also been made clear that these medications are for use by this patient only, without concomitant use of alcohol or other substances, unless prescribed.    Patient has completed a prescribing agreement detailing terms of continued prescribing of controlled substances, including monitoring MIRYAM reports, urine drug screening, and pill counts if necessary.  Patient is aware that inappropriate use will result in cessation of prescribing such medications      AIMS  Facial and Oral Movements  Muscles of Facial Expression: None, normal  Lips and Perioral Area: None, normal  Jaw: None, normal  Tongue: None, normal  Extremity Movements  Upper (arms, wrists, hands, fingers): None, normal  Lower (legs, knees, ankles, toes): None, normal  Trunk Movements  Neck, shoulders, hips: None, normal  Overall Severity  Severity of  abnormal movements (max 4): 0  Incapacitation due to abnormal movements: None, normal  Patient's awareness of abnormal movements (rate only patient's report): Aware, no distress  Dental Status  Current problems with teeth and/or dentures?: No  Does patient usually wear dentures?: No      PHQ-9  PHQ-2/PHQ-9: Depression Screening  Little Interest or Pleasure in Doing Things: 1-->several days  Feeling Down, Depressed or Hopeless: 1-->several days  PHQ-2 Total Score: 2  Trouble Falling or Staying Asleep, or Sleeping Too Much: 1-->several days  Feeling Tired or Having Little Energy: 0-->not at all  Poor Appetite or Overeatin-->not at all  Feeling Bad about Yourself - or that You are a Failure or Have Let Yourself or Your Family Down: 2-->more than half the days  Trouble Concentrating on Things, Such as Reading the Newspaper or Watching Television: 2-->more than half the days  Moving or Speaking So Slowly that Other People Could Have Noticed? Or the Opposite - Being So Fidgety: 1-->several days  Thoughts that You Would be Better Off Dead or of Hurting Yourself in Some Way: 0-->not at all  PHQ-9: Brief Depression Severity Measure Score: 8  If You Checked Off Any Problems, How Difficult Have These Problems Made It For You to Do Your Work, Take Care of Things at Home, or Get Along with Other People?: not difficult at all      PHQ-9 Score:   PHQ-9 Total Score: 8    Depression Screening:  Patient screened positive for depression based on a PHQ-9 score of 8 on 2024. Follow-up recommendations include: Prescribed antidepressant medication treatment and Suicide Risk Assessment performed.        SAMIA-7  Over the last two weeks, how often have you been bothered by the following problems?  Feeling nervous, anxious or on edge: Several days  Not being able to stop or control worrying: More than half the days  Worrying too much about different things: More than half the days  Trouble Relaxing: Several days  Being so restless  that it is hard to sit still: Not at all  Becoming easily annoyed or irritable: Several days  Feeling afraid as if something awful might happen: Not at all  SAMIA 7 Total Score: 7  If you checked any problems, how difficult have these problems made it for you to do your work, take care of things at home, or get along with other people: Not difficult at all      ADHD  Screening for Adults With ADHD - (1-6)  1. How often do you have trouble wrapping up the final details of a project, once the challenging parts have been done?: Rarely  2. How often do you have difficulty getting things in order when you have to do a task that requires organization?: Sometimes  3. How often do you have problems remembering appointments or obligations : Sometimes  4. When you have a task that requires a lot of thought, how often do you avoid or delay getting started ?: Often  5. How often do you fidget or squirm with your hands or feet when you have to sit down for a long time?: Sometimes  6. How often do you feel overly active and compelled to do things, like you were driven by a motor?: Never  7. How often do you make careless mistakes when you have to work on a boring or difficult project?: Rarely  8. How often do have difficulty keeping your attention when you are doing boring or repetitive work?: Sometimes  9. How often do you have difficulty concentrating on what people say to you, even when they are speaking to you: Often  10.How often do you misplace or have difficulty finding things at home or at work?: Rarely  11.How often are you distracted by activity or noise around you?: Sometimes  12.How often do you leave your seat in meetings or other situations in which you are expected to remain seated?: Never  13.How often do you feel restless or fidgety?: Rarely  14.How often do you have difficulty unwinding and relaxing when you have time to yourself?: Rarely  15.How often do you find yourself talking too much when you are in social  situations?: Never  16.When you’re in a conversation, how often do you find yourself finishing the sentences of the people you are talking to, before they can finish them themselves?: Never  17.How often do you have difficulty waiting your turn in situations when turn taking is required?: Rarely  18.How often do you interrupt others when they are busy?: Rarely    A psychological evaluation was conducted in order to assess past and current level of functioning. Areas assessed included, but were not limited to: perception of social support, perception of ability to face and deal with challenges in life (positive functioning), anxiety symptoms, depressive symptoms, perspective on beliefs/belief system, coping skills for stress, intelligence level,  Therapeutic rapport was established. Interventions conducted today were geared towards incorporating medication management along with support for continued therapy. Education was also provided as to the med management with this provider and what to expect in subsequent sessions.      We discussed risks, benefits, goals and side effects of the above medication and the patient was agreeable with the plan. Patient was educated on the importance of compliance with treatment and follow-up appointments. Patient is aware to contact the [facility] with any worsening of symptoms. To call for questions or concerns and return early if necessary. Patent is agreeable to go to the Emergency Department or call 911 should they begin SI/HI.    MEDS ORDERED DURING VISIT:  New Medications Ordered This Visit   Medications    amphetamine-dextroamphetamine (Adderall) 20 MG tablet     Sig: Take 1 tablet by mouth Daily.     Dispense:  30 tablet     Refill:  0    ARIPiprazole (Abilify) 2 MG tablet     Sig: Take 1 tablet by mouth Every Night.     Dispense:  90 tablet     Refill:  0    buPROPion XL (WELLBUTRIN XL) 300 MG 24 hr tablet     Sig: Take 1 tablet by mouth Every Morning.     Dispense:  90  tablet     Refill:  0    busPIRone (BUSPAR) 15 MG tablet     Sig: Take 1 tablet by mouth 2 (Two) Times a Day.     Dispense:  180 tablet     Refill:  0    escitalopram (LEXAPRO) 20 MG tablet     Sig: Take 1 tablet by mouth Daily.     Dispense:  90 tablet     Refill:  0    Methylphenidate HCl ER, PM, (Jornay PM) 80 MG capsule sustained-release 24 hr     Sig: Take 80 mg by mouth Every Night.     Dispense:  30 capsule     Refill:  0         Follow Up   Return in about 3 months (around 7/29/2024).  Patient was given instructions and counseling regarding her condition or for health maintenance advice. Please see specific information pulled into the AVS if appropriate.     TREATMENT PLAN/GOALS: Continue supportive psychotherapy efforts and medications as indicated. Treatment and medication options discussed during today's visit. Patient acknowledged and verbally consented to continue with current treatment plan and was educated on the importance of compliance with treatment and follow-up appointments.    MEDICATION ISSUES:  Discussed medication options and treatment plan of prescribed medication as well as the risks, benefits, and side effects including potential falls, possible impaired driving and metabolic adversities among others. Patient is agreeable to call the office with any worsening of symptoms or onset of side effects. Patient is agreeable to call 911 or go to the nearest ER should he/she begin having SI/HI.        [facility]  [address] [place] [address] [address]ONG [id number]  [phone]    April 30, 2024 09:07 EDT

## 2024-05-02 ENCOUNTER — TREATMENT (OUTPATIENT)
Dept: PHYSICAL THERAPY | Facility: CLINIC | Age: 59
End: 2024-05-02
Payer: COMMERCIAL

## 2024-05-02 DIAGNOSIS — R41.841 COGNITIVE COMMUNICATION DEFICIT: Primary | ICD-10-CM

## 2024-05-02 PROCEDURE — 92507 TX SP LANG VOICE COMM INDIV: CPT | Performed by: SPEECH-LANGUAGE PATHOLOGIST

## 2024-05-02 NOTE — PROGRESS NOTES
Outpatient Speech Language Pathology   Adult Speech Language Cognitive Treatment Note  [address]       Patient Name: Naima Paul  : 1965  MRN: [id number]  Today's Date: 2024        Visit Date: 2024   Patient Active Problem List   Diagnosis    Cerebral venous sinus thrombosis, chronic    HTN (hypertension)    Syncope and collapse    Anxiety associated with depression    Leukocytosis    Personal history of COVID-19    Left arm weakness    Daily headache    COVID    Myopia    Presbyopia    Mixed anxiety and depressive disorder    Venous thrombosis    Obstructive sleep apnea syndrome    Hyperlipidemia    Hyperglycemia    Cognitive communication disorder    Other headache syndrome    Acquired thrombophilia        Past Medical History:   Diagnosis Date    Anemia     Anxiety     CTS (carpal tunnel syndrome) 1994    Depression     Diabetes     Headache, migraine     Hypertension     Obstructive sleep apnea syndrome 2022    Other headache syndrome 2023    Peripheral neuropathy     Shingles     Sleep apnea     Syncope and collapse 2022        Past Surgical History:   Procedure Laterality Date    ADENOIDECTOMY      CHOLECYSTECTOMY      COLONOSCOPY      DIAGNOSTIC LAPAROSCOPY      ENDOMETRIAL ABLATION      TONSILLECTOMY           Visit Dx:    ICD-10-CM ICD-9-CM   1. Cognitive communication deficit  R41.841 799.52            OP SLP Assessment/Plan - 24 1300          SLP Assessment    Functional Problems Speech Language- Adult/Cognition  -RB    Impact on Function: Adult Speech Language/Cognition Trouble learning or remembering new information;Poor attention to task;Decreased recall of personal information and medical history;Difficulty participating in avocational activities;Difficulty sequencing thoughts to express complex messages;Restrictions in personal and social life  -RB    Clinical Impression: Speech Language-Adult/Congnition Mild:;Cognitive Communication  Impairment  -RB    Functional Problems Comment Impacts QOL and ADL's; deficits in word-finding and attention  -RB    Clinical Impression Comments Pt receptive to HEP and strategies; pt reports word finding gave her difficulty this week, pt demonstrating exercises and strategies in sessions, modeled time pressure  -RB    Please refer to paper survey for additional self-reported information Yes  -RB    Please refer to items scanned into chart for additional diagnostic informaiton and handouts as provided by clinician Yes  -RB    SLP Diagnosis cog/comm disorder  -RB    Prognosis Good (comment)  -RB    Patient/caregiver participated in establishment of treatment plan and goals Yes  -RB    Patient would benefit from skilled therapy intervention Yes  -RB       SLP Plan    Frequency 1x weekly  -RB    Duration 6 weeks  -RB    Planned CPT's? SLP INDIVIDUAL SPEECH THERAPY: 32282  -RB    Expected Duration of Therapy Session (SLP Haris) 45  -RB    Plan Comments Continue POC  -RB              User Key  (r) = Recorded By, (t) = Taken By, (c) = Cosigned By      Initials Name Provider Type    RB Alee Wong, SLP Speech and Language Pathologist                    Pain: 0     Subjective: Pt reports word finding episodes this week        ACTIVITY  ACCURACY ASSISTANCE NEEDED   LTGs:     Pt will be able to remember information needed to function on home  tasks 90-95% of the time no cues     Pt and family will implement compensatory strategies to maximize patient’s attention abilities so patient can continue to participate in daily activities 90-95% of the time no cues       Targeted functional recall tasks               Modeled compensatory strategies     85-90%                  85-90%     No cues    STG:    PT will utilize word finding strategies at 90-95% no cues in conversation        Targeted in conversation     85-90%     No cues    STG:     Pt will generate word finding strategy solutions for home tasks at 90-95% no cues         reviewed strategies for word-finding   85% Min cues   STG:    Pt will demonstrate improved ability to recall and summarize information by listening/reading information and  answering questions/ summarzing 90-95% of the time no cues         Targeted visual and auditory recall tasks     Article: 95%    Medicine task: 100%    Podcast 6 mins: 95%:  Name recall: 4/4 5 min delay   appt info: 4/4 5 min delay  Recipe: 100%     No cues    STG:     Pt will improve executive functioning skills by using metacognitive strategies during functional tasks at 90% no cues       Modeled and targeted self-awareness, time pressure             Certification: 4/18/2024 -7/17/2024          OP SLP Education       Row Name 04/18/24 1300       Education    Barriers to Learning No barriers identified  -RB    Education Provided Patient participated in establishing goals and treatment plan;Patient demonstrated recommended strategies;Patient requires further education on strategies, risks  -RB    Assessed Learning needs;Learning motivation;Learning preferences;Learning readiness  -RB    Learning Motivation Strong  -RB    Learning Method Explanation;Demonstration;Teach back;Written materials  -RB    Teaching Response Verbalized understanding;Demonstrated understanding;Reinforcement needed  -RB    Education Comments HEP: grouping, time pressure -RB              User Key  (r) = Recorded By, (t) = Taken By, (c) = Cosigned By      Initials Name Effective Dates    Alee Sheridan SLP 02/12/24 -                   Alee Wong MA CCC-SLP [place]  [place] license: [id number]      5/2/2024

## 2024-05-14 ENCOUNTER — TREATMENT (OUTPATIENT)
Dept: PHYSICAL THERAPY | Facility: CLINIC | Age: 59
End: 2024-05-14
Payer: COMMERCIAL

## 2024-05-14 DIAGNOSIS — R41.841 COGNITIVE COMMUNICATION DEFICIT: Primary | ICD-10-CM

## 2024-05-14 PROCEDURE — 92507 TX SP LANG VOICE COMM INDIV: CPT | Performed by: SPEECH-LANGUAGE PATHOLOGIST

## 2024-05-14 NOTE — PROGRESS NOTES
Outpatient Speech Language Pathology   Adult Speech Language Cognitive Progress Note  [address]       Patient Name: Naima Paul  : 1965  MRN: [id number]  Today's Date: 2024        Visit Date: 2024   Patient Active Problem List   Diagnosis    Cerebral venous sinus thrombosis, chronic    HTN (hypertension)    Syncope and collapse    Anxiety associated with depression    Leukocytosis    Personal history of COVID-19    Left arm weakness    Daily headache    COVID    Myopia    Presbyopia    Mixed anxiety and depressive disorder    Venous thrombosis    Obstructive sleep apnea syndrome    Hyperlipidemia    Hyperglycemia    Cognitive communication disorder    Other headache syndrome    Acquired thrombophilia        Past Medical History:   Diagnosis Date    Anemia     Anxiety     CTS (carpal tunnel syndrome) 1994    Depression     Diabetes     Headache, migraine     Hypertension     Obstructive sleep apnea syndrome 2022    Other headache syndrome 2023    Peripheral neuropathy     Shingles     Sleep apnea     Syncope and collapse 2022        Past Surgical History:   Procedure Laterality Date    ADENOIDECTOMY      CHOLECYSTECTOMY      COLONOSCOPY      DIAGNOSTIC LAPAROSCOPY      ENDOMETRIAL ABLATION      TONSILLECTOMY           Visit Dx:    ICD-10-CM ICD-9-CM   1. Cognitive communication deficit  R41.841 799.52            OP SLP Assessment/Plan          SLP Assessment    Functional Problems Speech Language- Adult/Cognition  -RB    Impact on Function: Adult Speech Language/Cognition Trouble learning or remembering new information;Poor attention to task;Decreased recall of personal information and medical history;Difficulty participating in avocational activities;Difficulty sequencing thoughts to express complex messages;Restrictions in personal and social life  -RB    Clinical Impression: Speech Language-Adult/Congnition Mild:;Cognitive Communication Impairment  -RB     Functional Problems Comment Impacts QOL and ADL's; deficits in word-finding and attention  -RB    Clinical Impression Comments Pt receptive to HEP and strategies;targeted metacognitive strategies, pt reports she is making improvements at home with use of strategies  -RB    Please refer to paper survey for additional self-reported information Yes  -RB    Please refer to items scanned into chart for additional diagnostic informaiton and handouts as provided by clinician Yes  -RB    SLP Diagnosis cog/comm disorder  -RB    Prognosis Good (comment)  -RB    Patient/caregiver participated in establishment of treatment plan and goals Yes  -RB    Patient would benefit from skilled therapy intervention Yes  -RB       SLP Plan    Frequency 1x weekly  -RB    Duration 5 weeks  -RB    Planned CPT's? SLP INDIVIDUAL SPEECH THERAPY: 53338  -RB    Expected Duration of Therapy Session (SLP Eval) 45  -RB    Plan Comments Continue POC  -RB              User Key  (r) = Recorded By, (t) = Taken By, (c) = Cosigned By      Initials Name Provider Type    Alee Sheridan SLP Speech and Language Pathologist                    Pain: 0     Subjective: Pt reports she is doing better        ACTIVITY  ACCURACY ASSISTANCE NEEDED   LTGs:     Pt will be able to remember information needed to function on home  tasks 90-95% of the time no cues     Pt and family will implement compensatory strategies to maximize patient’s attention abilities so patient can continue to participate in daily activities 90-95% of the time no cues       Targeted functional recall tasks               Modeled compensatory strategies     85-90%                  85-90%     No cues                   No cues   STG:    PT will utilize word finding strategies at 90-95% no cues in conversation        Targeted in conversation     85-90%     No cues    STG:     Pt will generate word finding strategy solutions for home tasks at 90-95% no cues        reviewed strategies for  word-finding   85-90% No cues   STG:    Pt will demonstrate improved ability to recall and summarize information by listening/reading information and  answering questions/ summarzing 90-95% of the time no cues         Targeted visual and auditory recall tasks     Article: 95%    Golf task: 100%    Podcast 10 mins: 95%:  Name recall: 4/4 5 min delay  To do: 4/4 5 min delay       No cues    STG:     Pt will improve executive functioning skills by using metacognitive strategies during functional tasks at 90% no cues       reviewed and targeted self-awareness, time pressure, modeled prioritizing      85%        Certification: 4/18/2024 -7/17/2024          OP SLP Education       Row Name 04/18/24 1300       Education    Barriers to Learning No barriers identified  -RB    Education Provided Patient participated in establishing goals and treatment plan;Patient demonstrated recommended strategies;Patient requires further education on strategies, risks  -RB    Assessed Learning needs;Learning motivation;Learning preferences;Learning readiness  -RB    Learning Motivation Strong  -RB    Learning Method Explanation;Demonstration;Teach back;Written materials  -RB    Teaching Response Verbalized understanding;Demonstrated understanding;Reinforcement needed  -RB    Education Comments HEP: visualization, prioritizing  -RB              User Key  (r) = Recorded By, (t) = Taken By, (c) = Cosigned By      Initials Name Effective Dates    RB Alee Wong SLP 02/12/24 -                   Alee Wong MA CCC-SLP [place]  [place] license: [id number]      5/14/2024

## 2024-05-21 ENCOUNTER — TELEPHONE (OUTPATIENT)
Dept: PHYSICAL THERAPY | Facility: CLINIC | Age: 59
End: 2024-05-21

## 2024-05-28 ENCOUNTER — TREATMENT (OUTPATIENT)
Dept: PHYSICAL THERAPY | Facility: CLINIC | Age: 59
End: 2024-05-28
Payer: COMMERCIAL

## 2024-05-28 DIAGNOSIS — R41.841 COGNITIVE COMMUNICATION DEFICIT: Primary | ICD-10-CM

## 2024-05-28 PROCEDURE — 92507 TX SP LANG VOICE COMM INDIV: CPT | Performed by: SPEECH-LANGUAGE PATHOLOGIST

## 2024-05-28 NOTE — PROGRESS NOTES
Outpatient Speech Language Pathology   Adult Speech Language Cognitive Treatment Note  [address]       Patient Name: Naima Paul  : 1965  MRN: [id number]  Today's Date: 2024        Visit Date: 2024   Patient Active Problem List   Diagnosis    Cerebral venous sinus thrombosis, chronic    HTN (hypertension)    Syncope and collapse    Anxiety associated with depression    Leukocytosis    Personal history of COVID-19    Left arm weakness    Daily headache    COVID    Myopia    Presbyopia    Mixed anxiety and depressive disorder    Venous thrombosis    Obstructive sleep apnea syndrome    Hyperlipidemia    Hyperglycemia    Cognitive communication disorder    Other headache syndrome    Acquired thrombophilia        Past Medical History:   Diagnosis Date    Anemia     Anxiety     CTS (carpal tunnel syndrome) 1994    Depression     Diabetes     Headache, migraine     Hypertension     Obstructive sleep apnea syndrome 2022    Other headache syndrome 2023    Peripheral neuropathy     Shingles     Sleep apnea     Syncope and collapse 2022        Past Surgical History:   Procedure Laterality Date    ADENOIDECTOMY      CHOLECYSTECTOMY      COLONOSCOPY      DIAGNOSTIC LAPAROSCOPY      ENDOMETRIAL ABLATION      TONSILLECTOMY           Visit Dx:    ICD-10-CM ICD-9-CM   1. Cognitive communication deficit  R41.841 799.52            OP SLP Assessment/Plan          SLP Assessment    Functional Problems Speech Language- Adult/Cognition  -RB    Impact on Function: Adult Speech Language/Cognition Trouble learning or remembering new information;Poor attention to task;Decreased recall of personal information and medical history;Difficulty participating in avocational activities;Difficulty sequencing thoughts to express complex messages;Restrictions in personal and social life  -RB    Clinical Impression: Speech Language-Adult/Congnition Mild:;Cognitive Communication Impairment  -RB     Functional Problems Comment Impacts QOL and ADL's; deficits in word-finding and attention  -RB    Clinical Impression Comments Pt receptive to HEP and strategies;targeted metacognitive strategies,educated on use of word finding strategies and advocating for herself in communication scenarios with communication partners   -RB    Please refer to paper survey for additional self-reported information Yes  -RB    Please refer to items scanned into chart for additional diagnostic informaiton and handouts as provided by clinician Yes  -RB    SLP Diagnosis cog/comm disorder  -RB    Prognosis Good (comment)  -RB    Patient/caregiver participated in establishment of treatment plan and goals Yes  -RB    Patient would benefit from skilled therapy intervention Yes  -RB       SLP Plan    Frequency 1x weekly  -RB    Duration 4 weeks  -RB    Planned CPT's? SLP INDIVIDUAL SPEECH THERAPY: 64942  -RB    Expected Duration of Therapy Session (SLP Haris) 45  -RB    Plan Comments Continue POC  -RB              User Key  (r) = Recorded By, (t) = Taken By, (c) = Cosigned By      Initials Name Provider Type    RB Alee Wong, SLP Speech and Language Pathologist                    Pain: 0     Subjective: Pt reports some word finding difficulty         ACTIVITY  ACCURACY ASSISTANCE NEEDED   LTGs:     Pt will be able to remember information needed to function on home  tasks 90-95% of the time no cues     Pt and family will implement compensatory strategies to maximize patient’s attention abilities so patient can continue to participate in daily activities 90-95% of the time no cues       Targeted functional recall tasks               Modeled compensatory strategies     90%                  90%     No cues                   No cues   STG:    PT will utilize word finding strategies at 90-95% no cues in conversation        Targeted in conversation     90%     No cues    STG:     Pt will generate word finding strategy solutions for home tasks at  90-95% no cues        reviewed strategies for word-finding in conversation, ways to advocate and take pressure off words   85-90% No cues   STG:    Pt will demonstrate improved ability to recall and summarize information by listening/reading information and  answering questions/ summarzing 90-95% of the time no cues         Targeted visual and auditory recall tasks     Article: 95%    Nail menu: 100%  Recipe: 100%  Name and state recall: 8/8 5 min delay         No-min cues    STG:     Pt will improve executive functioning skills by using metacognitive strategies during functional tasks at 90% no cues       reviewed and targeted self-awareness, time pressure, prioritizing, modeled SWAPS      85%   No cues     Certification: 4/18/2024 -7/17/2024          OP SLP Education               Education    Barriers to Learning No barriers identified  -RB    Education Provided Patient participated in establishing goals and treatment plan;Patient demonstrated recommended strategies;Patient requires further education on strategies, risks  -RB    Assessed Learning needs;Learning motivation;Learning preferences;Learning readiness  -RB    Learning Motivation Strong  -RB    Learning Method Explanation;Demonstration;Teach back;Written materials  -RB    Teaching Response Verbalized understanding;Demonstrated understanding;Reinforcement needed  -RB    Education Comments HEP: rehearsal, SWAPS  -RB              User Key  (r) = Recorded By, (t) = Taken By, (c) = Cosigned By      Initials Name Effective Dates    Alee Sheridan, ISREAL 02/12/24 -                   Alee Wong MA, CCC-SLP [facility] [place] license: [id number]      5/28/2024

## 2024-06-03 DIAGNOSIS — F90.2 ATTENTION DEFICIT HYPERACTIVITY DISORDER (ADHD), COMBINED TYPE, MILD: ICD-10-CM

## 2024-06-03 RX ORDER — METHYLPHENIDATE HYDROCHLORIDE 80 MG/1
80 CAPSULE ORAL NIGHTLY
Qty: 30 CAPSULE | Refills: 0 | Status: SHIPPED | OUTPATIENT
Start: 2024-06-03

## 2024-06-03 NOTE — TELEPHONE ENCOUNTER
Rx Refill Note  Requested Prescriptions     Pending Prescriptions Disp Refills    Methylphenidate HCl ER, PM, (Jornay PM) 80 MG capsule sustained-release 24 hr 30 capsule 0     Sig: Take 80 mg by mouth Every Night.      Last office visit with prescribing clinician: 4/29/2024   Last telemedicine visit with prescribing clinician: Visit date not found   Next office visit with prescribing clinician: 7/29/2024                         Would you like a call back once the refill request has been completed: [] Yes [] No    If the office needs to give you a call back, can they leave a voicemail: [] Yes [] No    Julia Perez MA  06/03/24, 10:48 EDT

## 2024-06-04 ENCOUNTER — TREATMENT (OUTPATIENT)
Dept: PHYSICAL THERAPY | Facility: CLINIC | Age: 59
End: 2024-06-04
Payer: COMMERCIAL

## 2024-06-04 DIAGNOSIS — R41.841 COGNITIVE COMMUNICATION DEFICIT: Primary | ICD-10-CM

## 2024-06-04 PROCEDURE — 92507 TX SP LANG VOICE COMM INDIV: CPT | Performed by: SPEECH-LANGUAGE PATHOLOGIST

## 2024-06-04 NOTE — PROGRESS NOTES
Outpatient Speech Language Pathology   Adult Speech Language Cognitive Treatment Note  [address]       Patient Name: Naima Paul  : 1965  MRN: [id number]  Today's Date: 2024        Visit Date: 2024   Patient Active Problem List   Diagnosis    Cerebral venous sinus thrombosis, chronic    HTN (hypertension)    Syncope and collapse    Anxiety associated with depression    Leukocytosis    Personal history of COVID-19    Left arm weakness    Daily headache    COVID    Myopia    Presbyopia    Mixed anxiety and depressive disorder    Venous thrombosis    Obstructive sleep apnea syndrome    Hyperlipidemia    Hyperglycemia    Cognitive communication disorder    Other headache syndrome    Acquired thrombophilia        Past Medical History:   Diagnosis Date    Anemia     Anxiety     CTS (carpal tunnel syndrome) 1994    Depression     Diabetes     Headache, migraine     Hypertension     Obstructive sleep apnea syndrome 2022    Other headache syndrome 2023    Peripheral neuropathy     Shingles     Sleep apnea     Syncope and collapse 2022        Past Surgical History:   Procedure Laterality Date    ADENOIDECTOMY      CHOLECYSTECTOMY      COLONOSCOPY      DIAGNOSTIC LAPAROSCOPY      ENDOMETRIAL ABLATION      TONSILLECTOMY           Visit Dx:    ICD-10-CM ICD-9-CM   1. Cognitive communication deficit  R41.841 799.52            OP SLP Assessment/Plan          SLP Assessment    Functional Problems Speech Language- Adult/Cognition  -RB    Impact on Function: Adult Speech Language/Cognition Trouble learning or remembering new information;Poor attention to task;Decreased recall of personal information and medical history;Difficulty participating in avocational activities;Difficulty sequencing thoughts to express complex messages;Restrictions in personal and social life  -RB    Clinical Impression: Speech Language-Adult/Congnition Mild:;Cognitive Communication Impairment  -RB     Functional Problems Comment Impacts QOL and ADL's; deficits in word-finding and attention  -RB    Clinical Impression Comments Pt receptive to HEP and strategies;pt reports using strategies and decreased anxiety lately, she is consistent with HEP and making gains with recall, attention and EF in sessions   -RB    Please refer to paper survey for additional self-reported information Yes  -RB    Please refer to items scanned into chart for additional diagnostic informaiton and handouts as provided by clinician Yes  -RB    SLP Diagnosis cog/comm disorder  -RB    Prognosis Good (comment)  -RB    Patient/caregiver participated in establishment of treatment plan and goals Yes  -RB    Patient would benefit from skilled therapy intervention Yes  -RB       SLP Plan    Frequency 1x weekly  -RB    Duration 3 weeks  -RB    Planned CPT's? SLP INDIVIDUAL SPEECH THERAPY: 95456  -RB    Expected Duration of Therapy Session (SLP Haris) 45  -RB    Plan Comments Continue POC  -RB              User Key  (r) = Recorded By, (t) = Taken By, (c) = Cosigned By      Initials Name Provider Type    Alee Sheridan, SLP Speech and Language Pathologist                    Pain: 0     Subjective: Pt reports she is using strategies        ACTIVITY  ACCURACY ASSISTANCE NEEDED   LTGs:     Pt will be able to remember information needed to function on home  tasks 90-95% of the time no cues     Pt and family will implement compensatory strategies to maximize patient’s attention abilities so patient can continue to participate in daily activities 90-95% of the time no cues       Targeted functional recall tasks               Modeled/targeted compensatory strategies     90%                  90%     No cues                   No cues   STG:    PT will utilize word finding strategies at 90-95% no cues in conversation        Targeted in conversation     90%     No cues    STG:     Pt will generate word finding strategy solutions for home tasks at 90-95% no  cues        reviewed strategies for word-finding in conversation, ways to advocate and take pressure off words, pt reports using strategies at home   90% No cues   STG:    Pt will demonstrate improved ability to recall and summarize information by listening/reading information and  answering questions/ summarzing 90-95% of the time no cues         Targeted visual and auditory recall tasks     Article: 95%    Grooming menu: 100%  6 min podcast: 90%  Recipe: 100%  Name recall: 4/4 5 min delay  Packing list: 5/5 5 min delay         No-min cues    STG:     Pt will improve executive functioning skills by using metacognitive strategies during functional tasks at 90% no cues       reviewed and targeted self-awareness, time pressure, prioritizing,  SWAPS, goal management       85%   No cues     Certification: 4/18/2024 -7/17/2024          OP SLP Education               Education    Barriers to Learning No barriers identified  -RB    Education Provided Patient participated in establishing goals and treatment plan;Patient demonstrated recommended strategies;Patient requires further education on strategies, risks  -RB    Assessed Learning needs;Learning motivation;Learning preferences;Learning readiness  -RB    Learning Motivation Strong  -RB    Learning Method Explanation;Demonstration;Teach back;Written materials  -RB    Teaching Response Verbalized understanding;Demonstrated understanding;Reinforcement needed  -RB    Education Comments HEP: elaboration, goal management   -RB              User Key  (r) = Recorded By, (t) = Taken By, (c) = Cosigned By      Initials Name Effective Dates    Alee Sheridan SLP 02/12/24 -                   Alee Wong MA, CCC-SLP [place]  [place] license: [id number]      6/4/2024

## 2024-06-11 ENCOUNTER — TREATMENT (OUTPATIENT)
Dept: PHYSICAL THERAPY | Facility: CLINIC | Age: 59
End: 2024-06-11
Payer: COMMERCIAL

## 2024-06-11 DIAGNOSIS — R41.841 COGNITIVE COMMUNICATION DEFICIT: Primary | ICD-10-CM

## 2024-06-11 NOTE — PROGRESS NOTES
Monitor: The patient is a 40 year old female presenting for an annual wellness visit.  Evaluation: Labs ordered today, refer to orders. PE was normal except for right temporal port wine macule   Assessment/Treatment:  Patient appears in good general health and in no acute distress.   Patient should obtain fasting wellness labs for general screening and chronic condition management, refer to orders.  Cancer Screening Recommendations Reviewed:   Advise on OTC Multi-vitamins QD   Increase intake of fruits and vegetables to at least 2 servings each a day.  Preventative diet & exercise related life-style changes discussed & recommended; I recommended the patient begin a regular exercise regimen of at least 30 minutes to 1 hour of exercise 2-3x a week.  Discussed water intake, dental care, depression screen, screening labs, family history and exam findings.  Discussed and recommended vaccines that may be needed.  Weight goals reviewed & discussed.    Recommended lifestyle education and preventative guideline literature given at the end of the visit.       Outpatient Speech Language Pathology   Adult Speech Language Cognitive Treatment Note  [address]       Patient Name: Naima Paul  : 1965  MRN: [id number]  Today's Date: 2024        Visit Date: 2024   Patient Active Problem List   Diagnosis    Cerebral venous sinus thrombosis, chronic    HTN (hypertension)    Syncope and collapse    Anxiety associated with depression    Leukocytosis    Personal history of COVID-19    Left arm weakness    Daily headache    COVID    Myopia    Presbyopia    Mixed anxiety and depressive disorder    Venous thrombosis    Obstructive sleep apnea syndrome    Hyperlipidemia    Hyperglycemia    Cognitive communication disorder    Other headache syndrome    Acquired thrombophilia        Past Medical History:   Diagnosis Date    Anemia     Anxiety     CTS (carpal tunnel syndrome) 1994    Depression     Diabetes     Headache, migraine     Hypertension     Obstructive sleep apnea syndrome 2022    Other headache syndrome 2023    Peripheral neuropathy     Shingles     Sleep apnea     Syncope and collapse 2022        Past Surgical History:   Procedure Laterality Date    ADENOIDECTOMY      CHOLECYSTECTOMY      COLONOSCOPY      DIAGNOSTIC LAPAROSCOPY      ENDOMETRIAL ABLATION      TONSILLECTOMY           Visit Dx:    ICD-10-CM ICD-9-CM   1. Cognitive communication deficit  R41.841 799.52            OP SLP Assessment/Plan          SLP Assessment    Functional Problems Speech Language- Adult/Cognition  -RB    Impact on Function: Adult Speech Language/Cognition Trouble learning or remembering new information;Poor attention to task;Decreased recall of personal information and medical history;Difficulty participating in avocational activities;Difficulty sequencing thoughts to express complex messages;Restrictions in personal and social life  -RB    Clinical Impression: Speech Language-Adult/Congnition Mild:;Cognitive Communication Impairment  -RB     Functional Problems Comment Impacts QOL and ADL's; deficits in word-finding and attention  -RB    Clinical Impression Comments Pt receptive to HEP and strategies; pt reports another good week and no new concerns, pt more accurate across past few sessions across all tasks, consistent with HEP  -RB    Please refer to paper survey for additional self-reported information Yes  -RB    Please refer to items scanned into chart for additional diagnostic informaiton and handouts as provided by clinician Yes  -RB    SLP Diagnosis cog/comm disorder  -RB    Prognosis Good (comment)  -RB    Patient/caregiver participated in establishment of treatment plan and goals Yes  -RB    Patient would benefit from skilled therapy intervention Yes  -RB       SLP Plan    Frequency 1x weekly  -RB    Duration 2 weeks  -RB    Planned CPT's? SLP INDIVIDUAL SPEECH THERAPY: 81018  -RB    Expected Duration of Therapy Session (SLP Haris) 45  -RB    Plan Comments Continue POC  -RB              User Key  (r) = Recorded By, (t) = Taken By, (c) = Cosigned By      Initials Name Provider Type    RB Alee Wong, SLP Speech and Language Pathologist                    Pain: 0     Subjective: Pt reports she has had a good week         ACTIVITY  ACCURACY ASSISTANCE NEEDED   LTGs:     Pt will be able to remember information needed to function on home  tasks 90-95% of the time no cues     Pt and family will implement compensatory strategies to maximize patient’s attention abilities so patient can continue to participate in daily activities 90-95% of the time no cues       Targeted functional recall tasks               Modeled/targeted compensatory strategies     90%                  90%     No cues                   No cues   STG:    PT will utilize word finding strategies at 90-95% no cues in conversation        Targeted in conversation, modeled RET     90-95%     No cues    STG:     Pt will generate word finding strategy solutions for home tasks at 90-95% no  cues        reviewed strategies for word-finding in conversation, ways to advocate and take pressure off words   90-95% No cues   STG:    Pt will demonstrate improved ability to recall and summarize information by listening/reading information and  answering questions/ summarzing 90-95% of the time no cues         Targeted visual and auditory recall tasks- MET x1     Article: 95%    [place] planning task: 100%  Recipe: 100%  Shopping ad: 100%  Home invoice: 100%  Name recall: 4/4 5 min delay, 15 min delay  To do list: 5/5 5 min delay         No-min cues    STG:     Pt will improve executive functioning skills by using metacognitive strategies during functional tasks at 90% no cues       reviewed and targeted self-awareness, time pressure, prioritizing,  SWAPS, goal management, pacing       85-90%   No cues     Certification: 4/18/2024 -7/17/2024          OP SLP Education               Education    Barriers to Learning No barriers identified  -RB    Education Provided Patient participated in establishing goals and treatment plan;Patient demonstrated recommended strategies;Patient requires further education on strategies, risks  -RB    Assessed Learning needs;Learning motivation;Learning preferences;Learning readiness  -RB    Learning Motivation Strong  -RB    Learning Method Explanation;Demonstration;Teach back;Written materials  -RB    Teaching Response Verbalized understanding;Demonstrated understanding;Reinforcement needed  -RB    Education Comments HEP: pacing, RET  -RB              User Key  (r) = Recorded By, (t) = Taken By, (c) = Cosigned By      Initials Name Effective Dates    Alee Sheridan SLP 02/12/24 -                   Alee Wong MA, CCC-SLP [place]  [place] license: [id number]      6/11/2024

## 2024-07-02 DIAGNOSIS — F90.2 ATTENTION DEFICIT HYPERACTIVITY DISORDER (ADHD), COMBINED TYPE, MILD: ICD-10-CM

## 2024-07-02 RX ORDER — METHYLPHENIDATE HYDROCHLORIDE 80 MG/1
80 CAPSULE ORAL NIGHTLY
Qty: 30 CAPSULE | Refills: 0 | Status: SHIPPED | OUTPATIENT
Start: 2024-07-02

## 2024-07-02 NOTE — TELEPHONE ENCOUNTER
Rx Refill Note  Requested Prescriptions     Pending Prescriptions Disp Refills    Methylphenidate HCl ER, PM, (Jornay PM) 80 MG capsule sustained-release 24 hr 30 capsule 0     Sig: Take 80 mg by mouth Every Night.      Last office visit with prescribing clinician: 4/29/2024   Last telemedicine visit with prescribing clinician: Visit date not found   Next office visit with prescribing clinician: 7/29/2024                         Would you like a call back once the refill request has been completed: [] Yes [] No    If the office needs to give you a call back, can they leave a voicemail: [] Yes [] No    Julia Perez MA  07/02/24, 09:31 EDT

## 2024-07-09 ENCOUNTER — TREATMENT (OUTPATIENT)
Dept: PHYSICAL THERAPY | Facility: CLINIC | Age: 59
End: 2024-07-09
Payer: COMMERCIAL

## 2024-07-09 DIAGNOSIS — R41.841 COGNITIVE COMMUNICATION DEFICIT: Primary | ICD-10-CM

## 2024-07-09 NOTE — PROGRESS NOTES
Outpatient Speech Language Pathology   Adult Speech Language Cognitive Progress Note  [address]       Patient Name: Naima Paul  : 1965  MRN: [id number]  Today's Date: 2024        Visit Date: 2024   Patient Active Problem List   Diagnosis    Cerebral venous sinus thrombosis, chronic    HTN (hypertension)    Syncope and collapse    Anxiety associated with depression    Leukocytosis    Personal history of COVID-19    Left arm weakness    Daily headache    COVID    Myopia    Presbyopia    Mixed anxiety and depressive disorder    Venous thrombosis    Obstructive sleep apnea syndrome    Hyperlipidemia    Hyperglycemia    Cognitive communication disorder    Other headache syndrome    Acquired thrombophilia        Past Medical History:   Diagnosis Date    Anemia     Anxiety     CTS (carpal tunnel syndrome) 1994    Depression     Diabetes     Headache, migraine     Hypertension     Obstructive sleep apnea syndrome 2022    Other headache syndrome 2023    Peripheral neuropathy     Shingles     Sleep apnea     Syncope and collapse 2022        Past Surgical History:   Procedure Laterality Date    ADENOIDECTOMY      CHOLECYSTECTOMY      COLONOSCOPY      DIAGNOSTIC LAPAROSCOPY      ENDOMETRIAL ABLATION      TONSILLECTOMY           Visit Dx:    ICD-10-CM ICD-9-CM   1. Cognitive communication deficit  R41.841 799.52            OP SLP Assessment/Plan          SLP Assessment    Functional Problems Speech Language- Adult/Cognition  -RB    Impact on Function: Adult Speech Language/Cognition Trouble learning or remembering new information;Poor attention to task;Decreased recall of personal information and medical history;Difficulty participating in avocational activities;Difficulty sequencing thoughts to express complex messages;Restrictions in personal and social life  -RB    Clinical Impression: Speech Language-Adult/Congnition Mild:;Cognitive Communication Impairment  -RB     Functional Problems Comment Impacts QOL and ADL's; deficits in word-finding and attention  -RB    Clinical Impression Comments Pt receptive to HEP and strategies;pt using word finding and thought organization strategies, discussed executive function strategies for initiation and completion of tasks, educated on timers and priority lists   -RB    Please refer to paper survey for additional self-reported information Yes  -RB    Please refer to items scanned into chart for additional diagnostic informaiton and handouts as provided by clinician Yes  -RB    SLP Diagnosis cog/comm disorder  -RB    Prognosis Good (comment)  -RB    Patient/caregiver participated in establishment of treatment plan and goals Yes  -RB    Patient would benefit from skilled therapy intervention Yes  -RB       SLP Plan    Frequency 1x weekly  -RB    Duration 1 weeks  -RB    Planned CPT's? SLP INDIVIDUAL SPEECH THERAPY: 87766  -RB    Expected Duration of Therapy Session (SLP Haris) 45  -RB    Plan Comments Continue POC  -RB              User Key  (r) = Recorded By, (t) = Taken By, (c) = Cosigned By      Initials Name Provider Type    Alee Sheridan, SLP Speech and Language Pathologist                    Pain: 0     Subjective: Pt reports trouble word finding, but being less stressed by it         ACTIVITY  ACCURACY ASSISTANCE NEEDED   LTGs:     Pt will be able to remember information needed to function on home  tasks 90-95% of the time no cues     Pt and family will implement compensatory strategies to maximize patient’s attention abilities so patient can continue to participate in daily activities 90-95% of the time no cues       Targeted functional recall tasks               Modeled/targeted compensatory strategies     90%                  90%     No cues                   No cues   STG:    PT will utilize word finding strategies at 90-95% no cues in conversation        Targeted in conversation,  RET     90-95%     No cues    STG:     Pt will  generate word finding strategy solutions for home tasks at 90-95% no cues        reviewed strategies for word-finding in conversation, ways to advocate and take pressure off words, pt reports working on this at home   90-95% No cues   STG:    Pt will demonstrate improved ability to recall and summarize information by listening/reading information and  answering questions/ summarzing 90-95% of the time no cues         Targeted visual and auditory recall tasks- MET      Article: 100%      menu: 100%    Name recall: 4/4 5 min delay  To do list: 5/5 5 min delay         No cues    STG:     Pt will improve executive functioning skills by using metacognitive strategies during functional tasks at 90% no cues       reviewed and targeted self-awareness, time pressure, prioritizing,  SWAPS, goal management, pacing, time estimation       90%   No cues     Certification: 4/18/2024 -7/17/2024          OP SLP Education               Education    Barriers to Learning No barriers identified  -RB    Education Provided Patient participated in establishing goals and treatment plan;Patient demonstrated recommended strategies;Patient requires further education on strategies, risks  -RB    Assessed Learning needs;Learning motivation;Learning preferences;Learning readiness  -RB    Learning Motivation Strong  -RB    Learning Method Explanation;Demonstration;Teach back;Written materials  -RB    Teaching Response Verbalized understanding;Demonstrated understanding;Reinforcement needed  -RB    Education Comments HEP: time estimation  -RB              User Key  (r) = Recorded By, (t) = Taken By, (c) = Cosigned By      Initials Name Effective Dates    Alee Sheridan SLP 02/12/24 -                   Alee Wong MA, CCC-SLP [facility] license: [id number]      7/9/2024

## 2024-07-26 ENCOUNTER — TREATMENT (OUTPATIENT)
Dept: PHYSICAL THERAPY | Facility: CLINIC | Age: 59
End: 2024-07-26
Payer: COMMERCIAL

## 2024-07-26 DIAGNOSIS — R41.841 COGNITIVE COMMUNICATION DEFICIT: Primary | ICD-10-CM

## 2024-07-26 NOTE — PROGRESS NOTES
Outpatient Speech Language Pathology   Adult Speech Language Cognitive Discharge Note  [address]       Patient Name: Naima Paul  : 1965  MRN: [id number]  Today's Date: 2024        Visit Date: 2024   Patient Active Problem List   Diagnosis    Cerebral venous sinus thrombosis, chronic    HTN (hypertension)    Syncope and collapse    Anxiety associated with depression    Leukocytosis    Personal history of COVID-19    Left arm weakness    Daily headache    COVID    Myopia    Presbyopia    Mixed anxiety and depressive disorder    Venous thrombosis    Obstructive sleep apnea syndrome    Hyperlipidemia    Hyperglycemia    Cognitive communication disorder    Other headache syndrome    Acquired thrombophilia        Past Medical History:   Diagnosis Date    Anemia     Anxiety     CTS (carpal tunnel syndrome) 1994    Depression     Diabetes     Headache, migraine     Hypertension     Obstructive sleep apnea syndrome 2022    Other headache syndrome 2023    Peripheral neuropathy     Shingles     Sleep apnea     Syncope and collapse 2022        Past Surgical History:   Procedure Laterality Date    ADENOIDECTOMY      CHOLECYSTECTOMY      COLONOSCOPY      DIAGNOSTIC LAPAROSCOPY      ENDOMETRIAL ABLATION      TONSILLECTOMY           Visit Dx:    ICD-10-CM ICD-9-CM   1. Cognitive communication deficit  R41.841 799.52            OP SLP Assessment/Plan          SLP Assessment                        Clinical Impression Comments Pt receptive to HEP and strategies; pt more independent with HEP and use of strategies at home. Pt met all goals and reports improvement. Pt reports less frustration overall. Discharge and return as needed  -RB                                     SLP Plan                        Plan Comments discharge  -RB              User Key  (r) = Recorded By, (t) = Taken By, (c) = Cosigned By      Initials Name Provider Type    Alee Sheridan, SLP Speech and  Language Pathologist                    Pain: 0     Subjective: Pt reports doing better overall, less frustrated         ACTIVITY  ACCURACY ASSISTANCE NEEDED   LTGs:     Pt will be able to remember information needed to function on home  tasks 90-95% of the time no cues     Pt and family will implement compensatory strategies to maximize patient’s attention abilities so patient can continue to participate in daily activities 90-95% of the time no cues       Targeted functional recall tasks   MET            Modeled/targeted compensatory strategies MET     90-95%                  90-95%     No cues                   No cues   STG:    PT will utilize word finding strategies at 90-95% no cues in conversation        Targeted in conversation,  RET- MET     90-95%     No cues    STG:     Pt will generate word finding strategy solutions for home tasks at 90-95% no cues        reviewed strategies for word-finding in conversation, ways to advocate and take pressure off words,-MET   90-95% No cues   STG:    Pt will demonstrate improved ability to recall and summarize information by listening/reading information and  answering questions/ summarzing 90-95% of the time no cues        MET      Article: 100% x2    Recipe: 100%  7 min podcast: 100%    Name recall: 5/5 5 min delay  Grocery list: 3/5 5 min delay, 5/5 10  min delay         No cues    STG:     Pt will improve executive functioning skills by using metacognitive strategies during functional tasks at 90% no cues       reviewed and targeted self-awareness, time pressure, prioritizing,  SWAPS, goal management, pacing, time estimation- MET       90%   No cues     Certification: 4/18/2024 -7/17/2024          OP SLP Education               Education    Barriers to Learning No barriers identified  -RB    Education Provided Patient participated in establishing goals and treatment plan;Patient demonstrated recommended strategies;Patient requires further education on strategies,  risks  -RB    Assessed Learning needs;Learning motivation;Learning preferences;Learning readiness  -RB    Learning Motivation Strong  -RB    Learning Method Explanation;Demonstration;Teach back;Written materials  -RB    Teaching Response Verbalized understanding;Demonstrated understanding;Reinforcement needed  -RB    Education Comments HEP: IM-lists, RET practice  -RB              User Key  (r) = Recorded By, (t) = Taken By, (c) = Cosigned By      Initials Name Effective Dates    Alee Sheridan SLP 02/12/24 -                   Alee Wong MA, CCC-ISREAL [facility] license: [id number]      7/26/2024

## 2024-08-01 DIAGNOSIS — F41.1 GENERALIZED ANXIETY DISORDER: ICD-10-CM

## 2024-08-01 DIAGNOSIS — F90.2 ATTENTION DEFICIT HYPERACTIVITY DISORDER (ADHD), COMBINED TYPE, MILD: ICD-10-CM

## 2024-08-01 DIAGNOSIS — F33.1 MAJOR DEPRESSIVE DISORDER, RECURRENT EPISODE, MODERATE: ICD-10-CM

## 2024-08-01 RX ORDER — ARIPIPRAZOLE 2 MG/1
2 TABLET ORAL NIGHTLY
Qty: 90 TABLET | Refills: 0 | Status: SHIPPED | OUTPATIENT
Start: 2024-08-01

## 2024-08-01 RX ORDER — METHYLPHENIDATE HYDROCHLORIDE 80 MG/1
80 CAPSULE ORAL NIGHTLY
Qty: 30 CAPSULE | Refills: 0 | Status: SHIPPED | OUTPATIENT
Start: 2024-08-01

## 2024-08-01 NOTE — TELEPHONE ENCOUNTER
Rx Refill Note  Requested Prescriptions     Pending Prescriptions Disp Refills    ARIPiprazole (Abilify) 2 MG tablet 90 tablet 0     Sig: Take 1 tablet by mouth Every Night.    Methylphenidate HCl ER, PM, (Jornay PM) 80 MG capsule sustained-release 24 hr 30 capsule 0     Sig: Take 80 mg by mouth Every Night.      Last office visit with prescribing clinician: 4/29/2024   Last telemedicine visit with prescribing clinician: Visit date not found   Next office visit with prescribing clinician: 9/17/2024                         Would you like a call back once the refill request has been completed: [] Yes [] No    If the office needs to give you a call back, can they leave a voicemail: [] Yes [] No    Julai Perez MA  08/01/24, 09:42 EDT

## 2024-08-06 DIAGNOSIS — F41.1 GENERALIZED ANXIETY DISORDER: ICD-10-CM

## 2024-08-06 DIAGNOSIS — F33.1 MAJOR DEPRESSIVE DISORDER, RECURRENT EPISODE, MODERATE: ICD-10-CM

## 2024-08-31 DIAGNOSIS — F41.1 GENERALIZED ANXIETY DISORDER: ICD-10-CM

## 2024-08-31 DIAGNOSIS — F33.1 MAJOR DEPRESSIVE DISORDER, RECURRENT EPISODE, MODERATE: ICD-10-CM

## 2024-09-01 RX ORDER — BUPROPION HYDROCHLORIDE 300 MG/1
300 TABLET ORAL EVERY MORNING
Qty: 90 TABLET | Refills: 0 | Status: SHIPPED | OUTPATIENT
Start: 2024-09-01

## 2024-09-03 DIAGNOSIS — F90.2 ATTENTION DEFICIT HYPERACTIVITY DISORDER (ADHD), COMBINED TYPE, MILD: ICD-10-CM

## 2024-09-03 RX ORDER — METHYLPHENIDATE HYDROCHLORIDE 80 MG/1
80 CAPSULE ORAL NIGHTLY
Qty: 30 CAPSULE | Refills: 0 | Status: SHIPPED | OUTPATIENT
Start: 2024-09-03

## 2024-09-03 NOTE — TELEPHONE ENCOUNTER
Rx Refill Note  Requested Prescriptions     Pending Prescriptions Disp Refills    Methylphenidate HCl ER, PM, (Jornay PM) 80 MG capsule sustained-release 24 hr 30 capsule 0     Sig: Take 80 mg by mouth Every Night.      Last office visit with prescribing clinician: 4/29/2024   Last telemedicine visit with prescribing clinician: Visit date not found   Next office visit with prescribing clinician: 9/17/2024                         Would you like a call back once the refill request has been completed: [] Yes [] No    If the office needs to give you a call back, can they leave a voicemail: [] Yes [] No    Julia Perez MA  09/03/24, 11:09 EDT

## 2024-09-17 ENCOUNTER — OFFICE VISIT (OUTPATIENT)
Dept: BEHAVIORAL HEALTH | Facility: CLINIC | Age: 59
End: 2024-09-17
Payer: COMMERCIAL

## 2024-09-17 VITALS
HEART RATE: 70 BPM | BODY MASS INDEX: 37.74 KG/M2 | SYSTOLIC BLOOD PRESSURE: 124 MMHG | OXYGEN SATURATION: 99 % | DIASTOLIC BLOOD PRESSURE: 82 MMHG | WEIGHT: 213 LBS | HEIGHT: 63 IN

## 2024-09-17 DIAGNOSIS — F41.1 GENERALIZED ANXIETY DISORDER: ICD-10-CM

## 2024-09-17 DIAGNOSIS — F51.04 PSYCHOPHYSIOLOGICAL INSOMNIA: ICD-10-CM

## 2024-09-17 DIAGNOSIS — F33.1 MAJOR DEPRESSIVE DISORDER, RECURRENT EPISODE, MODERATE: ICD-10-CM

## 2024-09-17 DIAGNOSIS — F90.2 ATTENTION DEFICIT HYPERACTIVITY DISORDER (ADHD), COMBINED TYPE, MILD: Primary | ICD-10-CM

## 2024-09-17 PROCEDURE — 99214 OFFICE O/P EST MOD 30 MIN: CPT

## 2024-09-17 RX ORDER — ESCITALOPRAM OXALATE 20 MG/1
20 TABLET ORAL DAILY
Qty: 90 TABLET | Refills: 0 | Status: SHIPPED | OUTPATIENT
Start: 2024-09-17

## 2024-09-17 RX ORDER — BUSPIRONE HYDROCHLORIDE 15 MG/1
15 TABLET ORAL 2 TIMES DAILY
Qty: 180 TABLET | Refills: 0 | Status: SHIPPED | OUTPATIENT
Start: 2024-09-17

## 2024-09-17 RX ORDER — BUPROPION HYDROCHLORIDE 300 MG/1
300 TABLET ORAL EVERY MORNING
Qty: 90 TABLET | Refills: 0 | Status: SHIPPED | OUTPATIENT
Start: 2024-09-17

## 2024-09-17 RX ORDER — ARIPIPRAZOLE 2 MG/1
2 TABLET ORAL NIGHTLY
Qty: 90 TABLET | Refills: 0 | Status: SHIPPED | OUTPATIENT
Start: 2024-09-17

## 2024-09-17 RX ORDER — DEXTROAMPHETAMINE SACCHARATE, AMPHETAMINE ASPARTATE, DEXTROAMPHETAMINE SULFATE AND AMPHETAMINE SULFATE 5; 5; 5; 5 MG/1; MG/1; MG/1; MG/1
20 TABLET ORAL DAILY
Qty: 30 TABLET | Refills: 0 | Status: SHIPPED | OUTPATIENT
Start: 2024-09-17

## 2024-09-17 RX ORDER — ARIPIPRAZOLE 2 MG/1
2 TABLET ORAL NIGHTLY
Qty: 90 TABLET | Refills: 0 | OUTPATIENT
Start: 2024-09-17

## 2024-10-01 DIAGNOSIS — F90.2 ATTENTION DEFICIT HYPERACTIVITY DISORDER (ADHD), COMBINED TYPE, MILD: ICD-10-CM

## 2024-10-01 RX ORDER — METHYLPHENIDATE HYDROCHLORIDE 80 MG/1
80 CAPSULE ORAL NIGHTLY
Qty: 30 CAPSULE | Refills: 0 | Status: SHIPPED | OUTPATIENT
Start: 2024-10-01

## 2024-10-01 NOTE — TELEPHONE ENCOUNTER
Rx Refill Note  Requested Prescriptions     Pending Prescriptions Disp Refills    Methylphenidate HCl ER, PM, (Jornay PM) 80 MG capsule sustained-release 24 hr 30 capsule 0     Sig: Take 80 mg by mouth Every Night.      Last office visit with prescribing clinician: 9/17/2024   Last telemedicine visit with prescribing clinician: Visit date not found   Next office visit with prescribing clinician: 12/17/2024                         Would you like a call back once the refill request has been completed: [] Yes [] No    If the office needs to give you a call back, can they leave a voicemail: [] Yes [] No    Julia Perez MA  10/01/24, 10:48 EDT

## 2024-10-28 DIAGNOSIS — F41.1 GENERALIZED ANXIETY DISORDER: ICD-10-CM

## 2024-10-28 DIAGNOSIS — F90.2 ATTENTION DEFICIT HYPERACTIVITY DISORDER (ADHD), COMBINED TYPE, MILD: ICD-10-CM

## 2024-10-28 DIAGNOSIS — F33.1 MAJOR DEPRESSIVE DISORDER, RECURRENT EPISODE, MODERATE: ICD-10-CM

## 2024-10-28 RX ORDER — ARIPIPRAZOLE 2 MG/1
2 TABLET ORAL NIGHTLY
Qty: 90 TABLET | Refills: 0 | Status: SHIPPED | OUTPATIENT
Start: 2024-10-28

## 2024-10-28 RX ORDER — METHYLPHENIDATE HYDROCHLORIDE 80 MG/1
80 CAPSULE ORAL NIGHTLY
Qty: 30 CAPSULE | Refills: 0 | Status: SHIPPED | OUTPATIENT
Start: 2024-10-28

## 2024-10-28 NOTE — TELEPHONE ENCOUNTER
Rx Refill Note  Requested Prescriptions     Pending Prescriptions Disp Refills    ARIPiprazole (Abilify) 2 MG tablet 90 tablet 0     Sig: Take 1 tablet by mouth Every Night.    Methylphenidate HCl ER, PM, (Jornay PM) 80 MG capsule sustained-release 24 hr 30 capsule 0     Sig: Take 80 mg by mouth Every Night.      Last office visit with prescribing clinician: 9/17/2024   Last telemedicine visit with prescribing clinician: Visit date not found   Next office visit with prescribing clinician: 12/17/2024                         Would you like a call back once the refill request has been completed: [] Yes [] No    If the office needs to give you a call back, can they leave a voicemail: [] Yes [] No    Julia Perez MA  10/28/24, 10:32 EDT

## 2024-11-25 DIAGNOSIS — F90.2 ATTENTION DEFICIT HYPERACTIVITY DISORDER (ADHD), COMBINED TYPE, MILD: ICD-10-CM

## 2024-11-25 RX ORDER — METHYLPHENIDATE HYDROCHLORIDE 80 MG/1
80 CAPSULE ORAL NIGHTLY
Qty: 30 CAPSULE | Refills: 0 | Status: SHIPPED | OUTPATIENT
Start: 2024-11-25

## 2024-12-17 ENCOUNTER — OFFICE VISIT (OUTPATIENT)
Dept: BEHAVIORAL HEALTH | Facility: CLINIC | Age: 59
End: 2024-12-17
Payer: COMMERCIAL

## 2024-12-17 VITALS
HEART RATE: 79 BPM | OXYGEN SATURATION: 94 % | DIASTOLIC BLOOD PRESSURE: 90 MMHG | BODY MASS INDEX: 36.86 KG/M2 | WEIGHT: 208 LBS | SYSTOLIC BLOOD PRESSURE: 132 MMHG | HEIGHT: 63 IN

## 2024-12-17 DIAGNOSIS — F33.1 MAJOR DEPRESSIVE DISORDER, RECURRENT EPISODE, MODERATE: ICD-10-CM

## 2024-12-17 DIAGNOSIS — F90.2 ATTENTION DEFICIT HYPERACTIVITY DISORDER (ADHD), COMBINED TYPE, MILD: Primary | ICD-10-CM

## 2024-12-17 DIAGNOSIS — F51.04 PSYCHOPHYSIOLOGICAL INSOMNIA: ICD-10-CM

## 2024-12-17 DIAGNOSIS — F41.1 GENERALIZED ANXIETY DISORDER: ICD-10-CM

## 2024-12-17 PROCEDURE — 99214 OFFICE O/P EST MOD 30 MIN: CPT

## 2024-12-17 RX ORDER — ARIPIPRAZOLE 2 MG/1
2 TABLET ORAL NIGHTLY
Qty: 90 TABLET | Refills: 0 | Status: SHIPPED | OUTPATIENT
Start: 2024-12-17

## 2024-12-17 RX ORDER — BUPROPION HYDROCHLORIDE 300 MG/1
300 TABLET ORAL EVERY MORNING
Qty: 90 TABLET | Refills: 0 | Status: SHIPPED | OUTPATIENT
Start: 2024-12-17

## 2024-12-17 RX ORDER — BUSPIRONE HYDROCHLORIDE 15 MG/1
15 TABLET ORAL 2 TIMES DAILY
Qty: 180 TABLET | Refills: 0 | Status: SHIPPED | OUTPATIENT
Start: 2024-12-17

## 2024-12-17 RX ORDER — ESCITALOPRAM OXALATE 20 MG/1
20 TABLET ORAL DAILY
Qty: 90 TABLET | Refills: 0 | Status: SHIPPED | OUTPATIENT
Start: 2024-12-17

## 2024-12-17 RX ORDER — DICYCLOMINE HCL 20 MG
1 TABLET ORAL 3 TIMES DAILY
COMMUNITY
Start: 2024-11-13

## 2024-12-17 RX ORDER — METHYLPHENIDATE HYDROCHLORIDE 80 MG/1
80 CAPSULE ORAL NIGHTLY
Qty: 30 CAPSULE | Refills: 0 | Status: SHIPPED | OUTPATIENT
Start: 2024-12-17

## 2024-12-17 NOTE — PROGRESS NOTES
Follow Up Office Visit    Patient Name: Naima Paul  : 1965   MRN: [id number]   Care Team: Patient Care Team:  Bela Estrella DO as PCP - General (Family Medicine)  Carol Rao APRN as Nurse Practitioner (Behavioral Health)         Chief Complaint:    Chief Complaint   Patient presents with    ADHD    Anxiety    Depression    Med Management    Sleeping Problem       History of Present Illness: Naima Paul is a 59 y.o. female who is here today for a medication management follow up. Patient reports that overall medication continues to be effective. She feels that her focus and concentration are doing good and her mood and anxiety have been managed well. She did not see the need to make any changes and did not have any medication concerns at today's visit. She denies SI/HI today.     The following portion of the patient's history were reviewed and updated appropriately: allergies, current and past medications, family history, medical history and social history. MIRYAM reviewed and appropriate.   Subjective   Review of Systems:    Review of Systems   Cardiovascular: Negative.  Negative for chest pain and palpitations.   Neurological:  Negative for dizziness and confusion.   Psychiatric/Behavioral: Negative.     All other systems reviewed and are negative.      Current Medications:   Current Outpatient Medications   Medication Sig Dispense Refill    amphetamine-dextroamphetamine (Adderall) 20 MG tablet Take 1 tablet by mouth Daily. 30 tablet 0    apixaban (Eliquis) 5 MG tablet tablet Take 1 tablet by mouth Every 12 (Twelve) Hours. 180 tablet 3    ARIPiprazole (Abilify) 2 MG tablet Take 1 tablet by mouth Every Night. 90 tablet 0    buPROPion XL (WELLBUTRIN XL) 300 MG 24 hr tablet Take 1 tablet by mouth Every Morning. 90 tablet 0    busPIRone (BUSPAR) 15 MG tablet Take 1 tablet by mouth 2 (Two) Times a Day. 180 tablet 0    dicyclomine (BENTYL) 20 MG tablet Take 1 tablet by mouth 3 times a day.       "escitalopram (LEXAPRO) 20 MG tablet Take 1 tablet by mouth Daily. 90 tablet 0    loratadine (CLARITIN) 10 MG tablet Take 1 tablet by mouth Daily.      Methylphenidate HCl ER, PM, (Jornay PM) 80 MG capsule sustained-release 24 hr Take 80 mg by mouth Every Night. 30 capsule 0    metoprolol succinate XL (TOPROL-XL) 50 MG 24 hr tablet metoprolol succinate ER 50 mg tablet,extended release 24 hr   TAKE 1 TABLET BY MOUTH ONCE DAILY \"PT MUST BE SEEN FOR ADDITIONAL REFILS\"      montelukast (SINGULAIR) 10 MG tablet Take 1 tablet by mouth Every Night.       No current facility-administered medications for this visit.       Mental Status Exam:   Hygiene:   good  Cooperation:  Cooperative  Eye Contact:  Good  Psychomotor Behavior:  Appropriate  Affect:  Appropriate  Mood: normal  Speech:  Normal  Thought Process:  Goal directed and Linear  Thought Content:  Normal and Mood congruent  Suicidal:  None  Homicidal:  None  Hallucinations:  None  Delusion:  None  Memory:  Intact  Orientation:  Person, Place, Time, and Situation  Reliability:  good  Insight:  Good  Judgement:  Good  Impulse Control:  Good  Physical/Medical Issues:  Yes see chart       Objective   Vital Signs:   /90   Pulse 79   Ht 160 cm (63\")   Wt 94.3 kg (208 lb)   SpO2 94%   BMI 36.85 kg/m²         Lab Results:   No visits with results within 3 Month(s) from this visit.   Latest known visit with results is:   Office Visit on 01/23/2024   Component Date Value Ref Range Status    Report Summary 03/06/2024 FINAL   Final    Comment: ====================================================================  [facility] COMP DRUG ANALYSIS,[facility]  ====================================================================  Test                             Result       Flag       Units  Drug Present and Declared for Prescription Verification    Ritalinic Acid                 PRESENT      EXPECTED     Ritalinic acid is an expected metabolite of methylphenidate.     Source of " methylphenidate is a scheduled prescription medication.    Bupropion                      PRESENT      EXPECTED    Hydroxybupropion               PRESENT      EXPECTED     Hydroxybupropion is an expected metabolite of bupropion.    Citalopram                     PRESENT      EXPECTED    Desmethylcitalopram            PRESENT      EXPECTED     Desmethylcitalopram is an expected metabolite of citalopram or     the enantiomeric form, escitalopram.    Aripiprazole                   PRESENT      EXPECTED    Metoprolol                     PRESENT      EXPECTED  Drug Absen                           t but Declared for Prescription Verification    Amphetamine                    Not Detected UNEXPECTED ng/mg creat  ====================================================================  Test                      Result    Flag   Units      Ref Range    Creatinine              30               mg/dL      >=20  ====================================================================  Declared Medications:   The flagging and interpretation on this report are based on the   following declared medications.  Unexpected results may arise from   inaccuracies in the declared medications.   **Note: The testing scope of this panel includes these medications:   Amphetamine (Adderall)   Aripiprazole (Abilify)   Bupropion (Wellbutrin)   Escitalopram (Lexapro)   Methylphenidate (Jornay PM)   Metoprolol (Toprol)   **Note: The testing scope of this panel does not include following   reported medications:   Apixaban (Eliquis)   Buspirone (BuSpar)   Loratadine (Claritin)   Montelukast (Singulair)  ==========                           ==========================================================  For clinical consultation, please call [phone].  ====================================================================         Assessment / Plan    Diagnoses and all orders for this visit:    1. Attention deficit hyperactivity disorder (ADHD), combined  type, mild (Primary)  -     Methylphenidate HCl ER, PM, (Jornay PM) 80 MG capsule sustained-release 24 hr; Take 80 mg by mouth Every Night.  Dispense: 30 capsule; Refill: 0    2. Major depressive disorder, recurrent episode, moderate  -     ARIPiprazole (Abilify) 2 MG tablet; Take 1 tablet by mouth Every Night.  Dispense: 90 tablet; Refill: 0  -     buPROPion XL (WELLBUTRIN XL) 300 MG 24 hr tablet; Take 1 tablet by mouth Every Morning.  Dispense: 90 tablet; Refill: 0  -     escitalopram (LEXAPRO) 20 MG tablet; Take 1 tablet by mouth Daily.  Dispense: 90 tablet; Refill: 0    3. Generalized anxiety disorder  -     ARIPiprazole (Abilify) 2 MG tablet; Take 1 tablet by mouth Every Night.  Dispense: 90 tablet; Refill: 0  -     buPROPion XL (WELLBUTRIN XL) 300 MG 24 hr tablet; Take 1 tablet by mouth Every Morning.  Dispense: 90 tablet; Refill: 0  -     busPIRone (BUSPAR) 15 MG tablet; Take 1 tablet by mouth 2 (Two) Times a Day.  Dispense: 180 tablet; Refill: 0  -     escitalopram (LEXAPRO) 20 MG tablet; Take 1 tablet by mouth Daily.  Dispense: 90 tablet; Refill: 0    4. Psychophysiological insomnia     Patient appears to be doing well on current medication. No issues reported and no indication for change. Will continue medication as ordered.     History and physical exam exhibit continued safe and appropriate use of controlled substance.    As part of patient's treatment plan I am prescribing a controlled substance.  The patient has been made aware of the appropriate use of such medications and potential for dependence and/or overdose.  It has also been made clear that these medications are for use by this patient only, without concomitant use of alcohol or other substances, unless prescribed.    Patient has completed a prescribing agreement detailing terms of continued prescribing of controlled substances, including monitoring MIRYAM reports, urine drug screening, and pill counts if necessary.  Patient is aware that  inappropriate use will result in cessation of prescribing such medications.    PHQ-9 Depression Screening  Little interest or pleasure in doing things? Several days   Feeling down, depressed, or hopeless? Several days   Trouble falling or staying asleep, or sleeping too much? Not at all   Feeling tired or having little energy? Several days   Poor appetite or overeating? Several days   Feeling bad about yourself - or that you are a failure or have let yourself or your family down? Over half   Trouble concentrating on things, such as reading the newspaper or watching television? Over half   Moving or speaking so slowly that other people could have noticed? Or the opposite - being so fidgety or restless that you have been moving around a lot more than usual? Several days   Thoughts that you would be better off dead, or of hurting yourself in some way? Not at all   PHQ-9 Total Score 9   If you checked off any problems, how difficult have these problems made it for you to do your work, take care of things at home, or get along with other people? Not difficult at all     PHQ-9 Score:   PHQ-9 Total Score: 9    Depression Screening:  Patient screened positive for depression based on a PHQ-9 score of 9 on 12/17/2024. Follow-up recommendations include: Prescribed antidepressant medication treatment and Suicide Risk Assessment performed.        SAMIA-7  Over the last two weeks, how often have you been bothered by the following problems?  Feeling nervous, anxious or on edge: Not at all  Not being able to stop or control worrying: More than half the days  Worrying too much about different things: More than half the days  Trouble Relaxing: Not at all  Being so restless that it is hard to sit still: Not at all  Becoming easily annoyed or irritable: Not at all  Feeling afraid as if something awful might happen: Not at all  SAMIA 7 Total Score: 4  If you checked any problems, how difficult have these problems made it for you to do your  work, take care of things at home, or get along with other people: Not difficult at all      ADHD  Screening for Adults With ADHD - (1-6)  1. How often do you have trouble wrapping up the final details of a project, once the challenging parts have been done?: Sometimes  2. How often do you have difficulty getting things in order when you have to do a task that requires organization?: Sometimes  3. How often do you have problems remembering appointments or obligations : Sometimes  4. When you have a task that requires a lot of thought, how often do you avoid or delay getting started ?: Often  5. How often do you fidget or squirm with your hands or feet when you have to sit down for a long time?: Rarely  6. How often do you feel overly active and compelled to do things, like you were driven by a motor?: Never  7. How often do you make careless mistakes when you have to work on a boring or difficult project?: Rarely  8. How often do have difficulty keeping your attention when you are doing boring or repetitive work?: Often  9. How often do you have difficulty concentrating on what people say to you, even when they are speaking to you: Often  10.How often do you misplace or have difficulty finding things at home or at work?: Rarely  11.How often are you distracted by activity or noise around you?: Rarely  12.How often do you leave your seat in meetings or other situations in which you are expected to remain seated?: Never  13.How often do you feel restless or fidgety?: Never  14.How often do you have difficulty unwinding and relaxing when you have time to yourself?: Never  15.How often do you find yourself talking too much when you are in social situations?: Never  16.When you’re in a conversation, how often do you find yourself finishing the sentences of the people you are talking to, before they can finish them themselves?: Never  17.How often do you have difficulty waiting your turn in situations when turn taking is  required?: Never  18.How often do you interrupt others when they are busy?: Never    A psychological evaluation was conducted in order to assess past and current level of functioning. Areas assessed included, but were not limited to: perception of social support, perception of ability to face and deal with challenges in life (positive functioning), anxiety symptoms, depressive symptoms, perspective on beliefs/belief system, coping skills for stress, intelligence level,  Therapeutic rapport was established. Interventions conducted today were geared towards incorporating medication management along with support for continued therapy. Education was also provided as to the med management with this provider and what to expect in subsequent sessions.      We discussed risks, benefits, goals and side effects of the above medication and the patient was agreeable with the plan. Patient was educated on the importance of compliance with treatment and follow-up appointments. Patient is aware to contact the [facility] with any worsening of symptoms. To call for questions or concerns and return early if necessary. Patent is agreeable to go to the Emergency Department or call 911 should they begin SI/HI.    MEDS ORDERED DURING VISIT:  New Medications Ordered This Visit   Medications    ARIPiprazole (Abilify) 2 MG tablet     Sig: Take 1 tablet by mouth Every Night.     Dispense:  90 tablet     Refill:  0    buPROPion XL (WELLBUTRIN XL) 300 MG 24 hr tablet     Sig: Take 1 tablet by mouth Every Morning.     Dispense:  90 tablet     Refill:  0    busPIRone (BUSPAR) 15 MG tablet     Sig: Take 1 tablet by mouth 2 (Two) Times a Day.     Dispense:  180 tablet     Refill:  0    escitalopram (LEXAPRO) 20 MG tablet     Sig: Take 1 tablet by mouth Daily.     Dispense:  90 tablet     Refill:  0    Methylphenidate HCl ER, PM, (Jornay PM) 80 MG capsule sustained-release 24 hr     Sig: Take 80 mg by mouth Every Night.     Dispense:  30  capsule     Refill:  0         Follow Up   Return in about 3 months (around 3/17/2025).  Patient was given instructions and counseling regarding her condition or for health maintenance advice. Please see specific information pulled into the AVS if appropriate.     TREATMENT PLAN/GOALS: Continue supportive psychotherapy efforts and medications as indicated. Treatment and medication options discussed during today's visit. Patient acknowledged and verbally consented to continue with current treatment plan and was educated on the importance of compliance with treatment and follow-up appointments.    MEDICATION ISSUES:  Discussed medication options and treatment plan of prescribed medication as well as the risks, benefits, and side effects including potential falls, possible impaired driving and metabolic adversities among others. Patient is agreeable to call the office with any worsening of symptoms or onset of side effects. Patient is agreeable to call 911 or go to the nearest ER should he/she begin having SI/HI.        [facility]  [address] [place] [address]  [phone]    December 17, 2024 16:32 EST

## 2025-01-10 ENCOUNTER — TELEPHONE (OUTPATIENT)
Dept: NEUROLOGY | Facility: CLINIC | Age: 60
End: 2025-01-10

## 2025-01-10 NOTE — TELEPHONE ENCOUNTER
Caller: VICTOR MANUEL ARRINGTON    Relationship:  PATIENT    Best call back number: [phone]    PATIENT CALLED REQUESTING TO CANCEL SAME DAY APPT.    Did the patient call AFTER the start time of their scheduled appointment?  []YES  [x]NO    Was the patient's appointment rescheduled? [x]YES  []NO    Any additional information:

## 2025-02-03 DIAGNOSIS — F90.2 ATTENTION DEFICIT HYPERACTIVITY DISORDER (ADHD), COMBINED TYPE, MILD: ICD-10-CM

## 2025-02-03 DIAGNOSIS — G08 CEREBRAL VENOUS SINUS THROMBOSIS, CHRONIC: ICD-10-CM

## 2025-02-03 RX ORDER — METHYLPHENIDATE HYDROCHLORIDE 80 MG/1
80 CAPSULE ORAL NIGHTLY
Qty: 30 CAPSULE | Refills: 0 | Status: SHIPPED | OUTPATIENT
Start: 2025-02-03

## 2025-02-03 RX ORDER — APIXABAN 5 MG/1
5 TABLET, FILM COATED ORAL
Qty: 180 TABLET | Refills: 0 | Status: SHIPPED | OUTPATIENT
Start: 2025-02-03

## 2025-03-05 DIAGNOSIS — F90.2 ATTENTION DEFICIT HYPERACTIVITY DISORDER (ADHD), COMBINED TYPE, MILD: ICD-10-CM

## 2025-03-05 RX ORDER — METHYLPHENIDATE HYDROCHLORIDE 80 MG/1
80 CAPSULE ORAL NIGHTLY
Qty: 30 CAPSULE | Refills: 0 | Status: SHIPPED | OUTPATIENT
Start: 2025-03-05

## 2025-03-12 ENCOUNTER — TELEPHONE (OUTPATIENT)
Dept: NEUROLOGY | Facility: CLINIC | Age: 60
End: 2025-03-12
Payer: COMMERCIAL

## 2025-03-17 ENCOUNTER — OFFICE VISIT (OUTPATIENT)
Dept: BEHAVIORAL HEALTH | Facility: CLINIC | Age: 60
End: 2025-03-17
Payer: COMMERCIAL

## 2025-03-17 VITALS
HEART RATE: 65 BPM | WEIGHT: 205 LBS | SYSTOLIC BLOOD PRESSURE: 118 MMHG | OXYGEN SATURATION: 95 % | BODY MASS INDEX: 36.32 KG/M2 | DIASTOLIC BLOOD PRESSURE: 82 MMHG | HEIGHT: 63 IN

## 2025-03-17 DIAGNOSIS — F51.04 PSYCHOPHYSIOLOGICAL INSOMNIA: ICD-10-CM

## 2025-03-17 DIAGNOSIS — F90.2 ATTENTION DEFICIT HYPERACTIVITY DISORDER (ADHD), COMBINED TYPE, MILD: Primary | ICD-10-CM

## 2025-03-17 DIAGNOSIS — F33.1 MAJOR DEPRESSIVE DISORDER, RECURRENT EPISODE, MODERATE: ICD-10-CM

## 2025-03-17 DIAGNOSIS — F41.1 GENERALIZED ANXIETY DISORDER: ICD-10-CM

## 2025-03-17 PROCEDURE — 99214 OFFICE O/P EST MOD 30 MIN: CPT

## 2025-03-17 NOTE — PROGRESS NOTES
Follow Up Office Visit    Patient Name: Naima Paul  : 1965   MRN: [id number]   Care Team: Patient Care Team:  Bela Estrella DO as PCP - General (Family Medicine)  Carol Rao APRN as Nurse Practitioner (Behavioral Health)         Chief Complaint:    Chief Complaint   Patient presents with    ADHD    Anxiety    Sleeping Problem    Depression    Med Management       History of Present Illness: Naima Paul is a 60 y.o. female who is here today for a medication management follow up.  Patient reports that overall medication continues to be effective.  She feels that her focus and concentration are doing good and her mood and anxiety have been managed well.  She also reports that she is sleeping good.  She does endorse some situational stressors, however, she feels that she is managing them well. She did not see the need to make any changes and did not have any medication concerns at today's visit. She denies SI/HI today.     The following portion of the patient's history were reviewed and updated appropriately: allergies, current and past medications, family history, medical history and social history. MIRYAM reviewed and appropriate.   Subjective   Review of Systems:    Review of Systems   Respiratory: Negative.     Cardiovascular: Negative.  Negative for chest pain and palpitations.   Neurological:  Negative for dizziness and confusion.   Psychiatric/Behavioral: Negative.  Positive for stress.    All other systems reviewed and are negative.      Current Medications:   Current Outpatient Medications   Medication Sig Dispense Refill    ARIPiprazole (Abilify) 2 MG tablet Take 1 tablet by mouth Every Night. 90 tablet 0    buPROPion XL (WELLBUTRIN XL) 300 MG 24 hr tablet Take 1 tablet by mouth Every Morning. 90 tablet 0    busPIRone (BUSPAR) 15 MG tablet Take 1 tablet by mouth 2 (Two) Times a Day. 180 tablet 0    escitalopram (LEXAPRO) 20 MG tablet Take 1 tablet by mouth Daily. 90 tablet 0     "amphetamine-dextroamphetamine (Adderall) 20 MG tablet Take 1 tablet by mouth Daily. 30 tablet 0    dicyclomine (BENTYL) 20 MG tablet Take 1 tablet by mouth 3 times a day.      Eliquis 5 MG tablet tablet TAKE 1 TABLET BY MOUTH EVERY 12 HOURS 180 tablet 0    loratadine (CLARITIN) 10 MG tablet Take 1 tablet by mouth Daily.      Methylphenidate HCl ER, PM, (Jornay PM) 80 MG capsule sustained-release 24 hr Take 80 mg by mouth Every Night. 30 capsule 0    metoprolol succinate XL (TOPROL-XL) 50 MG 24 hr tablet metoprolol succinate ER 50 mg tablet,extended release 24 hr   TAKE 1 TABLET BY MOUTH ONCE DAILY \"PT MUST BE SEEN FOR ADDITIONAL REFILS\"      montelukast (SINGULAIR) 10 MG tablet Take 1 tablet by mouth Every Night.       No current facility-administered medications for this visit.       Mental Status Exam:   Hygiene:   good  Cooperation:  Cooperative  Eye Contact:  Good  Psychomotor Behavior:  Appropriate  Affect:  Appropriate  Mood: normal  Speech:  Normal  Thought Process:  Goal directed and Linear  Thought Content:  Normal and Mood congruent  Suicidal:  None  Homicidal:  None  Hallucinations:  None  Delusion:  None  Memory:  Intact  Orientation:  Person, Place, Time, and Situation  Reliability:  good  Insight:  Good  Judgement:  Good  Impulse Control:  Good  Physical/Medical Issues:  Yes see chart       Objective   Vital Signs:   /82   Pulse 65   Ht 160 cm (63\")   Wt 93 kg (205 lb)   SpO2 95%   BMI 36.31 kg/m²         Lab Results:   Lab Results   Component Value Date    WBC 7.35 04/01/2022    HGB 11.9 (L) 04/01/2022    HCT 39.2 04/01/2022    MCV 91.4 04/01/2022     04/01/2022        Lab Results   Component Value Date    GLUCOSE 128 (H) 04/01/2022    BUN 7 04/01/2022    CREATININE 0.54 (L) 04/01/2022     04/01/2022    K 4.1 04/01/2022     04/01/2022    CALCIUM 8.9 04/01/2022    PROTEINTOT 7.3 03/30/2022    ALBUMIN 4.30 03/30/2022    ALT 16 03/30/2022    AST 18 03/30/2022    ALKPHOS 99 " 03/30/2022    BILITOT 0.2 03/30/2022    GLOB 3.0 03/30/2022    AGRATIO 1.4 03/30/2022    BCR 13.0 04/01/2022    ANIONGAP 10.0 04/01/2022    EGFR 107.5 04/01/2022        Lab Results   Component Value Date    CHOL 151 03/31/2022    TRIG 111 03/31/2022    HDL 32 (L) 03/31/2022    LDL 99 03/31/2022        Lab Results   Component Value Date    HGBA1C 5.90 (H) 03/31/2022        Lab Results   Component Value Date    TSH 0.879 03/31/2022            Assessment / Plan    Diagnoses and all orders for this visit:    1. Attention deficit hyperactivity disorder (ADHD), combined type, mild (Primary)    2. Major depressive disorder, recurrent episode, moderate  -     ARIPiprazole (Abilify) 2 MG tablet; Take 1 tablet by mouth Every Night.  Dispense: 90 tablet; Refill: 0  -     buPROPion XL (WELLBUTRIN XL) 300 MG 24 hr tablet; Take 1 tablet by mouth Every Morning.  Dispense: 90 tablet; Refill: 0  -     escitalopram (LEXAPRO) 20 MG tablet; Take 1 tablet by mouth Daily.  Dispense: 90 tablet; Refill: 0    3. Generalized anxiety disorder  -     ARIPiprazole (Abilify) 2 MG tablet; Take 1 tablet by mouth Every Night.  Dispense: 90 tablet; Refill: 0  -     buPROPion XL (WELLBUTRIN XL) 300 MG 24 hr tablet; Take 1 tablet by mouth Every Morning.  Dispense: 90 tablet; Refill: 0  -     busPIRone (BUSPAR) 15 MG tablet; Take 1 tablet by mouth 2 (Two) Times a Day.  Dispense: 180 tablet; Refill: 0  -     escitalopram (LEXAPRO) 20 MG tablet; Take 1 tablet by mouth Daily.  Dispense: 90 tablet; Refill: 0    4. Psychophysiological insomnia     Patient appears to be doing well on current medication. No issues reported and no indication for change. Will continue medication as ordered.     History and physical exam exhibit continued safe and appropriate use of controlled substance.    As part of patient's treatment plan I am prescribing a controlled substance.  The patient has been made aware of the appropriate use of such medications and potential for  dependence and/or overdose.  It has also been made clear that these medications are for use by this patient only, without concomitant use of alcohol or other substances, unless prescribed.    Patient/guardian has completed a prescribing agreement detailing terms of continued prescribing of controlled substances, including monitoring MIRYAM reports, urine drug screening, and pill counts if necessary.  Patient is aware that inappropriate use will result in cessation of prescribing such medications.    At this time, patient is being prescribed an antipsychotic medication.  The risks, benefits and potential side effects of these medications have been reviewed with the patient/guardian.  I have also discussed with the patient/guardian that while on these medications the following labs should be drawn yearly.  Those labs include, a CBC, a CMP, a lipid panel, A1c, and Thyroid labs.  Encouraged patient/guardian to discuss these labs with their primary care provider.      AIMS  Facial and Oral Movements  Muscles of Facial Expression: None, normal  Lips and Perioral Area: None, normal  Jaw: None, normal  Tongue: None, normal  Extremity Movements  Upper (arms, wrists, hands, fingers): None, normal  Lower (legs, knees, ankles, toes): None, normal  Trunk Movements  Neck, shoulders, hips: None, normal  Overall Severity  Severity of abnormal movements (max 4): 0  Incapacitation due to abnormal movements: None, normal  Patient's awareness of abnormal movements (rate only patient's report): Aware, no distress  Dental Status  Current problems with teeth and/or dentures?: No  Does patient usually wear dentures?: No      PHQ-9 Depression Screening  Little interest or pleasure in doing things? Several days   Feeling down, depressed, or hopeless? Several days   Trouble falling or staying asleep, or sleeping too much? Not at all   Feeling tired or having little energy? Several days   Poor appetite or overeating? Not at all   Feeling bad  about yourself - or that you are a failure or have let yourself or your family down? Over half   Trouble concentrating on things, such as reading the newspaper or watching television? Several days   Moving or speaking so slowly that other people could have noticed? Or the opposite - being so fidgety or restless that you have been moving around a lot more than usual? Not at all   Thoughts that you would be better off dead, or of hurting yourself in some way? Not at all   PHQ-9 Total Score 6   If you checked off any problems, how difficult have these problems made it for you to do your work, take care of things at home, or get along with other people? Not difficult at all     PHQ-9 Score:   PHQ-9 Total Score: 6    Depression Screening:  Patient screened positive for depression based on a PHQ-9 score of 6 on 3/17/2025. Follow-up recommendations include: Prescribed antidepressant medication treatment, Suicide Risk Assessment performed, and Continue with medication management.        SAMIA-7  Over the last two weeks, how often have you been bothered by the following problems?  Feeling nervous, anxious or on edge: Several days  Not being able to stop or control worrying: More than half the days  Worrying too much about different things: More than half the days  Trouble Relaxing: Several days  Being so restless that it is hard to sit still: Not at all  Becoming easily annoyed or irritable: Not at all  Feeling afraid as if something awful might happen: Not at all  SAMIA 7 Total Score: 6  If you checked any problems, how difficult have these problems made it for you to do your work, take care of things at home, or get along with other people: Not difficult at all      ADHD  Screening for Adults With ADHD - (1-6)  1. How often do you have trouble wrapping up the final details of a project, once the challenging parts have been done?: Often  2. How often do you have difficulty getting things in order when you have to do a task that  requires organization?: Rarely  3. How often do you have problems remembering appointments or obligations : Rarely  4. When you have a task that requires a lot of thought, how often do you avoid or delay getting started ?: Sometimes  5. How often do you fidget or squirm with your hands or feet when you have to sit down for a long time?: Often  6. How often do you feel overly active and compelled to do things, like you were driven by a motor?: Rarely  7. How often do you make careless mistakes when you have to work on a boring or difficult project?: Rarely  8. How often do have difficulty keeping your attention when you are doing boring or repetitive work?: Rarely  9. How often do you have difficulty concentrating on what people say to you, even when they are speaking to you: Often  10.How often do you misplace or have difficulty finding things at home or at work?: Rarely  11.How often are you distracted by activity or noise around you?: Often  12.How often do you leave your seat in meetings or other situations in which you are expected to remain seated?: Never  13.How often do you feel restless or fidgety?: Rarely  14.How often do you have difficulty unwinding and relaxing when you have time to yourself?: Rarely  15.How often do you find yourself talking too much when you are in social situations?: Never  16.When you’re in a conversation, how often do you find yourself finishing the sentences of the people you are talking to, before they can finish them themselves?: Never  17.How often do you have difficulty waiting your turn in situations when turn taking is required?: Never  18.How often do you interrupt others when they are busy?: Never    A psychological evaluation was conducted in order to assess past and current level of functioning. Areas assessed included, but were not limited to: perception of social support, perception of ability to face and deal with challenges in life (positive functioning), anxiety  symptoms, depressive symptoms, perspective on beliefs/belief system, coping skills for stress, intelligence level,  Therapeutic rapport was established. Interventions conducted today were geared towards incorporating medication management along with support for continued therapy. Education was also provided as to the med management with this provider and what to expect in subsequent sessions.      We discussed risks, benefits, goals and side effects of the above medication and the patient was agreeable with the plan. Patient was educated on the importance of compliance with treatment and follow-up appointments. Patient is aware to contact the [facility] with any worsening of symptoms. To call for questions or concerns and return early if necessary. Patent is agreeable to go to the Emergency Department or call 911 should they begin SI/HI.    MEDS ORDERED DURING VISIT:  New Medications Ordered This Visit   Medications    ARIPiprazole (Abilify) 2 MG tablet     Sig: Take 1 tablet by mouth Every Night.     Dispense:  90 tablet     Refill:  0    buPROPion XL (WELLBUTRIN XL) 300 MG 24 hr tablet     Sig: Take 1 tablet by mouth Every Morning.     Dispense:  90 tablet     Refill:  0    busPIRone (BUSPAR) 15 MG tablet     Sig: Take 1 tablet by mouth 2 (Two) Times a Day.     Dispense:  180 tablet     Refill:  0    escitalopram (LEXAPRO) 20 MG tablet     Sig: Take 1 tablet by mouth Daily.     Dispense:  90 tablet     Refill:  0         Follow Up   Return in about 3 months (around 6/17/2025).  Patient was given instructions and counseling regarding her condition or for health maintenance advice. Please see specific information pulled into the AVS if appropriate.     TREATMENT PLAN/GOALS: Continue supportive psychotherapy efforts and medications as indicated. Treatment and medication options discussed during today's visit. Patient acknowledged and verbally consented to continue with current treatment plan and was educated on  the importance of compliance with treatment and follow-up appointments.    MEDICATION ISSUES:  Discussed medication options and treatment plan of prescribed medication as well as the risks, benefits, and side effects including potential falls, possible impaired driving and metabolic adversities among others. Patient is agreeable to call the office with any worsening of symptoms or onset of side effects. Patient is agreeable to call 911 or go to the nearest ER should he/she begin having SI/HI.        [facility]  [address] [place] [address] [address] [place] [address]  [phone]    March 18, 2025 12:03 EDT

## 2025-03-18 RX ORDER — ESCITALOPRAM OXALATE 20 MG/1
20 TABLET ORAL DAILY
Qty: 90 TABLET | Refills: 0 | Status: SHIPPED | OUTPATIENT
Start: 2025-03-18

## 2025-03-18 RX ORDER — BUSPIRONE HYDROCHLORIDE 15 MG/1
15 TABLET ORAL 2 TIMES DAILY
Qty: 180 TABLET | Refills: 0 | Status: SHIPPED | OUTPATIENT
Start: 2025-03-18

## 2025-03-18 RX ORDER — BUPROPION HYDROCHLORIDE 300 MG/1
300 TABLET ORAL EVERY MORNING
Qty: 90 TABLET | Refills: 0 | Status: SHIPPED | OUTPATIENT
Start: 2025-03-18

## 2025-03-18 RX ORDER — ARIPIPRAZOLE 2 MG/1
2 TABLET ORAL NIGHTLY
Qty: 90 TABLET | Refills: 0 | Status: SHIPPED | OUTPATIENT
Start: 2025-03-18

## 2025-03-19 ENCOUNTER — OFFICE VISIT (OUTPATIENT)
Dept: NEUROLOGY | Facility: CLINIC | Age: 60
End: 2025-03-19
Payer: COMMERCIAL

## 2025-03-19 VITALS
WEIGHT: 211 LBS | SYSTOLIC BLOOD PRESSURE: 130 MMHG | HEIGHT: 63 IN | BODY MASS INDEX: 37.39 KG/M2 | HEART RATE: 80 BPM | DIASTOLIC BLOOD PRESSURE: 88 MMHG

## 2025-03-19 DIAGNOSIS — R41.841 COGNITIVE COMMUNICATION DISORDER: ICD-10-CM

## 2025-03-19 DIAGNOSIS — G08 CEREBRAL VENOUS SINUS THROMBOSIS, CHRONIC: Primary | ICD-10-CM

## 2025-03-19 PROBLEM — J30.9 ALLERGIC RHINITIS: Status: ACTIVE | Noted: 2024-04-23

## 2025-03-19 PROCEDURE — 99214 OFFICE O/P EST MOD 30 MIN: CPT | Performed by: NURSE PRACTITIONER

## 2025-03-19 NOTE — LETTER
March 19, 2025     Bela Estrella DO  [address]  [place] [id number]    Patient: Naima Paul   YOB: 1965   Date of Visit: 3/19/2025       Dear Bela Estrella DO    Naima Paul was in my office today. Below is a copy of my note.    If you have questions, please do not hesitate to call me. I look forward to following Naima along with you.         Sincerely,        BERE Elmore        CC: BERE Osborn    Subjective:     Patient ID: Naima Paul is a 60 y.o. female.    CC:   Chief Complaint   Patient presents with   • cognitive communication disorder   • chronic venous sinus thrombosis       HPI:   History of Present Illness  This is a 60-year-old female who presents for medication refills. She has been followed once a year for cognitive communication disorder, chronic cerebral venous sinus thrombosis, and episodic headaches that have all been present since March 2022. She is on anticoagulation and has had minimal headaches. She does follow with psychiatry for attention deficit as well as anxiety and depression. She is on Eliquis lifelong per Neurosurgery recommendations. She takes Tylenol if needed. She did complete several speech language pathology visits in 2024.    She reports no significant changes in her health status since the last visit. She has not required any surgical interventions or hospital admissions. She has not experienced any falls within the past year. Her memory remains intact, and she found the summer sessions with Alee Wong, SLP, beneficial. She was advised to return if she perceives a decline in her condition. She is not experiencing severe headaches but occasionally feels mild pressure on the right side of her head. She reports no sharp or stabbing pain and no visual disturbances.     She continues her regimen of Eliquis, taking one tablet twice daily. She expresses a desire to discontinue Eliquis but acknowledges the  necessity of its continuation. She reports no excessive bleeding, even when she sustains a cut. She does not monitor her blood pressure at home. She reports no excessive bleeding or bruising. Her primary care physician conducts regular lab tests.    She continues to receive counseling services. Her psychiatric medications are managed by [facility].    Prior extensive neurological workup & history:  She was admitted to [facility] from 03/30/2022 until 04/03/2022 for symptoms of right (laterality clarified by patient) arm weakness, paresthesias, and syncope. She had been diagnosed with COVID-19 on 02/15/2022, and her symptoms had improved significantly. She developed acute episodes of numbness that waxed, and waned, and had some transient vision loss, and syncope. At home, her  found her unresponsive on the floor. She was admitted, and underwent neuroimaging with MRI of the brain without contrast showing no acute stroke or acute intracranial abnormalities. MRA venogram completed on 03/31/2022 showed right transverse, and right sigmoid sinus, venous sinus thrombosis. She also underwent a CT cerebral perfusion with and without contrast, which showed no ischemia. She underwent a CTA of the neck which showed no significant abnormalities, and CTA of the head showed no significant abnormalities. She was started on heparin, and was treated with gabapentin for some headaches she was experiencing. She was discharged home on Eliquis.     During her hospitalization, she also underwent echocardiogram with agitated saline, and this showed a normal ejection fraction of 61 to 65 percent with negative bubble study. Inpatient neurology consultation was completed by Dr. Carbajal, neurology, and her recommendations included the Eliquis 5 mg twice daily for a minimum of 3 months, continuing gabapentin 300 mg 3 times daily for her headaches, and avoiding over-the-counter medications for her  headaches to avoid overuse. She also underwent an EEG at the time, and this was normal.     The patient states that 1 month before being admitted to the hospital she had contracted COVID-19.      The patient denies previous thrombus, DVT or PE. She denies family history of bleeding or clotting disorders.      She has anaphylactic reaction to Motrin. She states she has never had any trouble taking aspirin.     She was evaluated by Dr. Davion De Los Santos with [facility] on 07/25/2022. He reviewed her MRA venogram completed on 07/06/2022, and this did show persistent loss of signal intensity within the right transverse sinus and right sigmoid sinus consistent with persistent venous sinus thrombosis. This was completed on 07/06/2022 and compared to 03/31/2022. He felt that the thrombosis of the superior sagittal sinus and right transverse sigmoid sinus were likely attributable to hypercoagulability and recent COVID-19 infection. He recommended that she continue Eliquis. He also noted that this is likely her new baseline. She does not require any surgical intervention.      She has a history of sammy COVID-19 prior to her hospitalization in March 2022.     The patient's highest level of education is some college. She is retired. 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     Chronic cerebral venous sinus thrombosis along with headaches present since March 2022. She is on anticoagulation. She has reported some issues with short-term memory. Vitamin B12, folate, and methylmalonic acid levels were checked in 2022 and within normal limits.      She completed SLP for cognitive rehab in 2023 and found this helpful.  SLP cognitive rehab 2024 helpful.     Updated eye exam annually.    The following portions of the patient's history were reviewed and updated as appropriate: allergies, current medications, past family history, past medical history, past social history, past surgical history, and problem  list.    Past Medical History:   Diagnosis Date   • ADHD (attention deficit hyperactivity disorder) March 2023   • Allergic    • Anemia    • Anxiety    • CTS (carpal tunnel syndrome) March 1994   • Deep vein thrombosis March 30,2022   • Depression    • Diabetes    • Headache, migraine    • Hypertension    • Obstructive sleep apnea syndrome 08/07/2022   • Other headache syndrome 02/16/2023   • Peripheral neuropathy    • Shingles    • Sleep apnea    • Syncope and collapse 03/30/2022       Past Surgical History:   Procedure Laterality Date   • ADENOIDECTOMY     • CHOLECYSTECTOMY     • COLONOSCOPY  2016   • DIAGNOSTIC LAPAROSCOPY     • ENDOMETRIAL ABLATION     • TONSILLECTOMY         Social History     Socioeconomic History   • Marital status:    Tobacco Use   • Smoking status: Never   • Smokeless tobacco: Never   Vaping Use   • Vaping status: Never Used   Substance and Sexual Activity   • Alcohol use: Yes     Alcohol/week: 2.0 standard drinks of alcohol     Types: 2 Drinks containing 0.5 oz of alcohol per week     Comment: occ 2 per week   • Drug use: Never   • Sexual activity: Yes     Partners: Male     Birth control/protection: Post-menopausal       Family History   Problem Relation Age of Onset   • Hypertension Mother    • Cancer Mother    • Seizures Mother    • Arthritis Mother    • Drug abuse Father    • Hypertension Father    • Cancer Father    • Alcohol abuse Father    • Depression Father    • Arthritis Father    • Hyperlipidemia Father    • Stroke Paternal Grandfather    • Arthritis Paternal Grandmother           Current Outpatient Medications:   •  amphetamine-dextroamphetamine (Adderall) 20 MG tablet, Take 1 tablet by mouth Daily., Disp: 30 tablet, Rfl: 0  •  apixaban (Eliquis) 5 MG tablet tablet, Take 1 tablet by mouth 2 (Two) Times a Day., Disp: 180 tablet, Rfl: 3  •  ARIPiprazole (Abilify) 2 MG tablet, Take 1 tablet by mouth Every Night., Disp: 90 tablet, Rfl: 0  •  buPROPion XL (WELLBUTRIN XL) 300  "MG 24 hr tablet, Take 1 tablet by mouth Every Morning., Disp: 90 tablet, Rfl: 0  •  busPIRone (BUSPAR) 15 MG tablet, Take 1 tablet by mouth 2 (Two) Times a Day., Disp: 180 tablet, Rfl: 0  •  escitalopram (LEXAPRO) 20 MG tablet, Take 1 tablet by mouth Daily., Disp: 90 tablet, Rfl: 0  •  loratadine (CLARITIN) 10 MG tablet, Take 1 tablet by mouth Daily., Disp: , Rfl:   •  Methylphenidate HCl ER, PM, (Jornay PM) 80 MG capsule sustained-release 24 hr, Take 80 mg by mouth Every Night., Disp: 30 capsule, Rfl: 0  •  metoprolol succinate XL (TOPROL-XL) 50 MG 24 hr tablet, metoprolol succinate ER 50 mg tablet,extended release 24 hr  TAKE 1 TABLET BY MOUTH ONCE DAILY \"PT MUST BE SEEN FOR ADDITIONAL REFILS\", Disp: , Rfl:   •  montelukast (SINGULAIR) 10 MG tablet, Take 1 tablet by mouth Every Night., Disp: , Rfl:      Review of Systems   Neurological:  Negative for headaches.   Psychiatric/Behavioral:  Positive for decreased concentration.    All other systems reviewed and are negative.       Objective:  /88 (BP Location: Left arm, Patient Position: Sitting, Cuff Size: Adult)   Pulse 80   Ht 160 cm (63\")   Wt 95.7 kg (211 lb)   BMI 37.38 kg/m²     Neurological Exam  Mental Status  Alert. Oriented to person, place, time and situation. Speech is normal. Language is fluent with no aphasia. Attention and concentration are normal. Fund of knowledge is abnormal. Knowledge: Minimal cognitive communication disorder.    Cranial Nerves  CN II: Visual acuity is normal. Visual fields full to confrontation.  CN III, IV, VI: Extraocular movements intact bilaterally. Normal lids and orbits bilaterally. Pupils equal round and reactive to light bilaterally.  CN V: Facial sensation is normal.  CN VII: Full and symmetric facial movement.  CN IX, X: Palate elevates symmetrically. Normal gag reflex.  CN XI: Shoulder shrug strength is normal.  CN XII: Tongue midline without atrophy or fasciculations.    Motor  Normal muscle bulk throughout. " No fasciculations present. Normal muscle tone. No abnormal involuntary movements. Strength is 5/5 throughout all four extremities.    Reflexes  Deep tendon reflexes are 2+ and symmetric in all four extremities.    Coordination    Finger-to-nose, rapid alternating movements and heel-to-shin normal bilaterally without dysmetria.    Gait  Normal casual, toe, heel and tandem gait. Able to rise from chair without using arms.        Physical Exam  Constitutional:       Appearance: Normal appearance.   Eyes:      General: Lids are normal.      Extraocular Movements: Extraocular movements intact.      Pupils: Pupils are equal, round, and reactive to light.   Neurological:      Mental Status: She is alert.      Motor: Motor strength is normal.     Coordination: Coordination is intact.      Deep Tendon Reflexes: Reflexes are normal and symmetric.   Psychiatric:         Mood and Affect: Mood and affect normal.         Speech: Speech normal.         Behavior: Behavior is slowed.         Thought Content: Thought content normal.         Judgment: Judgment normal.       Results:  Results  Testing  [place] cognitive assessment score is a 30 out of 30.  Prior MOCA 29/30    Assessment/Plan:     Diagnoses and all orders for this visit:    1. Cerebral venous sinus thrombosis, chronic (Primary)  -     apixaban (Eliquis) 5 MG tablet tablet; Take 1 tablet by mouth 2 (Two) Times a Day.  Dispense: 180 tablet; Refill: 3    2. Cognitive communication disorder  Comments:  stable           Assessment & Plan  1. Cognitive communication disorder.  Her cognitive testing remains stable with a [place] Cognitive Assessment score of 30 out of 30. She has completed speech language pathology exercises and several visits in 2024.    2. Chronic cerebral venous sinus thrombosis.  She is on lifelong anticoagulation with Eliquis, taking 1 pill twice a day. She reports no significant bleeding issues. A repeat MR venogram was offered but she wishes to  continue her current medications without repeating the MRV. She verbalized understanding and agrees with the plan. Discussed Eliquis was recommended lifelong per Neurosurgery.    3. Headaches.  She reports minimal headaches with occasional pressure on the right side of her head but no sharp or stabbing pain. No vision changes were noted.    4. Continue with [facility] and her counselor as scheduled.    Follow-up  She will follow up in 1 year for reevaluation of symptoms or sooner if needed.    Reviewed medications, potential side effects and signs and symptoms to report. Discussed risk versus benefits of treatment plan with patient and/or family-including medications, labs and radiology that may be ordered. Addressed questions and concerns during visit. Patient and/or family verbalized understanding and agree with plan.    During this visit the following were done:  Labs Reviewed []    Labs Ordered []    Radiology Reports Reviewed []    Radiology Ordered []    PCP Records Reviewed []    Referring Provider Records Reviewed []    ER Records Reviewed []    Hospital Records Reviewed []    History Obtained From Family []    Radiology Images Reviewed []    Other Reviewed []    Records Requested [x]  Labs from pcp routine cbc/cmp    03/19/25   13:48 EDT    Patient or patient representative verbalized consent for the use of Ambient Listening during the visit with  BERE Elmore for chart documentation. 3/19/2025  15:09 EDT    Note to patient: The 21st Century Cures Act makes medical notes like these available to patients in the interest of transparency. However, be advised this is a medical document. It is intended as peer to peer communication. It is written in medical language and may contain abbreviations or verbiage that are unfamiliar. It may appear blunt or direct. Medical documents are intended to carry relevant information, facts as evident, and the clinical opinion of the provider.

## 2025-03-19 NOTE — PROGRESS NOTES
Subjective:     Patient ID: Naima Paul is a 60 y.o. female.    CC:   Chief Complaint   Patient presents with    cognitive communication disorder    chronic venous sinus thrombosis       HPI:   History of Present Illness  This is a 60-year-old female who presents for medication refills. She has been followed once a year for cognitive communication disorder, chronic cerebral venous sinus thrombosis, and episodic headaches that have all been present since March 2022. She is on anticoagulation and has had minimal headaches. She does follow with psychiatry for attention deficit as well as anxiety and depression. She is on Eliquis lifelong per Neurosurgery recommendations. She takes Tylenol if needed. She did complete several speech language pathology visits in 2024.    She reports no significant changes in her health status since the last visit. She has not required any surgical interventions or hospital admissions. She has not experienced any falls within the past year. Her memory remains intact, and she found the summer sessions with Alee Wong, SLP, beneficial. She was advised to return if she perceives a decline in her condition. She is not experiencing severe headaches but occasionally feels mild pressure on the right side of her head. She reports no sharp or stabbing pain and no visual disturbances.     She continues her regimen of Eliquis, taking one tablet twice daily. She expresses a desire to discontinue Eliquis but acknowledges the necessity of its continuation. She reports no excessive bleeding, even when she sustains a cut. She does not monitor her blood pressure at home. She reports no excessive bleeding or bruising. Her primary care physician conducts regular lab tests.    She continues to receive counseling services. Her psychiatric medications are managed by [facility].    Prior extensive neurological workup & history:  She was admitted to [facility] from 03/30/2022 until  04/03/2022 for symptoms of right (laterality clarified by patient) arm weakness, paresthesias, and syncope. She had been diagnosed with COVID-19 on 02/15/2022, and her symptoms had improved significantly. She developed acute episodes of numbness that waxed, and waned, and had some transient vision loss, and syncope. At home, her  found her unresponsive on the floor. She was admitted, and underwent neuroimaging with MRI of the brain without contrast showing no acute stroke or acute intracranial abnormalities. MRA venogram completed on 03/31/2022 showed right transverse, and right sigmoid sinus, venous sinus thrombosis. She also underwent a CT cerebral perfusion with and without contrast, which showed no ischemia. She underwent a CTA of the neck which showed no significant abnormalities, and CTA of the head showed no significant abnormalities. She was started on heparin, and was treated with gabapentin for some headaches she was experiencing. She was discharged home on Eliquis.     During her hospitalization, she also underwent echocardiogram with agitated saline, and this showed a normal ejection fraction of 61 to 65 percent with negative bubble study. Inpatient neurology consultation was completed by Dr. Carbajal, neurology, and her recommendations included the Eliquis 5 mg twice daily for a minimum of 3 months, continuing gabapentin 300 mg 3 times daily for her headaches, and avoiding over-the-counter medications for her headaches to avoid overuse. She also underwent an EEG at the time, and this was normal.     The patient states that 1 month before being admitted to the hospital she had contracted COVID-19.      The patient denies previous thrombus, DVT or PE. She denies family history of bleeding or clotting disorders.      She has anaphylactic reaction to Motrin. She states she has never had any trouble taking aspirin.     She was evaluated by Dr. Davion De Los Santos with [facility], [place], operated by [facility] on  07/25/2022. He reviewed her MRA venogram completed on 07/06/2022, and this did show persistent loss of signal intensity within the right transverse sinus and right sigmoid sinus consistent with persistent venous sinus thrombosis. This was completed on 07/06/2022 and compared to 03/31/2022. He felt that the thrombosis of the superior sagittal sinus and right transverse sigmoid sinus were likely attributable to hypercoagulability and recent COVID-19 infection. He recommended that she continue Eliquis. He also noted that this is likely her new baseline. She does not require any surgical intervention.      She has a history of sammy COVID-19 prior to her hospitalization in March 2022.     The patient's highest level of education is some college. She is retired. 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     Chronic cerebral venous sinus thrombosis along with headaches present since March 2022. She is on anticoagulation. She has reported some issues with short-term memory. Vitamin B12, folate, and methylmalonic acid levels were checked in 2022 and within normal limits.      She completed SLP for cognitive rehab in 2023 and found this helpful.  SLP cognitive rehab 2024 helpful.     Updated eye exam annually.    The following portions of the patient's history were reviewed and updated as appropriate: allergies, current medications, past family history, past medical history, past social history, past surgical history, and problem list.    Past Medical History:   Diagnosis Date    ADHD (attention deficit hyperactivity disorder) March 2023    Allergic     Anemia     Anxiety     CTS (carpal tunnel syndrome) March 1994    Deep vein thrombosis March 30,2022    Depression     Diabetes     Headache, migraine     Hypertension     Obstructive sleep apnea syndrome 08/07/2022    Other headache syndrome 02/16/2023    Peripheral neuropathy     Shingles     Sleep apnea     Syncope and collapse 03/30/2022       Past  Surgical History:   Procedure Laterality Date    ADENOIDECTOMY      CHOLECYSTECTOMY      COLONOSCOPY  2016    DIAGNOSTIC LAPAROSCOPY      ENDOMETRIAL ABLATION      TONSILLECTOMY         Social History     Socioeconomic History    Marital status:    Tobacco Use    Smoking status: Never    Smokeless tobacco: Never   Vaping Use    Vaping status: Never Used   Substance and Sexual Activity    Alcohol use: Yes     Alcohol/week: 2.0 standard drinks of alcohol     Types: 2 Drinks containing 0.5 oz of alcohol per week     Comment: occ 2 per week    Drug use: Never    Sexual activity: Yes     Partners: Male     Birth control/protection: Post-menopausal       Family History   Problem Relation Age of Onset    Hypertension Mother     Cancer Mother     Seizures Mother     Arthritis Mother     Drug abuse Father     Hypertension Father     Cancer Father     Alcohol abuse Father     Depression Father     Arthritis Father     Hyperlipidemia Father     Stroke Paternal Grandfather     Arthritis Paternal Grandmother           Current Outpatient Medications:     amphetamine-dextroamphetamine (Adderall) 20 MG tablet, Take 1 tablet by mouth Daily., Disp: 30 tablet, Rfl: 0    apixaban (Eliquis) 5 MG tablet tablet, Take 1 tablet by mouth 2 (Two) Times a Day., Disp: 180 tablet, Rfl: 3    ARIPiprazole (Abilify) 2 MG tablet, Take 1 tablet by mouth Every Night., Disp: 90 tablet, Rfl: 0    buPROPion XL (WELLBUTRIN XL) 300 MG 24 hr tablet, Take 1 tablet by mouth Every Morning., Disp: 90 tablet, Rfl: 0    busPIRone (BUSPAR) 15 MG tablet, Take 1 tablet by mouth 2 (Two) Times a Day., Disp: 180 tablet, Rfl: 0    escitalopram (LEXAPRO) 20 MG tablet, Take 1 tablet by mouth Daily., Disp: 90 tablet, Rfl: 0    loratadine (CLARITIN) 10 MG tablet, Take 1 tablet by mouth Daily., Disp: , Rfl:     Methylphenidate HCl ER, PM, (Jornay PM) 80 MG capsule sustained-release 24 hr, Take 80 mg by mouth Every Night., Disp: 30 capsule, Rfl: 0    metoprolol  "succinate XL (TOPROL-XL) 50 MG 24 hr tablet, metoprolol succinate ER 50 mg tablet,extended release 24 hr  TAKE 1 TABLET BY MOUTH ONCE DAILY \"PT MUST BE SEEN FOR ADDITIONAL REFILS\", Disp: , Rfl:     montelukast (SINGULAIR) 10 MG tablet, Take 1 tablet by mouth Every Night., Disp: , Rfl:      Review of Systems   Neurological:  Negative for headaches.   Psychiatric/Behavioral:  Positive for decreased concentration.    All other systems reviewed and are negative.       Objective:  /88 (BP Location: Left arm, Patient Position: Sitting, Cuff Size: Adult)   Pulse 80   Ht 160 cm (63\")   Wt 95.7 kg (211 lb)   BMI 37.38 kg/m²     Neurological Exam  Mental Status  Alert. Oriented to person, place, time and situation. Speech is normal. Language is fluent with no aphasia. Attention and concentration are normal. Fund of knowledge is abnormal. Knowledge: Minimal cognitive communication disorder.    Cranial Nerves  CN II: Visual acuity is normal. Visual fields full to confrontation.  CN III, IV, VI: Extraocular movements intact bilaterally. Normal lids and orbits bilaterally. Pupils equal round and reactive to light bilaterally.  CN V: Facial sensation is normal.  CN VII: Full and symmetric facial movement.  CN IX, X: Palate elevates symmetrically. Normal gag reflex.  CN XI: Shoulder shrug strength is normal.  CN XII: Tongue midline without atrophy or fasciculations.    Motor  Normal muscle bulk throughout. No fasciculations present. Normal muscle tone. No abnormal involuntary movements. Strength is 5/5 throughout all four extremities.    Reflexes  Deep tendon reflexes are 2+ and symmetric in all four extremities.    Coordination    Finger-to-nose, rapid alternating movements and heel-to-shin normal bilaterally without dysmetria.    Gait  Normal casual, toe, heel and tandem gait. Able to rise from chair without using arms.        Physical Exam  Constitutional:       Appearance: Normal appearance.   Eyes:      General: Lids " are normal.      Extraocular Movements: Extraocular movements intact.      Pupils: Pupils are equal, round, and reactive to light.   Neurological:      Mental Status: She is alert.      Motor: Motor strength is normal.     Coordination: Coordination is intact.      Deep Tendon Reflexes: Reflexes are normal and symmetric.   Psychiatric:         Mood and Affect: Mood and affect normal.         Speech: Speech normal.         Behavior: Behavior is slowed.         Thought Content: Thought content normal.         Judgment: Judgment normal.       Results:  Results  Testing  Sodus cognitive assessment score is a 30 out of 30.  Prior MOCA 29/30    Assessment/Plan:     Diagnoses and all orders for this visit:    1. Cerebral venous sinus thrombosis, chronic (Primary)  -     apixaban (Eliquis) 5 MG tablet tablet; Take 1 tablet by mouth 2 (Two) Times a Day.  Dispense: 180 tablet; Refill: 3    2. Cognitive communication disorder  Comments:  stable           Assessment & Plan  1. Cognitive communication disorder.  Her cognitive testing remains stable with a Sodus Cognitive Assessment score of 30 out of 30. She has completed speech language pathology exercises and several visits in 2024.    2. Chronic cerebral venous sinus thrombosis.  She is on lifelong anticoagulation with Eliquis, taking 1 pill twice a day. She reports no significant bleeding issues. A repeat MR venogram was offered but she wishes to continue her current medications without repeating the MRV. She verbalized understanding and agrees with the plan. Discussed Eliquis was recommended lifelong per Neurosurgery.    3. Headaches.  She reports minimal headaches with occasional pressure on the right side of her head but no sharp or stabbing pain. No vision changes were noted.    4. Continue with [facility] and her counselor as scheduled.    Follow-up  She will follow up in 1 year for reevaluation of symptoms or sooner if needed.    Reviewed medications,  potential side effects and signs and symptoms to report. Discussed risk versus benefits of treatment plan with patient and/or family-including medications, labs and radiology that may be ordered. Addressed questions and concerns during visit. Patient and/or family verbalized understanding and agree with plan.    During this visit the following were done:  Labs Reviewed []    Labs Ordered []    Radiology Reports Reviewed []    Radiology Ordered []    PCP Records Reviewed []    Referring Provider Records Reviewed []    ER Records Reviewed []    Hospital Records Reviewed []    History Obtained From Family []    Radiology Images Reviewed []    Other Reviewed []    Records Requested [x]  Labs from pcp routine cbc/cmp    03/19/25   13:48 EDT    Patient or patient representative verbalized consent for the use of Ambient Listening during the visit with  BERE Elmore for chart documentation. 3/19/2025  15:09 EDT    Note to patient: The 21st Century Cures Act makes medical notes like these available to patients in the interest of transparency. However, be advised this is a medical document. It is intended as peer to peer communication. It is written in medical language and may contain abbreviations or verbiage that are unfamiliar. It may appear blunt or direct. Medical documents are intended to carry relevant information, facts as evident, and the clinical opinion of the provider.

## 2025-03-20 ENCOUNTER — TELEPHONE (OUTPATIENT)
Dept: NEUROLOGY | Facility: CLINIC | Age: 60
End: 2025-03-20
Payer: COMMERCIAL

## 2025-03-20 NOTE — TELEPHONE ENCOUNTER
----- Message from Carolin Swartz sent at 3/19/2025  1:59 PM EDT -----  Request labs from pcp in [id number] thanks

## 2025-03-21 ENCOUNTER — RESULTS FOLLOW-UP (OUTPATIENT)
Dept: NEUROLOGY | Facility: CLINIC | Age: 60
End: 2025-03-21
Payer: COMMERCIAL

## 2025-03-21 NOTE — PROGRESS NOTES
Please notify Holley that I received her labs from PCP, but these are for 2024. I would recommend she get routine labs each year with PCP. Just want to make sure she gets labs for 2025 with PCP and they can forward those to us in Neurology. She needs CBC and CMP at minimum. Thanks, BERE Beckman

## 2025-04-03 DIAGNOSIS — F90.2 ATTENTION DEFICIT HYPERACTIVITY DISORDER (ADHD), COMBINED TYPE, MILD: ICD-10-CM

## 2025-04-03 RX ORDER — METHYLPHENIDATE HYDROCHLORIDE 80 MG/1
80 CAPSULE ORAL NIGHTLY
Qty: 30 CAPSULE | Refills: 0 | Status: SHIPPED | OUTPATIENT
Start: 2025-04-03

## 2025-04-03 NOTE — TELEPHONE ENCOUNTER
Rx Refill Note  Requested Prescriptions     Pending Prescriptions Disp Refills    Methylphenidate HCl ER, PM, (Jornay PM) 80 MG capsule sustained-release 24 hr 30 capsule 0     Sig: Take 80 mg by mouth Every Night.      Last office visit with prescribing clinician: 3/17/2025   Last telemedicine visit with prescribing clinician: Visit date not found   Next office visit with prescribing clinician: 6/17/2025     Ok to fill?        Sallie Sharpe MA  04/03/25, 13:41 EDT

## 2025-04-16 ENCOUNTER — APPOINTMENT (OUTPATIENT)
Facility: HOSPITAL | Age: 60
End: 2025-04-16
Payer: COMMERCIAL

## 2025-04-16 ENCOUNTER — HOSPITAL ENCOUNTER (EMERGENCY)
Facility: HOSPITAL | Age: 60
Discharge: HOME | End: 2025-04-16
Attending: FAMILY MEDICINE | Admitting: FAMILY MEDICINE
Payer: COMMERCIAL

## 2025-04-16 VITALS
HEART RATE: 64 BPM | RESPIRATION RATE: 18 BRPM | HEIGHT: 62 IN | TEMPERATURE: 97.9 F | DIASTOLIC BLOOD PRESSURE: 68 MMHG | SYSTOLIC BLOOD PRESSURE: 118 MMHG | BODY MASS INDEX: 38.64 KG/M2 | OXYGEN SATURATION: 95 % | WEIGHT: 210 LBS

## 2025-04-16 DIAGNOSIS — S63.92XA HAND SPRAIN, LEFT, INITIAL ENCOUNTER: ICD-10-CM

## 2025-04-16 DIAGNOSIS — M79.642 LEFT HAND PAIN: Primary | ICD-10-CM

## 2025-04-16 PROCEDURE — 73130 X-RAY EXAM OF HAND: CPT

## 2025-04-16 PROCEDURE — 99283 EMERGENCY DEPT VISIT LOW MDM: CPT | Performed by: FAMILY MEDICINE

## 2025-04-16 NOTE — DISCHARGE INSTRUCTIONS
Please follow-up with your PCP.  If your hand pain persists your PCP may wish to refer you to orthopedic/hand surgery for additional workup.  Return to the ED for worsening symptoms including swelling, redness, increased pain or decreased range of motion.

## 2025-04-16 NOTE — FSED PROVIDER NOTE
Subjective   History of Present Illness  Patient is a 60 year old female who presents to the ED for a left hand injury that occurred on Monday evening. The injury occurred as a result of a mechanical fall that involved tripping over her puppy. She has had significant swelling and pain in her hand since this event. She is unable to make a fist secondary to swelling. She denies any additional injuries except for some minor bumps and bruises. She has no additional complaints.       Review of Systems   Constitutional: Negative.    HENT: Negative.     Cardiovascular: Negative.    Musculoskeletal:         Hand pain   Neurological: Negative.    Hematological: Negative.        Past Medical History:   Diagnosis Date    ADHD (attention deficit hyperactivity disorder) March 2023    Allergic     Anemia     Anxiety     CTS (carpal tunnel syndrome) March 1994    Deep vein thrombosis March 30,2022    Depression     Diabetes     Headache, migraine     Hypertension     Obstructive sleep apnea syndrome 08/07/2022    Other headache syndrome 02/16/2023    Peripheral neuropathy     Shingles     Sleep apnea     Syncope and collapse 03/30/2022       Allergies   Allergen Reactions    Ibuprofen Swelling and Rash    Penicillins Itching       Past Surgical History:   Procedure Laterality Date    ADENOIDECTOMY      CHOLECYSTECTOMY      COLONOSCOPY  2016    DIAGNOSTIC LAPAROSCOPY      ENDOMETRIAL ABLATION      TONSILLECTOMY         Family History   Problem Relation Age of Onset    Hypertension Mother     Cancer Mother     Seizures Mother     Arthritis Mother     Drug abuse Father     Hypertension Father     Cancer Father     Alcohol abuse Father     Depression Father     Arthritis Father     Hyperlipidemia Father     Stroke Paternal Grandfather     Arthritis Paternal Grandmother        Social History     Socioeconomic History    Marital status:    Tobacco Use    Smoking status: Never    Smokeless tobacco: Never   Vaping Use    Vaping  "status: Never Used   Substance and Sexual Activity    Alcohol use: Yes     Alcohol/week: 2.0 standard drinks of alcohol     Types: 2 Drinks containing 0.5 oz of alcohol per week     Comment: occ 2 per week    Drug use: Never    Sexual activity: Yes     Partners: Male     Birth control/protection: Post-menopausal           Objective   Physical Exam  Vitals and nursing note reviewed.   Constitutional:       General: She is not in acute distress.     Appearance: Normal appearance. She is normal weight. She is not ill-appearing, toxic-appearing or diaphoretic.   HENT:      Head: Normocephalic and atraumatic.   Neck:      Comments: Bilateral cervical muscle tension, no pinpoint or bony tenderness, Full ROM  Cardiovascular:      Rate and Rhythm: Normal rate and regular rhythm.      Pulses: Normal pulses.      Heart sounds: Normal heart sounds.   Pulmonary:      Effort: Pulmonary effort is normal.      Breath sounds: Normal breath sounds.   Abdominal:      General: Bowel sounds are normal.   Musculoskeletal:      Comments: Left hand neurovascuarly in tact. Patient able to make \"OK\" sign and touch thumb to 5th digit without pain. Dorsum of hand is edematous and diffusely tender to palpation. No open wounds.    Neurological:      Mental Status: She is alert.         Procedures           ED Course  ED Course as of 04/16/25 0907   Wed Apr 16, 2025   0907 XR Hand 3+ View Left [EG]      ED Course User Index  [EG] Tonya Cullen, DO                                           Medical Decision Making  Patient is a 60 year old female who presents to the ED for a left hand injury that occurred on Monday evening. The injury occurred as a result of a mechanical fall that involved tripping over her puppy. She has had significant swelling and pain in her hand since this event. She is unable to make a fist secondary to swelling. She denies any additional injuries except for some minor bumps and bruises. She has no additional complaints. "     Patients Xray was negative for fracture. She declined need for pain medication. Patient was counseled on the importance of following up with PCP and the potential need for orthopedic referral if symptoms do not improve. She was discharged home in stable condition.     Problems Addressed:  Hand sprain, left, initial encounter: complicated acute illness or injury  Left hand pain: complicated acute illness or injury    Amount and/or Complexity of Data Reviewed  Radiology: ordered.        Final diagnoses:   Left hand pain   Hand sprain, left, initial encounter       ED Disposition  ED Disposition       ED Disposition   Discharge    Condition   Stable    Comment   --               Bela Estrella, DO  [address]  [address]  [place] [address]  [phone]    Schedule an appointment as soon as possible for a visit in 2 days      [facility] [place]  [address]  [facility] [address] [phone]  Go to   If symptoms worsen         Medication List      No changes were made to your prescriptions during this visit.

## 2025-05-05 DIAGNOSIS — F90.2 ATTENTION DEFICIT HYPERACTIVITY DISORDER (ADHD), COMBINED TYPE, MILD: ICD-10-CM

## 2025-05-05 RX ORDER — METHYLPHENIDATE HYDROCHLORIDE 80 MG/1
80 CAPSULE ORAL NIGHTLY
Qty: 30 CAPSULE | Refills: 0 | Status: SHIPPED | OUTPATIENT
Start: 2025-05-05

## 2025-05-05 NOTE — TELEPHONE ENCOUNTER
Rx Refill Note  Requested Prescriptions     Pending Prescriptions Disp Refills    Methylphenidate HCl ER, PM, (Jornay PM) 80 MG capsule sustained-release 24 hr 30 capsule 0     Sig: Take 80 mg by mouth Every Night.      Last office visit with prescribing clinician: 3/17/2025   Last telemedicine visit with prescribing clinician: Visit date not found   Next office visit with prescribing clinician: 6/17/2025       Gisela Ma MA  05/05/25, 11:12 EDT

## 2025-06-05 DIAGNOSIS — F90.2 ATTENTION DEFICIT HYPERACTIVITY DISORDER (ADHD), COMBINED TYPE, MILD: ICD-10-CM

## 2025-06-05 RX ORDER — METHYLPHENIDATE HYDROCHLORIDE 80 MG/1
80 CAPSULE ORAL NIGHTLY
Qty: 30 CAPSULE | Refills: 0 | Status: SHIPPED | OUTPATIENT
Start: 2025-06-05

## 2025-06-05 NOTE — TELEPHONE ENCOUNTER
Rx Refill Note  Requested Prescriptions     Pending Prescriptions Disp Refills    Methylphenidate HCl ER, PM, (Jornay PM) 80 MG capsule sustained-release 24 hr 30 capsule 0     Sig: Take 80 mg by mouth Every Night.      Last office visit with prescribing clinician: 3/17/2025   Last telemedicine visit with prescribing clinician: Visit date not found   Next office visit with prescribing clinician: 6/17/2025                         Would you like a call back once the refill request has been completed: [] Yes [] No    If the office needs to give you a call back, can they leave a voicemail: [] Yes [] No    Gisela Ma MA  06/05/25, 09:45 EDT

## 2025-06-17 ENCOUNTER — OFFICE VISIT (OUTPATIENT)
Dept: BEHAVIORAL HEALTH | Facility: CLINIC | Age: 60
End: 2025-06-17
Payer: COMMERCIAL

## 2025-06-17 VITALS
BODY MASS INDEX: 38.64 KG/M2 | DIASTOLIC BLOOD PRESSURE: 74 MMHG | HEART RATE: 57 BPM | SYSTOLIC BLOOD PRESSURE: 122 MMHG | OXYGEN SATURATION: 97 % | WEIGHT: 210 LBS | HEIGHT: 62 IN

## 2025-06-17 DIAGNOSIS — F41.1 GENERALIZED ANXIETY DISORDER: ICD-10-CM

## 2025-06-17 DIAGNOSIS — F90.2 ATTENTION DEFICIT HYPERACTIVITY DISORDER (ADHD), COMBINED TYPE, MILD: Primary | ICD-10-CM

## 2025-06-17 DIAGNOSIS — Z51.81 ENCOUNTER FOR THERAPEUTIC DRUG MONITORING: ICD-10-CM

## 2025-06-17 DIAGNOSIS — F33.1 MAJOR DEPRESSIVE DISORDER, RECURRENT EPISODE, MODERATE: ICD-10-CM

## 2025-06-17 DIAGNOSIS — F51.04 PSYCHOPHYSIOLOGICAL INSOMNIA: ICD-10-CM

## 2025-06-17 RX ORDER — BUPROPION HYDROCHLORIDE 300 MG/1
300 TABLET ORAL EVERY MORNING
Qty: 90 TABLET | Refills: 0 | Status: SHIPPED | OUTPATIENT
Start: 2025-06-17

## 2025-06-17 RX ORDER — ESCITALOPRAM OXALATE 20 MG/1
20 TABLET ORAL DAILY
Qty: 90 TABLET | Refills: 0 | Status: SHIPPED | OUTPATIENT
Start: 2025-06-17

## 2025-06-17 RX ORDER — ESTRADIOL 0.1 MG/G
CREAM VAGINAL
COMMUNITY
Start: 2025-05-16

## 2025-06-17 RX ORDER — BUSPIRONE HYDROCHLORIDE 15 MG/1
15 TABLET ORAL 2 TIMES DAILY
Qty: 180 TABLET | Refills: 0 | Status: SHIPPED | OUTPATIENT
Start: 2025-06-17

## 2025-06-17 RX ORDER — ARIPIPRAZOLE 2 MG/1
2 TABLET ORAL NIGHTLY
Qty: 90 TABLET | Refills: 0 | Status: SHIPPED | OUTPATIENT
Start: 2025-06-17

## 2025-06-17 NOTE — PROGRESS NOTES
Follow Up Office Visit    Patient Name: Naima Paul  : 1965   MRN: [id number]   Care Team: Patient Care Team:  Bela Estrella DO as PCP - General (Family Medicine)  Carol Rao APRN as Nurse Practitioner (Behavioral Health)         Chief Complaint:    Chief Complaint   Patient presents with    ADHD    Depression    Anxiety    Sleeping Problem    Med Management       History of Present Illness: Naima Paul is a 60 y.o. female who is here today for a medication management follow up.  Patient reports that overall medication continues to be effective.  She feels that her focus and concentration are doing good and her mood and anxiety have been managed well. She did not see the need to make any changes and did not have any medication concerns at today's visit. She denies SI/HI today.     The following portion of the patient's history were reviewed and updated appropriately: allergies, current and past medications, family history, medical history and social history. MIRYAM reviewed and appropriate.   Subjective   Review of Systems:    Review of Systems   Respiratory: Negative.     Cardiovascular: Negative.  Negative for chest pain and palpitations.   Neurological:  Negative for dizziness and confusion.   Psychiatric/Behavioral: Negative.     All other systems reviewed and are negative.      Current Medications:   Current Outpatient Medications   Medication Sig Dispense Refill    amphetamine-dextroamphetamine (Adderall) 20 MG tablet Take 1 tablet by mouth Daily. 30 tablet 0    apixaban (Eliquis) 5 MG tablet tablet Take 1 tablet by mouth 2 (Two) Times a Day. 180 tablet 3    ARIPiprazole (Abilify) 2 MG tablet Take 1 tablet by mouth Every Night. 90 tablet 0    buPROPion XL (WELLBUTRIN XL) 300 MG 24 hr tablet Take 1 tablet by mouth Every Morning. 90 tablet 0    busPIRone (BUSPAR) 15 MG tablet Take 1 tablet by mouth 2 (Two) Times a Day. 180 tablet 0    escitalopram (LEXAPRO) 20 MG tablet Take 1 tablet by  "mouth Daily. 90 tablet 0    estradiol (ESTRACE) 0.1 MG/GM vaginal cream INSERT 1 APPLICATORFUL VAGINALLY ONCE A WEEK      loratadine (CLARITIN) 10 MG tablet Take 1 tablet by mouth Daily.      Methylphenidate HCl ER, PM, (Jornay PM) 80 MG capsule sustained-release 24 hr Take 80 mg by mouth Every Night. 30 capsule 0    metoprolol succinate XL (TOPROL-XL) 50 MG 24 hr tablet metoprolol succinate ER 50 mg tablet,extended release 24 hr   TAKE 1 TABLET BY MOUTH ONCE DAILY \"PT MUST BE SEEN FOR ADDITIONAL REFILS\"      montelukast (SINGULAIR) 10 MG tablet Take 1 tablet by mouth Every Night.       No current facility-administered medications for this visit.       Mental Status Exam:   Hygiene:   good  Cooperation:  Cooperative  Eye Contact:  Good  Psychomotor Behavior:  Appropriate  Affect:  Appropriate  Mood: normal  Speech:  Normal  Thought Process:  Goal directed and Linear  Thought Content:  Normal and Mood congruent  Suicidal:  None  Homicidal:  None  Hallucinations:  None  Delusion:  None  Memory:  Intact  Orientation:  Person, Place, Time, and Situation  Reliability:  good  Insight:  Good  Judgement:  Good  Impulse Control:  Good  Physical/Medical Issues:  Yes see chart      Objective   Vital Signs:   /74   Pulse 57   Ht 157.5 cm (62\")   Wt 95.3 kg (210 lb)   SpO2 97%   BMI 38.41 kg/m²         Lab Results:   Lab Results   Component Value Date    WBC 7.35 04/01/2022    HGB 11.9 (L) 04/01/2022    HCT 39.2 04/01/2022    MCV 91.4 04/01/2022     04/01/2022        Lab Results   Component Value Date    GLUCOSE 128 (H) 04/01/2022    BUN 7 04/01/2022    CREATININE 0.54 (L) 04/01/2022     04/01/2022    K 4.1 04/01/2022     04/01/2022    CALCIUM 8.9 04/01/2022    PROTEINTOT 7.3 03/30/2022    ALBUMIN 4.30 03/30/2022    ALT 16 03/30/2022    AST 18 03/30/2022    ALKPHOS 99 03/30/2022    BILITOT 0.2 03/30/2022    GLOB 3.0 03/30/2022    AGRATIO 1.4 03/30/2022    BCR 13.0 04/01/2022    ANIONGAP 10.0 " 04/01/2022    EGFR 107.5 04/01/2022        Lab Results   Component Value Date    CHOL 151 03/31/2022    TRIG 111 03/31/2022    HDL 32 (L) 03/31/2022    LDL 99 03/31/2022        Lab Results   Component Value Date    HGBA1C 5.90 (H) 03/31/2022        Lab Results   Component Value Date    TSH 0.879 03/31/2022            Assessment / Plan    Diagnoses and all orders for this visit:    1. Attention deficit hyperactivity disorder (ADHD), combined type, mild (Primary)    2. Encounter for therapeutic drug monitoring  -     Cancel: Compliance Drug Analysis, Ur - Urine, Clean Catch  -     Drug Screen, With Medication Report, Whole Blood    3. Major depressive disorder, recurrent episode, moderate  -     ARIPiprazole (Abilify) 2 MG tablet; Take 1 tablet by mouth Every Night.  Dispense: 90 tablet; Refill: 0  -     buPROPion XL (WELLBUTRIN XL) 300 MG 24 hr tablet; Take 1 tablet by mouth Every Morning.  Dispense: 90 tablet; Refill: 0  -     escitalopram (LEXAPRO) 20 MG tablet; Take 1 tablet by mouth Daily.  Dispense: 90 tablet; Refill: 0    4. Generalized anxiety disorder  -     ARIPiprazole (Abilify) 2 MG tablet; Take 1 tablet by mouth Every Night.  Dispense: 90 tablet; Refill: 0  -     buPROPion XL (WELLBUTRIN XL) 300 MG 24 hr tablet; Take 1 tablet by mouth Every Morning.  Dispense: 90 tablet; Refill: 0  -     busPIRone (BUSPAR) 15 MG tablet; Take 1 tablet by mouth 2 (Two) Times a Day.  Dispense: 180 tablet; Refill: 0  -     escitalopram (LEXAPRO) 20 MG tablet; Take 1 tablet by mouth Daily.  Dispense: 90 tablet; Refill: 0    5. Psychophysiological insomnia     Patient appears to be doing well on current medication. No issues reported and no indication for change. Will continue medication as ordered.     History and physical exam exhibit continued safe and appropriate use of controlled substance.    Obtained yearly urine drug screen and controlled substance agreement signed.     As part of patient's treatment plan I am  prescribing a controlled substance.  The patient has been made aware of the appropriate use of such medications and potential for dependence and/or overdose.  It has also been made clear that these medications are for use by this patient only, without concomitant use of alcohol or other substances, unless prescribed.    Patient/guardian has completed a prescribing agreement detailing terms of continued prescribing of controlled substances, including monitoring MIRYAM reports, urine drug screening, and pill counts if necessary.  Patient is aware that inappropriate use will result in cessation of prescribing such medications.    PHQ-9 Depression Screening  Little interest or pleasure in doing things? Several days   Feeling down, depressed, or hopeless? Several days   Trouble falling or staying asleep, or sleeping too much? Not at all   Feeling tired or having little energy? Over half   Poor appetite or overeating? Several days   Feeling bad about yourself - or that you are a failure or have let yourself or your family down? Several days   Trouble concentrating on things, such as reading the newspaper or watching television? Several days   Moving or speaking so slowly that other people could have noticed? Or the opposite - being so fidgety or restless that you have been moving around a lot more than usual? Not at all   Thoughts that you would be better off dead, or of hurting yourself in some way? Not at all   PHQ-9 Total Score 7   If you checked off any problems, how difficult have these problems made it for you to do your work, take care of things at home, or get along with other people? Not difficult at all     PHQ-9 Score:   PHQ-9 Total Score: 7    Depression Screening:  Patient screened positive for depression based on a PHQ-9 score of 7 on 6/17/2025. Follow-up recommendations include: Prescribed antidepressant medication treatment, Suicide Risk Assessment performed, and Continue with medication  management.        SAMIA-7  Over the last two weeks, how often have you been bothered by the following problems?  Feeling nervous, anxious or on edge: Not at all  Not being able to stop or control worrying: Several days  Worrying too much about different things: Several days  Trouble Relaxing: Not at all  Being so restless that it is hard to sit still: Not at all  Becoming easily annoyed or irritable: Not at all  Feeling afraid as if something awful might happen: Not at all  SAMIA 7 Total Score: 2  If you checked any problems, how difficult have these problems made it for you to do your work, take care of things at home, or get along with other people: Not difficult at all      ADHD  Screening for Adults With ADHD - (1-6)  1. How often do you have trouble wrapping up the final details of a project, once the challenging parts have been done?: Often  2. How often do you have difficulty getting things in order when you have to do a task that requires organization?: Sometimes  3. How often do you have problems remembering appointments or obligations : Sometimes  4. When you have a task that requires a lot of thought, how often do you avoid or delay getting started ?: Often  5. How often do you fidget or squirm with your hands or feet when you have to sit down for a long time?: Sometimes  6. How often do you feel overly active and compelled to do things, like you were driven by a motor?: Never  7. How often do you make careless mistakes when you have to work on a boring or difficult project?: Rarely  8. How often do have difficulty keeping your attention when you are doing boring or repetitive work?: Rarely  9. How often do you have difficulty concentrating on what people say to you, even when they are speaking to you: Often  10.How often do you misplace or have difficulty finding things at home or at work?: Never  11.How often are you distracted by activity or noise around you?: Sometimes  12.How often do you leave your  seat in meetings or other situations in which you are expected to remain seated?: Never  13.How often do you feel restless or fidgety?: Rarely  14.How often do you have difficulty unwinding and relaxing when you have time to yourself?: Never  15.How often do you find yourself talking too much when you are in social situations?: Never  16.When you’re in a conversation, how often do you find yourself finishing the sentences of the people you are talking to, before they can finish them themselves?: Never  17.How often do you have difficulty waiting your turn in situations when turn taking is required?: Rarely  18.How often do you interrupt others when they are busy?: Sometimes    A psychological evaluation was conducted in order to assess past and current level of functioning. Areas assessed included, but were not limited to: perception of social support, perception of ability to face and deal with challenges in life (positive functioning), anxiety symptoms, depressive symptoms, perspective on beliefs/belief system, coping skills for stress, intelligence level,  Therapeutic rapport was established. Interventions conducted today were geared towards incorporating medication management along with support for continued therapy. Education was also provided as to the med management with this provider and what to expect in subsequent sessions.      We discussed risks, benefits, goals and side effects of the above medication and the patient was agreeable with the plan. Patient was educated on the importance of compliance with treatment and follow-up appointments. Patient is aware to contact the [facility] with any worsening of symptoms. To call for questions or concerns and return early if necessary. Patent is agreeable to go to the Emergency Department or call 911 should they begin SI/HI.    MEDS ORDERED DURING VISIT:  New Medications Ordered This Visit   Medications    ARIPiprazole (Abilify) 2 MG tablet     Sig: Take 1  tablet by mouth Every Night.     Dispense:  90 tablet     Refill:  0    buPROPion XL (WELLBUTRIN XL) 300 MG 24 hr tablet     Sig: Take 1 tablet by mouth Every Morning.     Dispense:  90 tablet     Refill:  0    busPIRone (BUSPAR) 15 MG tablet     Sig: Take 1 tablet by mouth 2 (Two) Times a Day.     Dispense:  180 tablet     Refill:  0    escitalopram (LEXAPRO) 20 MG tablet     Sig: Take 1 tablet by mouth Daily.     Dispense:  90 tablet     Refill:  0         Follow Up   Return in about 3 months (around 9/17/2025).  Patient was given instructions and counseling regarding her condition or for health maintenance advice. Please see specific information pulled into the AVS if appropriate.     TREATMENT PLAN/GOALS: Continue supportive psychotherapy efforts and medications as indicated. Treatment and medication options discussed during today's visit. Patient acknowledged and verbally consented to continue with current treatment plan and was educated on the importance of compliance with treatment and follow-up appointments.    MEDICATION ISSUES:  Discussed medication options and treatment plan of prescribed medication as well as the risks, benefits, and side effects including potential falls, possible impaired driving and metabolic adversities among others. Patient is agreeable to call the office with any worsening of symptoms or onset of side effects. Patient is agreeable to call 911 or go to the nearest ER should he/she begin having SI/HI.        [facility]  [address] [place] [address] [address]IP ZHONG [id number]  [phone]    June 17, 2025 10:41 EDT

## 2025-06-28 LAB
AMPHETAMINES SPEC-MCNC: NEGATIVE NG/ML
APAP SPEC QL: NEGATIVE UG/ML
BARBITURATES SERPLBLD QL: NEGATIVE UG/ML
BENZODIAZ BLD QL: NEGATIVE NG/ML
BUPRENORPHINE BLD QL SCN: NEGATIVE NG/ML
CANNABINOIDS BLD QL SCN: NEGATIVE NG/ML
CARISOPRODOL+MEPROB BLD QL SCN: NEGATIVE UG/ML
COCAINE+BZE SERPLBLD QL SCN: NEGATIVE NG/ML
DRUGS BLD SCN NOM: NORMAL
DRUGS BLD SCN NOM: NORMAL
DRUGS BLD SCN NOM: POSITIVE
ETHANOL BLD-MCNC: NEGATIVE GM/DL
FENTANYL BLD QL SCN: NEGATIVE NG/ML
FENTANYL SERPLBLD CFM-MCNC: NEGATIVE NG/ML
FENTANYL SPEC QL: NEGATIVE
GABAPENTIN SERPLBLD QL SCN: NEGATIVE UG/ML
HX OF MEDICATION USE: NORMAL
MEPERIDINE SERPLBLD QL SCN: NEGATIVE NG/ML
METHADONE BLD QL SCN: NEGATIVE NG/ML
NORFENTANYL SERPLBLD CFM-MCNC: NEGATIVE NG/ML
OPIATES BLD QL SCN: NEGATIVE NG/ML
OXYCODONE BLD QL SCN: NEGATIVE NG/ML
PCP BLD QL SCN: NEGATIVE NG/ML
PROPOXYPH BLD QL SCN: NEGATIVE NG/ML
SERVICE CMNT-IMP: NORMAL
TRAMADOL BLD QL SCN: NEGATIVE NG/ML

## 2025-07-10 DIAGNOSIS — F90.2 ATTENTION DEFICIT HYPERACTIVITY DISORDER (ADHD), COMBINED TYPE, MILD: ICD-10-CM

## 2025-07-10 NOTE — TELEPHONE ENCOUNTER
Rx Refill Note  Requested Prescriptions     Pending Prescriptions Disp Refills    Methylphenidate HCl ER, PM, (Jornay PM) 80 MG capsule sustained-release 24 hr 30 capsule 0     Sig: Take 80 mg by mouth Every Night.      Last office visit with prescribing clinician: 6/17/2025   Last telemedicine visit with prescribing clinician: Visit date not found   Next office visit with prescribing clinician: 9/17/2025                         Would you like a call back once the refill request has been completed: [] Yes [] No    If the office needs to give you a call back, can they leave a voicemail: [] Yes [] No    Gisela Ma MA  07/10/25, 10:59 EDT

## 2025-07-11 RX ORDER — METHYLPHENIDATE HYDROCHLORIDE 80 MG/1
80 CAPSULE ORAL NIGHTLY
Qty: 30 CAPSULE | Refills: 0 | Status: SHIPPED | OUTPATIENT
Start: 2025-07-11

## 2025-08-13 DIAGNOSIS — F90.2 ATTENTION DEFICIT HYPERACTIVITY DISORDER (ADHD), COMBINED TYPE, MILD: ICD-10-CM

## 2025-08-13 RX ORDER — METHYLPHENIDATE HYDROCHLORIDE 80 MG/1
80 CAPSULE ORAL NIGHTLY
Qty: 30 CAPSULE | Refills: 0 | Status: SHIPPED | OUTPATIENT
Start: 2025-08-13